# Patient Record
Sex: MALE | Race: WHITE | NOT HISPANIC OR LATINO | Employment: FULL TIME | URBAN - METROPOLITAN AREA
[De-identification: names, ages, dates, MRNs, and addresses within clinical notes are randomized per-mention and may not be internally consistent; named-entity substitution may affect disease eponyms.]

---

## 2021-02-27 ENCOUNTER — VIRTUAL VISIT (OUTPATIENT)
Dept: URGENT CARE | Facility: CLINIC | Age: 31
End: 2021-02-27
Payer: COMMERCIAL

## 2021-02-27 DIAGNOSIS — H10.32 ACUTE BACTERIAL CONJUNCTIVITIS OF LEFT EYE: Primary | ICD-10-CM

## 2021-02-27 PROCEDURE — 99203 OFFICE O/P NEW LOW 30 MIN: CPT | Mod: 95 | Performed by: NURSE PRACTITIONER

## 2021-02-27 RX ORDER — POLYMYXIN B SULFATE AND TRIMETHOPRIM 1; 10000 MG/ML; [USP'U]/ML
1-2 SOLUTION OPHTHALMIC EVERY 4 HOURS
Qty: 10 ML | Refills: 0 | Status: SHIPPED | OUTPATIENT
Start: 2021-02-27 | End: 2021-03-06

## 2021-02-27 NOTE — PROGRESS NOTES
"  The parent/guardian has been notified of following:     \"This telephone visit will be conducted via a call between you, your child and your child's physician/provider. We have found that certain health care needs can be provided without the need for a physical exam.  This service lets us provide the care you need with a short phone conversation.  If a prescription is necessary we can send it directly to your pharmacy.  If lab work is needed we can place an order for that and you can then stop by our lab to have the test done at a later time.    Telephone visits are billed at different rates depending on your insurance coverage. During this emergency period, for some insurers they may be billed the same as an in-person visit.  Please reach out to your insurance provider with any questions.    If during the course of the call the physician/provider feels a telephone visit is not appropriate, you will not be charged for this service.\"    Parent/guardian has given verbal consent for Telephone visit?  Yes    What phone number would you like to be contacted at? home    How would you like to obtain your AVS? [employer]      SUBJECTIVE:  Chief Complaint:   Chief Complaint   Patient presents with     Conjunctivitis     History of Present Illness:  Kamron Headley is a 30 year old male who presents complaining of moderate left eye discharge, mattering starting today.   Onset/timing: sudden.    Associated Signs and Symptoms: has acne and he feels this may have caused a \"bacterial\" infection in the eye  Treatment measures tried include: none  Contact wearer : No    Past Medical History:   Diagnosis Date     NO ACTIVE PROBLEMS      Current Outpatient Medications   Medication Sig Dispense Refill     acyclovir (ZOVIRAX) 800 MG tablet Take 1 tablet (800 mg) by mouth 5 times daily 35 tablet 0     bismuth subsalicylate (PEPTO BISMOL) 262 MG/15ML suspension Take 15 mLs by mouth every 6 hours as needed for indigestion       triamcinolone " (KENALOG) 0.1 % paste Apply to affected area twice per day for 2 weeks. 5 g 0     valACYclovir (VALTREX) 1000 mg tablet Take 1 tablet (1,000 mg) by mouth 3 times daily 21 tablet 0        ROS:  Review of systems negative except as stated above.    OBJECTIVE:  There were no vitals taken for this visit.  General: no acute distress  Reports yellow discharge    ASSESSMENT:  Bacterial Conjunctivitis    PLAN:  Warm packs for comfort. Hygiene measures discussed. Polytrim ophthalmic drops-1-2 drops in the affected eye(s) every 4 hours while awake for 3 days. Follow-up if no improvement noted.     Minutes 11  See orders in epic

## 2021-02-27 NOTE — PATIENT INSTRUCTIONS
Patient Education     What Is Conjunctivitis?    Conjunctivitis is an irritation or infection. It affects the membrane that covers the white of your eye and the inside of your eyelid (conjunctiva). It can happen to one or both eyes. The membrane swells and the blood vessels enlarge (dilate). This makes your eye red. That's why conjunctivitis is sometimes called red eye or pink eye.   What are the symptoms?  If you have one or more of these symptoms, see an eye healthcare provider:    Redness in and around your eye    Eyes that are puffy and sore    Itching, burning, or stinging eyes    Watery eyes or discharge from your eye    Eyelids that are crusty or stuck together when you wake up in the morning    Pink color in the whites of one or both eyes    Sensitivity to bright light  Getting treatment quickly can help prevent damage to your eyes.  How is it diagnosed?  Conjunctivitis is often a minor eye infection. But it can sometimes become a more serious problem. Some more serious eye diseases have symptoms that look like conjunctivitis. So it's important for an eye healthcare provider to diagnose you. Your eye healthcare provider will ask about your symptoms and any medicines you take. He or she will ask about any illnesses or health conditions you may have. The healthcare provider will also check your eyes with a hand-held light and a special microscope called a slit lamp.   [employer] last reviewed this educational content on 8/1/2020 2000-2020 [employer]. [address] [place] [address], [place] [address]. All rights reserved. This information is not intended as a substitute for professional medical care. Always follow your healthcare professional's instructions.

## 2021-04-18 ENCOUNTER — HEALTH MAINTENANCE LETTER (OUTPATIENT)
Age: 31
End: 2021-04-18

## 2021-07-06 ENCOUNTER — NURSE TRIAGE (OUTPATIENT)
Dept: NURSING | Facility: CLINIC | Age: 31
End: 2021-07-06

## 2021-07-06 NOTE — TELEPHONE ENCOUNTER
Pt reports intermittent, low-grade fever, and phantom smells.  He had epistaxis of right nare, 2 days ago.  He has a constant headache, which at worst, he rates at 9/10.  He has pain above his right eye, under his right eye, and at the teeth/upper right jaw and ear.  He is also having night sweats.  Pt has tried Sudafed without relief.  Ibuprofen helps for a short while.    Per protocol, pt advised to be seen today.  Pt transferred to scheduling to set up thisd appointment.  Oma Mcknight RN 07/06/21 10:13 AM  [facility] Nurse Advisor    Additional Information    Negative: Sounds like a life-threatening emergency to the triager    Negative: Difficulty breathing, and not from stuffy nose (e.g., not relieved by cleaning out the nose)    Negative: SEVERE headache and has fever    Negative: Patient sounds very sick or weak to the triager    SEVERE sinus pain    Protocols used: SINUS PAIN AND CONGESTION-A-OH    COVID 19 Nurse Triage Plan/Patient Instructions    Please be aware that novel coronavirus (COVID-19) may be circulating in the community. If you develop symptoms such as fever, cough, or SOB or if you have concerns about the presence of another infection including coronavirus (COVID-19), please contact your health care provider or visit [email or web address].     Disposition/Instructions    In-Person Visit with provider recommended. Reference Visit Selection Guide.    Thank you for taking steps to prevent the spread of this virus.  o Limit your contact with others.  o Wear a simple mask to cover your cough.  o Wash your hands well and often.    Resources    [facility]: About COVID-19: [email or web address]    CDC: What to Do If You're Sick: www.cdc.gov/coronavirus/2019-ncov/about/steps-when-sick.html    CDC: Ending Home Isolation: www.cdc.gov/coronavirus/2019-ncov/hcp/disposition-in-home-patients.html     CDC: Caring for Someone:  www.cdc.gov/coronavirus/2019-ncov/if-you-are-sick/care-for-someone.html     [facility]: Interim Guidance for Hospital Discharge to Home: www.health.[facility].[place]/diseases/coronavirus/hcp/hospdischarge.pdf    [facility] clinical trials (COVID-19 research studies): [email or web address]-clinical-trials     Below are the COVID-19 hotlines at the [place] Department of Health ([facility]). Interpreters are available.   o For health questions: Call 055-975-1777 or 1-831.648.9743 (7 a.m. to 7 p.m.)  o For questions about schools and childcare: Call 025-228-8229 or 1-552.523.1996 (7 a.m. to 7 p.m.)

## 2021-07-07 ENCOUNTER — TELEPHONE (OUTPATIENT)
Dept: FAMILY MEDICINE | Facility: CLINIC | Age: 31
End: 2021-07-07

## 2021-07-07 ENCOUNTER — OFFICE VISIT (OUTPATIENT)
Dept: FAMILY MEDICINE | Facility: CLINIC | Age: 31
End: 2021-07-07
Payer: COMMERCIAL

## 2021-07-07 VITALS
WEIGHT: 175 LBS | HEART RATE: 92 BPM | DIASTOLIC BLOOD PRESSURE: 78 MMHG | TEMPERATURE: 100.4 F | RESPIRATION RATE: 16 BRPM | SYSTOLIC BLOOD PRESSURE: 138 MMHG | OXYGEN SATURATION: 98 % | BODY MASS INDEX: 24.41 KG/M2

## 2021-07-07 DIAGNOSIS — S02.5XXS OPEN FRACTURE OF TOOTH, SEQUELA: ICD-10-CM

## 2021-07-07 DIAGNOSIS — J01.00 ACUTE NON-RECURRENT MAXILLARY SINUSITIS: Primary | ICD-10-CM

## 2021-07-07 PROCEDURE — 99214 OFFICE O/P EST MOD 30 MIN: CPT | Performed by: FAMILY MEDICINE

## 2021-07-07 RX ORDER — AMOXICILLIN AND CLAVULANATE POTASSIUM 562.5; 437.5; 62.5 MG/1; MG/1; MG/1
2 TABLET, MULTILAYER, EXTENDED RELEASE ORAL 2 TIMES DAILY
Qty: 28 TABLET | Refills: 0 | Status: SHIPPED | OUTPATIENT
Start: 2021-07-07 | End: 2021-07-14

## 2021-07-07 NOTE — TELEPHONE ENCOUNTER
Writer called patient and reviewed amoxicillin-clavulanate (AUGMENTIN) 875-125 MG tablet ordered by Dr. Becerra and to which pharmacy medication sent.    Patient verbalized understanding and in agreement with plan.  LEEANN Velásquez, RN  [facility]

## 2021-07-07 NOTE — PATIENT INSTRUCTIONS
Patient Education     Sinusitis (Antibiotic Treatment)    The sinuses are air-filled spaces within the bones of the face. They connect to the inside of the nose. Sinusitis is an inflammation of the tissue that lines the sinuses. Sinusitis can occur during a cold. It can also happen due to allergies to pollens and other particles in the air. Sinusitis can cause symptoms of sinus congestion and a feeling of fullness. A sinus infection causes fever, headache, and facial pain. There is often green or yellow fluid draining from the nose or into the back of the throat (post-nasal drip). You have been given antibiotics to treat this condition.   Home care    Take the full course of antibiotics as instructed. Don't stop taking them, even when you feel better.    Drink plenty of water, hot tea, and other liquids as directed by the healthcare provider. This may help thin nasal mucus. It also may help your sinuses drain fluids.    Heat may help soothe painful areas of your face. Use a towel soaked in hot water. Or,  the shower and direct the warm spray onto your face. Using a vaporizer along with a menthol rub at night may also help soothe symptoms.     An expectorant with guaifenesin may help thin nasal mucus and help your sinuses drain fluids. Talk with your provider or pharmacists before taking an over-the-counter (OTC) medicine if you have any questions about it or its side effects..    You can use an OTC decongestant, unless a similar medicine was prescribed to you. Nasal sprays work the fastest. Use one that contains phenylephrine or oxymetazoline. First blow your nose gently. Then use the spray. Don't use these medicines more often than directed on the label. If you do, your symptoms may get worse. You may also take pills that contain pseudoephedrine. Don t use products that combine multiple medicines. This is because side effects may be increased. Read labels. You can also ask the pharmacist for help. (People  with high blood pressure should not use decongestants. They can raise blood pressure.) Talk with your provider or pharmacist if you have any questions about the medicine..    OTC antihistamines may help if allergies contributed to your sinusitis. Talk with your provider or pharmacist if you have any questions about the medicine..    Don't use nasal rinses or irrigation during an acute sinus infection, unless your healthcare provider tells you to. Rinsing may spread the infection to other areas in your sinuses.    Use acetaminophen or ibuprofen to control pain, unless another pain medicine was prescribed to you. If you have chronic liver or kidney disease or ever had a stomach ulcer, talk with your healthcare provider before using these medicines. Never give aspirin to anyone under age 18 who is ill with a fever. It may cause severe liver damage.    Don't smoke. This can make symptoms worse.    Follow-up care  Follow up with your healthcare provider, or as advised.   When to seek medical advice  Call your healthcare provider if any of these occur:     Facial pain or headache that gets worse    Stiff neck    Unusual drowsiness or confusion    Swelling of your forehead or eyelids    Symptoms don't go away in 10 days    Vision problems, such as blurred or double vision    Fever of 100.4 F (38 C) or higher, or as directed by your healthcare provider  Call 911  Call 911 if any of these occur:     Seizure    Trouble breathing    Feeling dizzy or faint    Fingernails, skin or lips look blue, purple , or gray  Prevention  Here are steps you can take to help prevent an infection:     Keep good hand washing habits.    Don t have close contact with people who have sore throats, colds, or other upper respiratory infections.    Don t smoke, and stay away from secondhand smoke.    Stay up to date with of your vaccines.  [employer] last reviewed this educational content on 12/1/2019 2000-2021 [employer]. All rights  reserved. This information is not intended as a substitute for professional medical care. Always follow your healthcare professional's instructions.         PLEASE MAKE A FOLLOW-UP APPOINTMENT TO SEE A DENTIST FOR YOUR TOOTH.

## 2021-07-07 NOTE — PROGRESS NOTES
Assessment & Plan     Acute non-recurrent maxillary sinusitis  -Given subjective fever and chills will treat for acute bacterial sinusitis  -High dose amoxicillin/augmentin not covered. Sent in for augmenting BID x 7 days  -If worsening symptoms, facial swelling/erthema advised to go to ED immediately for evaluation    Fractured tooth  -Noted concern for potential dental abscess if continues to go untreated  -Discussed seeing dentist for this issue  -Does not appear to be infected, gums are not inflamed      30 minutes spent on the date of the encounter doing chart review, history and exam, documentation and further activities per the note       DO RENEA Jacobson [facility]    Nabila Lundy is a 31 year old who presents for the following health issues:    HPI     Patient here for right sided maxillary sinus pain and nasal congestion. Symptoms started about 5-6 days ago after he had some bleeding from his right nostril which has since resolved. Now having headaches and pain over his right cheek. Yellow-green nasal discharge. States subjective fevers and chills at home. Notes pain radiates to is upper right jaw and ear. States history of chipped upper right molar, has not gone in to see a dentist. Tried sudafed without relief. Using ibuprofen with some pain relief. Denies cough, wheezing, nausea, vomiting, changes in vision, blurry vision.     Review of Systems   INTEGUMENTARY/SKIN: NEGATIVE for worrisome rashes, moles or lesions  EYES: NEGATIVE for vision changes or irritation  RESP: NEGATIVE for significant cough or SOB  CV: NEGATIVE for chest pain, palpitations or peripheral edema  GI: NEGATIVE for nausea, abdominal pain, heartburn, or change in bowel habits  NEURO: NEGATIVE for weakness, dizziness or paresthesias      Objective    /78 (BP Location: Right arm, Patient Position: Sitting, Cuff Size: Adult Regular)   Pulse 92   Temp 100.4  F (38  C) (Tympanic)   Resp 16    Wt 79.4 kg (175 lb)   SpO2 98%   BMI 24.41 kg/m    Body mass index is 24.41 kg/m .  Physical Exam   GENERAL: alert and no distress  EYES: Eyes grossly normal to inspection, PERRL and conjunctivae and sclerae normal. No periorbital edema.   HENT: tenderness over right maxillary sinus. Ear canals and TM's normal, nose and mouth without ulcers or lesions. Chipped right upper molar. No obvious gum swelling or erythema.   NECK: no adenopathy, no asymmetry, masses, or scars and thyroid normal to palpation  RESP: lungs clear to auscultation - no rales, rhonchi or wheezes  CV: regular rate and rhythm, normal S1 S2, no S3 or S4, no murmur, click or rub, no peripheral edema and peripheral pulses strong  SKIN: no suspicious lesions or rashes

## 2021-07-07 NOTE — TELEPHONE ENCOUNTER
"Pt saw Dr. Becerra today.  His pharmacy will only fill 3 days of the antibiotic.  They said the dose was \"weird\".  High cost, too.    PT says he sees generic augmentin 875-125 mg on his formulary.  Please advise. Send in a new rx?    OK to use the [facility] on  or [facility] pharmacy at the clinic. Whatever is faster.        OK TO LEAVE A DETAILED MESSAGE.  NISSA Cramer    "

## 2021-07-08 ENCOUNTER — TELEPHONE (OUTPATIENT)
Dept: FAMILY MEDICINE | Facility: CLINIC | Age: 31
End: 2021-07-08

## 2021-07-08 NOTE — TELEPHONE ENCOUNTER
This Rx for amoxicillin-clavulanate (AUGMENTIN XR) 1000-62.5 MG 12 hr tablet cannot be ordered.  Cost is $169.01.  Please put in a new Rx for an alternative.  Suggested alternative/s is/are: switch to a cheaper med.

## 2021-07-08 NOTE — TELEPHONE ENCOUNTER
"This was resolved yesterday.     Pt saw Dr. Becerra today.  His pharmacy will only fill 3 days of the antibiotic.  They said the dose was \"weird\".  High cost, too.     PT says he sees generic augmentin 875-125 mg on his formulary.  Please advise. Send in a new rx?     OK to use the [facility] on  or [facility] pharmacy at the clinic. Whatever is faster.        "

## 2021-10-02 ENCOUNTER — HEALTH MAINTENANCE LETTER (OUTPATIENT)
Age: 31
End: 2021-10-02

## 2022-05-14 ENCOUNTER — HEALTH MAINTENANCE LETTER (OUTPATIENT)
Age: 32
End: 2022-05-14

## 2022-09-03 ENCOUNTER — HEALTH MAINTENANCE LETTER (OUTPATIENT)
Age: 32
End: 2022-09-03

## 2023-02-16 ENCOUNTER — OFFICE VISIT (OUTPATIENT)
Dept: FAMILY MEDICINE | Facility: CLINIC | Age: 33
End: 2023-02-16
Payer: COMMERCIAL

## 2023-02-16 VITALS
BODY MASS INDEX: 23.24 KG/M2 | TEMPERATURE: 99.3 F | DIASTOLIC BLOOD PRESSURE: 68 MMHG | SYSTOLIC BLOOD PRESSURE: 112 MMHG | WEIGHT: 166 LBS | HEIGHT: 71 IN | HEART RATE: 86 BPM | RESPIRATION RATE: 16 BRPM | OXYGEN SATURATION: 98 %

## 2023-02-16 DIAGNOSIS — A63.0 ANAL WARTS: ICD-10-CM

## 2023-02-16 DIAGNOSIS — R79.89 ELEVATED LFTS: ICD-10-CM

## 2023-02-16 DIAGNOSIS — F60.9 PERSONALITY DISORDER (H): ICD-10-CM

## 2023-02-16 DIAGNOSIS — K12.0 ORAL APHTHOUS ULCER: ICD-10-CM

## 2023-02-16 DIAGNOSIS — B97.7 HIGH RISK HPV INFECTION: ICD-10-CM

## 2023-02-16 DIAGNOSIS — Z21 HIV-1 (HUMAN IMMUNODEFICIENCY VIRUS I) (H): Primary | ICD-10-CM

## 2023-02-16 PROBLEM — F60.3 BORDERLINE PERSONALITY DISORDER (H): Status: ACTIVE | Noted: 2023-02-16

## 2023-02-16 LAB
ALBUMIN SERPL BCG-MCNC: 4.7 G/DL (ref 3.5–5.2)
ALP SERPL-CCNC: 152 U/L (ref 40–129)
ALT SERPL W P-5'-P-CCNC: 107 U/L (ref 10–50)
ANION GAP SERPL CALCULATED.3IONS-SCNC: 15 MMOL/L (ref 7–15)
AST SERPL W P-5'-P-CCNC: 61 U/L (ref 10–50)
BASOPHILS # BLD AUTO: 0 10E3/UL (ref 0–0.2)
BASOPHILS NFR BLD AUTO: 0 %
BILIRUB SERPL-MCNC: 0.6 MG/DL
BUN SERPL-MCNC: 8.5 MG/DL (ref 6–20)
C TRACH DNA SPEC QL NAA+PROBE: NEGATIVE
CALCIUM SERPL-MCNC: 10.1 MG/DL (ref 8.6–10)
CD3 CELLS # BLD: 365 CELLS/UL (ref 603–2990)
CD3 CELLS NFR BLD: 53 % (ref 49–84)
CD3+CD4+ CELLS # BLD: 112 CELLS/UL (ref 441–2156)
CD3+CD4+ CELLS NFR BLD: 16 % (ref 28–63)
CD3+CD4+ CELLS/CD3+CD8+ CLL BLD: 0.51 % (ref 1.4–2.6)
CD3+CD8+ CELLS # BLD: 221 CELLS/UL (ref 125–1312)
CD3+CD8+ CELLS NFR BLD: 32 % (ref 10–40)
CHLORIDE SERPL-SCNC: 100 MMOL/L (ref 98–107)
CREAT SERPL-MCNC: 0.58 MG/DL (ref 0.67–1.17)
DEPRECATED HCO3 PLAS-SCNC: 24 MMOL/L (ref 22–29)
EOSINOPHIL # BLD AUTO: 0 10E3/UL (ref 0–0.7)
EOSINOPHIL NFR BLD AUTO: 1 %
ERYTHROCYTE [DISTWIDTH] IN BLOOD BY AUTOMATED COUNT: 12.8 % (ref 10–15)
GFR SERPL CREATININE-BSD FRML MDRD: >90 ML/MIN/1.73M2
GLUCOSE SERPL-MCNC: 85 MG/DL (ref 70–99)
HCT VFR BLD AUTO: 42.1 % (ref 40–53)
HGB BLD-MCNC: 14.5 G/DL (ref 13.3–17.7)
IMM GRANULOCYTES # BLD: 0 10E3/UL
IMM GRANULOCYTES NFR BLD: 0 %
LYMPHOCYTES # BLD AUTO: 0.6 10E3/UL (ref 0.8–5.3)
LYMPHOCYTES NFR BLD AUTO: 18 %
MCH RBC QN AUTO: 28.6 PG (ref 26.5–33)
MCHC RBC AUTO-ENTMCNC: 34.4 G/DL (ref 31.5–36.5)
MCV RBC AUTO: 83 FL (ref 78–100)
MONOCYTES # BLD AUTO: 0.2 10E3/UL (ref 0–1.3)
MONOCYTES NFR BLD AUTO: 7 %
N GONORRHOEA DNA SPEC QL NAA+PROBE: NEGATIVE
NEUTROPHILS # BLD AUTO: 2.5 10E3/UL (ref 1.6–8.3)
NEUTROPHILS NFR BLD AUTO: 74 %
PLATELET # BLD AUTO: 202 10E3/UL (ref 150–450)
POTASSIUM SERPL-SCNC: 3.8 MMOL/L (ref 3.4–5.3)
PROT SERPL-MCNC: 8.7 G/DL (ref 6.4–8.3)
RBC # BLD AUTO: 5.07 10E6/UL (ref 4.4–5.9)
SODIUM SERPL-SCNC: 139 MMOL/L (ref 136–145)
T CELL COMMENT: ABNORMAL
T PALLIDUM AB SER QL: NONREACTIVE
WBC # BLD AUTO: 3.3 10E3/UL (ref 4–11)

## 2023-02-16 PROCEDURE — 87591 N.GONORRHOEAE DNA AMP PROB: CPT | Performed by: FAMILY MEDICINE

## 2023-02-16 PROCEDURE — 86360 T CELL ABSOLUTE COUNT/RATIO: CPT | Performed by: FAMILY MEDICINE

## 2023-02-16 PROCEDURE — 87491 CHLMYD TRACH DNA AMP PROBE: CPT | Performed by: FAMILY MEDICINE

## 2023-02-16 PROCEDURE — 86696 HERPES SIMPLEX TYPE 2 TEST: CPT | Performed by: FAMILY MEDICINE

## 2023-02-16 PROCEDURE — 86359 T CELLS TOTAL COUNT: CPT | Performed by: FAMILY MEDICINE

## 2023-02-16 PROCEDURE — 87522 HEPATITIS C REVRS TRNSCRPJ: CPT | Performed by: FAMILY MEDICINE

## 2023-02-16 PROCEDURE — 90651 9VHPV VACCINE 2/3 DOSE IM: CPT | Performed by: FAMILY MEDICINE

## 2023-02-16 PROCEDURE — 36415 COLL VENOUS BLD VENIPUNCTURE: CPT | Performed by: FAMILY MEDICINE

## 2023-02-16 PROCEDURE — 86778 TOXOPLASMA ANTIBODY IGM: CPT | Mod: 90 | Performed by: FAMILY MEDICINE

## 2023-02-16 PROCEDURE — 86780 TREPONEMA PALLIDUM: CPT | Performed by: FAMILY MEDICINE

## 2023-02-16 PROCEDURE — 99214 OFFICE O/P EST MOD 30 MIN: CPT | Mod: 25 | Performed by: FAMILY MEDICINE

## 2023-02-16 PROCEDURE — 86777 TOXOPLASMA ANTIBODY: CPT | Mod: 90 | Performed by: FAMILY MEDICINE

## 2023-02-16 PROCEDURE — 99000 SPECIMEN HANDLING OFFICE-LAB: CPT | Performed by: FAMILY MEDICINE

## 2023-02-16 PROCEDURE — 86701 HIV-1ANTIBODY: CPT | Mod: 59 | Performed by: FAMILY MEDICINE

## 2023-02-16 PROCEDURE — 86702 HIV-2 ANTIBODY: CPT | Mod: 59 | Performed by: FAMILY MEDICINE

## 2023-02-16 PROCEDURE — 85025 COMPLETE CBC W/AUTO DIFF WBC: CPT | Performed by: FAMILY MEDICINE

## 2023-02-16 PROCEDURE — 90471 IMMUNIZATION ADMIN: CPT | Performed by: FAMILY MEDICINE

## 2023-02-16 PROCEDURE — 86695 HERPES SIMPLEX TYPE 1 TEST: CPT | Performed by: FAMILY MEDICINE

## 2023-02-16 PROCEDURE — 86803 HEPATITIS C AB TEST: CPT | Performed by: FAMILY MEDICINE

## 2023-02-16 PROCEDURE — 80053 COMPREHEN METABOLIC PANEL: CPT | Performed by: FAMILY MEDICINE

## 2023-02-16 PROCEDURE — 87536 HIV-1 QUANT&REVRSE TRNSCRPJ: CPT | Performed by: FAMILY MEDICINE

## 2023-02-16 ASSESSMENT — PAIN SCALES - GENERAL: PAINLEVEL: NO PAIN (0)

## 2023-02-16 NOTE — TELEPHONE ENCOUNTER
Diagnosis, Referred by & from: Anal Warts   Appt date: 5/1/2023   NOTES STATUS DETAILS   OFFICE NOTE from referring provider [facility]:  2/16/23 - PCC OV with Dr. Geller   OFFICE NOTE from other specialist Internal [facility]:  3/31/23 - ID OV with Dr. [facility]:  9/3/13 - PCC OV with Tess Ferreira NP    [facility]:  5/8/13 - CR OV with Dr. Lopez   DISCHARGE SUMMARY from hospital N/A    DISCHARGE REPORT from the ER N/A    OPERATIVE REPORT Internal [facility]:  5/30/13 - OP Note for EXCISION AND FULGURATION OF ANAL CONDYLOMA with Dr. Lopez   MEDICATION LIST Internal    LABS     BIOPSIES/PATHOLOGY RELATED TO DIAGNOSIS Internal [facility]:  5/30/13 - Anal Biopsy (Case: [id number])   DIAGNOSTIC PROCEDURES N/A    IMAGING (DISC & REPORT) N/A

## 2023-02-16 NOTE — PROGRESS NOTES
Assessment & Plan     (B20) HIV-1 (human immunodeficiency virus I) (H)  (primary encounter diagnosis)  Comment:   Note that patient reports this is first he knew of positive result and he is understandably quite upset. He would like additional testing before receiving treatment or seeing specialist because he believes if he has been positive for the past almost 7 years, that he'd be dead by now. He is aware that HIV is treatable.  He declined ID referral, worried about cost.    I reviewed chart, patient had been contacted with [facility], [email or web address] message (which he had reviewed), certified letter and telephone in which diagnosis and ID referral was discussed with him between 5/25/2016 and 7/26/2016.  I verified that letters sent at that time were sent to the correct address with him today. He denies any knowledge of telephone calls and denies any communication or letters received.     Will obtain labs as below and will follow up as indicated.    Plan: HIV Antigen Antibody Combo, HIV-1 RNA         quantitative, CBC with platelets and         differential, Comprehensive metabolic panel         (BMP + Alb, Alk Phos, ALT, AST, Total. Bili,         TP), Treponema Abs w Reflex to RPR and Titer,         NEISSERIA GONORRHOEA PCR, CHLAMYDIA TRACHOMATIS        PCR, T cell        subset profile            (A63.0) Anal warts  Comment: Patient feels that last surgery surgeon did not remove all lesions, intentionally left some. Operative note reports removal of all lesions and fulguration of suspect areas.  Significant warty growths noted today, I do recommend surgical removal, referred as below.  I do recommend  HPV vaccine series, he agreed, #1 given today. Follow up 2 months for #2 of 3.  Plan: Adult Colorectal Surgery  Referral          (K12.0) Oral aphthous ulcer  Comment: could be herpes, not likely to be chancroid, stress related, vs infectious, HIV workup pending, see above, test  Plan: over the counter treatments as  "needed for pain relief    Personality disorder strongly suspected; patient not content with explanations, requesting my help, refusing help offered. See below.    When I called patient he asked if he was being charged 100's of dollars to not get resolution for the problem he came in for (the apthous ulcer), \"I'm seriously asking if you're going to not give me something for something that's lasted 4 weeks. What I'm trying to understand is why you're not prescribing something for my lip. You're not willing to do anything to help.\"    He insists that he was never given the diagnosis of HIV in the past, he had no conversations, he never received any mail, and that he hadn't checked his mychart prior to today, not consistent with chart documentation. When  I recommend ID referral he reported being worried about cost. I offered to prescribe initial antiviral medications but that he'd need to be followed by ID, he declined. I reiterated that HIV was treatable and recommended treatment, he told he thinks I probably just want him to die. I assured him this wasn't true.  I did hang up when he started getting louder and verbally abusive.     No follow-ups on file.    Tess Geller MD  [facility]    Nabila Lundy is a 32 year old, presenting for the following health issues:  Office Visit (Patient complain of sore in mouth and is not healing for the past 4 weeks and was dx with HPV about 8 years ago. Thinking that this might be the cause of the sore. Really want referral for this matters. )      History of Present Illness       Reason for visit:  Sexual health    He eats 2-3 servings of fruits and vegetables daily.He consumes 0 sweetened beverage(s) daily.He exercises with enough effort to increase his heart rate 20 to 29 minutes per day.  He exercises with enough effort to increase his heart rate 4 days per week.   He is taking medications regularly.     1. Oral lesion  Patient " "describes large, painful sore of left side inner lip, onset one month ago, not responding to over the counter treatments including oragel. Denies dental caries, oral trauma or any other triggering event.    2. HPV condyloma  He described 2 new lesions on his penis and large anal warts that are bothering him. He feels he might have an internal mass as well, not sure if this is a wart or hemorrhoid. He denies blood in his stool, pain with defecation, or stool changes. He has not had the HPV vaccine (Gardisil).    3. Positive HIV test 5/25/2016  See chart, patient was referred to ID but was having insurance coverage issues, has not seen specialists, and has not had follow up lab tests or treatment. He denies recurrent infections, fatigue, weight loss or other signs of illness except as noted above. See A/P below.    Review of Systems   Constitutional, HEENT, cardiovascular, pulmonary, GI, , musculoskeletal, neuro, skin, endocrine and psych systems are negative, except as otherwise noted.      Objective    /68 (BP Location: Left arm, Patient Position: Sitting, Cuff Size: Adult Regular)   Pulse 86   Temp 99.3  F (37.4  C) (Temporal)   Resp 16   Ht 1.803 m (5' 11\")   Wt 75.3 kg (166 lb)   SpO2 98%   BMI 23.15 kg/m    Body mass index is 23.15 kg/m .  Physical Exam   GENERAL: healthy, alert and no distress  NECK: no adenopathy, no asymmetry, masses, or scars and thyroid normal to palpation  RESP: lungs clear to auscultation - no rales, rhonchi or wheezes  CV: regular rate and rhythm, normal S1 S2, no S3 or S4, no murmur, click or rub, no peripheral edema and peripheral pulses strong  ABDOMEN: soft, nontender, no hepatosplenomegaly, no masses and bowel sounds normal   (male): testicles normal without atrophy or masses, no hernias and penile warts noted, #2 typically verucous small pedunculated massed noted of anterior penis.  Rectum: multiple fleshy warty growths noted of perianal area. I performed rectal " exam with his permission, warty growth noted by palpation of inner anal area, perhaps emiting from anal verge. No rectal bleeding noted. No tenderness noted on palpation.  MS: no gross musculoskeletal defects noted, no edema  NEURO: Normal strength and tone, mentation intact and speech normal  PSYCH: anxious and note discrepancy between chart documentation and patient's history in A/P.    Results for orders placed or performed in visit on 02/16/23 (from the past 24 hour(s))   CBC with platelets and differential    Narrative    The following orders were created for panel order CBC with platelets and differential.  Procedure                               Abnormality         Status                     ---------                               -----------         ------                     CBC with platelets and d...[[id number]]  Abnormal            Final result                 Please view results for these tests on the individual orders.   Comprehensive metabolic panel (BMP + Alb, Alk Phos, ALT, AST, Total. Bili, TP)   Result Value Ref Range    Sodium 139 136 - 145 mmol/L    Potassium 3.8 3.4 - 5.3 mmol/L    Chloride 100 98 - 107 mmol/L    Carbon Dioxide (CO2) 24 22 - 29 mmol/L    Anion Gap 15 7 - 15 mmol/L    Urea Nitrogen 8.5 6.0 - 20.0 mg/dL    Creatinine 0.58 (L) 0.67 - 1.17 mg/dL    Calcium 10.1 (H) 8.6 - 10.0 mg/dL    Glucose 85 70 - 99 mg/dL    Alkaline Phosphatase 152 (H) 40 - 129 U/L    AST 61 (H) 10 - 50 U/L     (H) 10 - 50 U/L    Protein Total 8.7 (H) 6.4 - 8.3 g/dL    Albumin 4.7 3.5 - 5.2 g/dL    Bilirubin Total 0.6 <=1.2 mg/dL    GFR Estimate >90 >60 mL/min/1.73m2   Treponema Abs w Reflex to RPR and Titer   Result Value Ref Range    Treponema Antibody Total Nonreactive Nonreactive   CBC with platelets and differential   Result Value Ref Range    WBC Count 3.3 (L) 4.0 - 11.0 10e3/uL    RBC Count 5.07 4.40 - 5.90 10e6/uL    Hemoglobin 14.5 13.3 - 17.7 g/dL    Hematocrit 42.1 40.0 - 53.0 %    MCV 83 78 -  100 fL    MCH 28.6 26.5 - 33.0 pg    MCHC 34.4 31.5 - 36.5 g/dL    RDW 12.8 10.0 - 15.0 %    Platelet Count 202 150 - 450 10e3/uL    % Neutrophils 74 %    % Lymphocytes 18 %    % Monocytes 7 %    % Eosinophils 1 %    % Basophils 0 %    % Immature Granulocytes 0 %    Absolute Neutrophils 2.5 1.6 - 8.3 10e3/uL    Absolute Lymphocytes 0.6 (L) 0.8 - 5.3 10e3/uL    Absolute Monocytes 0.2 0.0 - 1.3 10e3/uL    Absolute Eosinophils 0.0 0.0 - 0.7 10e3/uL    Absolute Basophils 0.0 0.0 - 0.2 10e3/uL    Absolute Immature Granulocytes 0.0 <=0.4 10e3/uL   T cell subset profile   Result Value Ref Range    CD3% Total T Cells 53 49 - 84 %    Absolute CD3, Total T Cells 365 (L) 603 - 2,990 cells/uL    CD4% [place] T Cells 16 (L) 28 - 63 %    Absolute CD4, [place] T Cells 112 (L) 441 - 2,156 cells/uL    CD8% Suppressor T Cells 32 10 - 40 %    Absolute CD8, Suppressor T Cells 221 125 - 1,312 cells/uL    CD4:CD8 Ratio 0.51 (L) 1.40 - 2.60    T Cell Comment

## 2023-02-16 NOTE — PATIENT INSTRUCTIONS
Please check that you've had tdap with in the past 10 years, and meningitis vaccine as well (usually age 16 or so)

## 2023-02-17 ENCOUNTER — TELEPHONE (OUTPATIENT)
Dept: FAMILY MEDICINE | Facility: CLINIC | Age: 33
End: 2023-02-17
Payer: COMMERCIAL

## 2023-02-17 DIAGNOSIS — K12.0 ORAL APHTHOUS ULCER: ICD-10-CM

## 2023-02-17 DIAGNOSIS — B18.2 CHRONIC HEPATITIS C WITHOUT HEPATIC COMA (H): ICD-10-CM

## 2023-02-17 DIAGNOSIS — Z21 HIV-1 (HUMAN IMMUNODEFICIENCY VIRUS I) (H): Primary | ICD-10-CM

## 2023-02-17 DIAGNOSIS — B20 AIDS (ACQUIRED IMMUNODEFICIENCY SYNDROME), CD4 <=200 (H): ICD-10-CM

## 2023-02-17 LAB
HIV 1+2 AB+HIV1 P24 AG SERPL QL IA: REACTIVE
HIV 1+2 AB+HIV1P24 AG SERPLBLD IA.RAPID: ABNORMAL
HIV 2 AB SERPLBLD QL IA.RAPID: NONREACTIVE
HIV1 AB SERPLBLD QL IA.RAPID: REACTIVE
HIV1 RNA # PLAS NAA DL=20: ABNORMAL COPIES/ML
HIV1 RNA SERPL NAA+PROBE-LOG#: 4.4 {LOG_COPIES}/ML
HSV1 IGG SERPL QL IA: 0.1 INDEX
HSV1 IGG SERPL QL IA: NORMAL
HSV2 IGG SERPL QL IA: 0.12 INDEX
HSV2 IGG SERPL QL IA: NORMAL

## 2023-02-17 NOTE — RESULT ENCOUNTER NOTE
Thank you for getting labs done.   You do not have chlamydia, gonorrhea or syphilis, which is good news    The testing confirms that you have HIV type 1, and because your CD-4 count (a measure of a type of white blood cell) is less than 200, you do have active AIDS. I do recommend that you start antiviral treatment as soon as you can to help prevent infectious complications.  I have put in a urgent referral to our infectious disease specialist, who will contact you to help schedule an appointment soon. Treatment reduces both your symptoms of HIV and reduces your risk of transmitting the infection to others.    You also have Hepatitis C, another blood borne infection. This is curable with specialized treatment.  I have referred you to a gastroenterology specialist who can recommend treatments for you. This is also contagious through contact with infected blood.    Both HIV and Hepatitis C are reportable infections so the [facility] will probably be contacting you separately from our clinic because it's important to trace any contacts to offer them testing and treatment as well.     Please know you do have treatment options.    Why Should You Take Your HIV Medicine as Prescribed?  If taken as prescribed, HIV medicine can reduce the amount of HIV in your blood (also called your viral load) to a very low level. This is called viral suppression.    If your viral load is so low that a standard lab can't detect it, this is called having an undetectable viral load. People with HIV who take HIV medicine as prescribed and get and keep an undetectable viral load can live long and healthy lives and will not transmit HIV to their HIV-negative partners through sex.    HIV treatment involves taking highly effective medicines called antiretroviral therapy (ART) that work to control the virus. ART is recommended for everyone with HIV, and people with HIV should start ART as soon as possible after  diagnosis.    People on ART take a combination of HIV medicines called an HIV treatment regimen. There are two types of HIV treatment: pills and shots. Pills are recommended for people who are just starting HIV treatment. There are many FDA-approved single pill and combination medicines available.    People who have had an undetectable viral load (or have been virally suppressed) for at least three months may consider shots.  Why Is HIV Treatment Important?  If taken as prescribed, HIV medicine reduces the amount of HIV in your blood (also called your viral load) to a very low level, which keeps your immune system working and prevents illness. This is called viral suppression, defined as having less than 200 copies of HIV per milliliter of blood.    HIV medicine can also make your viral load so low that a standard lab test can't detect it. This is called having an undetectable level viral load. Almost everyone who takes HIV medicine as prescribed can achieve an undetectable viral load, usually within 6 months after starting treatment. Many will bring their viral load to an undetectable level quickly, but it could take more time for a small portion of people just starting HIV medicine.    There are important health benefits to getting the viral load as low as possible. People with HIV who know their status, take HIV medicine as prescribed, and get and keep an undetectable viral load can live long and healthy lives.    There is also a major prevention benefit. People with HIV who take HIV medicine as prescribed and get and keep an undetectable viral load will not transmit HIV to their HIV-negative partners through sex.    Please contact the office if you have any questions about any of this.    Sincerely,  Dr. Tess Geller MD  2/17/2023

## 2023-02-18 LAB
T GONDII IGG SER-ACNC: <3 IU/ML
T GONDII IGM SER-ACNC: <3 AU/ML

## 2023-02-18 NOTE — TELEPHONE ENCOUNTER
Who is Calling: patient    Update: Patient calling and would like provider to call him regarding appointment yesterday. Patient did not discuss nature of the call or what the questions were regarding. Please advise, patient requesting call back on 2/18, I did state that it is the weekend and the provider may not look at her notes.     Does caller want a call/response back: Yes     Could we send this information to you in [employer] or would you prefer to receive a phone call?:   Patient would prefer a phone call   Okay to leave a detailed message?: Yes at Cell number on file:    Telephone Information:   Mobile [phone]

## 2023-02-20 ENCOUNTER — TELEPHONE (OUTPATIENT)
Dept: INFECTIOUS DISEASES | Facility: CLINIC | Age: 33
End: 2023-02-20
Payer: COMMERCIAL

## 2023-02-20 LAB
HCV AB SERPL QL IA: NONREACTIVE
HCV RNA SERPL NAA+PROBE-ACNC: NOT DETECTED IU/ML
HSV1 IGG SERPL QL IA: 0.12 INDEX
HSV1 IGG SERPL QL IA: NORMAL
HSV2 IGG SERPL QL IA: 0.32 INDEX
HSV2 IGG SERPL QL IA: NORMAL

## 2023-02-20 NOTE — TELEPHONE ENCOUNTER
"Spoke with patient regarding referral that was placed. Patient appears very anxious. Discussed with patient options for appointments. Patient requests an appointment this Friday, scheduled for 8am with Dr. Turner- unable to come in before then to meet with a nurse. Patient reports that he was \"blindsided\" by his PCP calling to tell him his HIV result which led to him becoming very angry. He states it was documented that he has a personality disorder but he does not believe he does and would like to ensure that does not impact his care. Of note, patient had initial positive result in 2016 and its documented that he was aware of it then, however he reports this most recent lab confirmation was surprising and the first he was made aware. Will send mychart to patient with my direct number if he has any questions.  "

## 2023-02-20 NOTE — TELEPHONE ENCOUNTER
DIAGNOSIS: Chronic hepatitis C without hepatic coma    Appt Date: 03.29.2023   NOTES STATUS DETAILS   OFFICE NOTE from referring provider Internal 02.17.2023 Tess Geller MD   OFFICE NOTES from other specialists     DISCHARGE SUMMARY from hospital     MEDICATION LIST     LIVER BIOSPY (IF APPLICABLE)      PATHOLOGY REPORTS      IMAGING     ENDOSCOPY (IF AVAILABLE)     COLONOSCOPY (IF AVAILABLE)     ULTRASOUND LIVER     CT OF ABDOMEN     MRI OF LIVER     FIBROSCAN, US ELASTOGRAPHY, FIBROSIS SCAN, MR ELASTOGRAPHY     LABS     HEPATIC PANEL (LIVER PANEL)     BASIC METABOLIC PANEL     COMPLETE METABOLIC PANEL Internal 02.16.2023   COMPLETE BLOOD COUNT (CBC) Internal 02.16.2023   INTERNATIONAL NORMALIZED RATIO (INR)     HEPATITIS C ANTIBODY Internal 02.16.2023   HEPATITIS C VIRAL LOAD/PCR     HEPATITIS C GENOTYPE     HEPATITIS B SURFACE ANTIGEN     HEPATITIS B SURFACE ANTIBODY     HEPATITIS B DNA QUANT LEVEL     HEPATITIS B CORE ANTIBODY

## 2023-02-21 NOTE — TELEPHONE ENCOUNTER
RECORDS RECEIVED FROM: Internal   DATE RECEIVED: 2.24.23   NOTES (Gather within 2 years) STATUS DETAILS   OFFICE NOTE from referring provider   Internal 2.16.23  Duyen OTTO   OFFICE NOTE from other specialist     DISCHARGE SUMMARY from hospital     DISCHARGE REPORT from the ER     LABS (any labs) Internal    MEDICATION LIST Internal    IMAGING  (NEED IMAGES AND REPORTS)     Osteomyelitis: Foot imaging      Liver Abscess: Abdominal imagineg     Other (anything related to diagnoses

## 2023-02-21 NOTE — RESULT ENCOUNTER NOTE
HI, the rest of your labs are back.    You do not have herpes 1 or 2, toxoplasmosis, or hepatitis C.  This is a different result form your labs several years ago, so I think most likely the prior Hepatitis C was a false positive, because the confirmatory test done this time was negative, as was the quantitative level.    Please schedule an appointment if you'd like to discuss these results.    Sincerely,  Dr. Tess Geller MD  2/21/2023

## 2023-02-21 NOTE — TELEPHONE ENCOUNTER
Hi, please offer patient an appointment if he would like to discuss medical issues. I'm happy to review labs and answer questions but it does have to be a medical appointment. Virtual is fine.  Please note that he has history of sometimes getting angry with prior contact, see chart.    Thank you!    Sincerely,  Dr. Tess Geller MD  2/21/2023

## 2023-02-22 NOTE — PROGRESS NOTES
[facility]  General Infectious Disease/HIV Clinic Note: New Patient     Patient:  Kamron Headley, Date of birth 1990  Medical record number [id number]  Date of Visit:  02/24/2023     Assessment and Plan   1. HIV/AIDS, CD4 112/16%  2. L lip ulcer present for 5 weeks  3. Mild transaminitis  4. H/o HPV+ condyloma acuminatum (resected 2013)  5. Hepatitis B nonimmune (per 2016 labs)    Mr. Headley presents to ID clinic to establish care for a new HIV/AIDS diagnosis. We discussed at length that by his current CD4 <200, he is considered to have AIDS which essentially means he is immunocompromised. We also discussed starting therapy today (biktarvy) which he is amenable to. Given his CD4 is <200, we will also start bactrim for PJP ppx. We discussed at length the resources available to our HIV+ patients, and provided reassurance that we will be able to provide ART regardless of insurance status, and have multiple resources available to him.     Regarding current medical concerns, I am concerned about the lesion he has on his lip. It has been present for 5 weeks, and does not appear consistent with HSV (noted negative HSV Abs as well). Given his low CD4 the differential is broad, but I am most concerned about malignancy vs atypical infection. I placed an ENT referral for biopsy and cultures. He is also concerned about the recurrence of his anal warts. He has previously had treatment and is known to be HPV+. On exam today, the warts are large, but do not appear to be invasive. He is able to defecate without issue, and only occasionally has bleeding. He already has follow up scheduled with Colorectal Surgery, so we will await their opinion. If his symptoms should worsen in the next few months, however, I will request a more urgent evaluation.     Mr. Headley is due for multiple vaccines, but I would like to hold off on these today as his immune response to vaccination is likely to be less robust  than usual given low CD4. By the same logic, I would like to wait for a quant gold until he has a higher CD4 as the results right now could be inaccurate.     Plan:  1. New pt HIV labs to be drawn today, including resistance testing   2. GC/chlamydia oral and rectal swabs obtained today  3. Start biktarvy today  4. Start bactrim ppx today  5. ENT referral for biopsy of lip lesion   - In addition to pathology, please send for bacterial, fungal, and AFB cultures and smears.   6. Pt already has follow up with colorectal surgery re: rectal HPV+ condyloma acuminatum.   7. ID pharmacy consult for medication management  8. Follow up & labs (HIV VL, CD4) with me in 4 weeks  9. When CD4 > 200, Hep B series, shingrix, PCV20, Tdap, meningococcal vaccine, quant gold.     RTC: 1 month with me, 2 weeks with ID pharmacy        Madie Turner MD    Pager [phone]  Infectious Diseases         History of the Infectious Disease lllness:   Kamron Headley is a pleasant 32 year old gentleman with no significant past medical history who presents to ID clinic to discuss a new diagnosis of HIV/AIDS.     Mr. Headley denies prior knowledge or memory of this, but he was first diagnosed with HIV in 5/2016 when he presented to his PCP with oral ulcers. He was tested for multiple other STIs at the time that were negative. He did not follow up with ID at the time and has not been on medications previously. He had a shingles outbreak around that time. He has otherwise been well until recently when he saw his PCP for an oral ulcer. He was tested for multiple STIs, including HIV, and was positive. CD4 is 112 (16%). He has been referred to us for additional management. He notes he has never been on PrEP before. He has not recently been sexually active, and has no current concerns for an STI. He did not undergo rectal or oral screening for GC/chlamydia and is interested in obtaining that today. He is also very eager to start HIV medications, and  understands the need for lifelong adherence.     Mr. Headley has some mild sinus congestion without pressure or pain. He thinks he may have a deviated septum though he has not been evaluated for this. He has an interior lip lesion that has been present for around 5 weeks. The area was more painful when it first developed, but the pain has subsided somewhat. The size has remained stable since he first noted it. He also notes some swelling and face asymmetry associated with the lip lesion. He also notes that his anal warts have grown quite large. He is having some pain with defecation and occasionally notes blood when he wipes. He has also developed 2 small lesions on his penile shaft. He is feeling very anxious but coming to terms with his new HIV diagnosis. He otherwise feels well and denies headaches, vision changes, chest pain, shortness of breath, abdominal pain, constipation or diarrhea, joint pains, swelling, or rashes.     HIV:  -diagnosis: 2016  -previous ART regimens: none  -resistance: unknown, panel pending    Health care maintenance:   A. Vaccination/Serology status.   -Hepatitis A: Immune  -Hepatitis B: Nonimmune, nonexposed  -Strep pneumo: due (will wait until CD4 >200)  -TDaP: last 2012, due  -Zoster: due (will wait until CD4 >200)  -Meningococcal: due (previously had menomune)  -Influenza   -COVID: up to date  -Mpox: up to date    B. Routine Infectious Disease screening.   -Hepatitis C: negative (2023)   -Toxo IgG: negative (2023)  -CMV IGG: negative (2023)   -TB screen: due  -STD sceen: GC/CT: pending; RPR negative (2023);     C. Cardiovascular, renal and bone health.   -Cholesterol: getting today  -Blood pressure: 120/83  -Smoking status:   -Alcohol:  -Illicit drug use:  -Weight:   -Dexa:    D. Cancer screening (if applicable)  -Pap smear: +HPV condyloma, awaiting colorectal surgery evaluation      Review of Systems:  CONSTITUTIONAL:  No fevers or chills. No night sweats.  EYES: negative for icterus  or acute vision changes.   ENT:  negative for hearing loss, tinnitus or sore throat  RESPIRATORY:  negative for cough, sputum, dyspnea  CARDIOVASCULAR:  negative for chest pain, heart palpitations  GASTROINTESTINAL:  negative for nausea, vomiting, diarrhea or constipation  GENITOURINARY:  negative for dysuria or hematuria.  HEME:  No easy bruising or bleeding  INTEGUMENT:  negative for rash or pruritus  NEURO:  Negative for headache or tremor.    Past Surgical History:   Procedure Laterality Date     EXAM UNDER ANESTHESIA, FULGURATE CONDYLOMA ANUS, COMBINED  5/30/2013    Procedure: COMBINED EXAM UNDER ANESTHESIA, FULGURATE CONDYLOMA ANUS;  Exam Under Anesthesia of Anus, Excision and Fulguration of Anal Condyloma;  Surgeon: Matias Lopez MD;  Location: [facility] OR     NO HISTORY OF SURGERY         Family History   Problem Relation Age of Onset     Cancer Paternal Grandmother      Family History Negative No family hx of         no skin disorders       Social History     Social History Narrative     Not on file     Social History     Tobacco Use     Smoking status: Never     Smokeless tobacco: Never   Vaping Use     Vaping Use: Never used   Substance Use Topics     Alcohol use: No     Drug use: No       Immunization History   Administered Date(s) Administered     COVID-19 Vaccine 18+ (Moderna) 04/15/2021, 05/13/2021, 12/01/2021     COVID-19 Vaccine Bivalent Booster 18+ (Moderna) 12/17/2022     DTAP (<7y) 05/03/1995     HEPA 08/22/2008, 10/29/2012     HPV9 02/16/2023     HepB 09/10/1999, 11/01/1999, 05/01/2000     Hib (PRP-T) 03/22/1991, 05/17/1991, 09/16/1991     Influenza Vaccine >6 months (Alfuria,Fluzone) 12/17/2022     MMR 09/10/1999     Meningococcal (Menomune ) 08/22/2008     OPV, trivalent, live 05/03/1995     Smallpox/Mpox Vaccine Subcutaneous (JYNNEOS) 08/19/2022, 09/28/2022     TD (ADULT, 7+) 06/17/2002     TDAP Vaccine (Adacel) 10/29/2012       Patient Active Problem List   Diagnosis     CARDIOVASCULAR  SCREENING; LDL GOAL LESS THAN 160     Tinea corporis     Skin rash     Elevated LFTs     Anal skin tag     High risk HPV infection     Anal warts     HIV-1 (human immunodeficiency virus I) (H)     Oral aphthous ulcer     Borderline personality disorder (H)     Personality disorder (H)     Chronic hepatitis C without hepatic coma (H)     AIDS (acquired immunodeficiency syndrome), CD4 <=200 (H)     No Known Allergies         Physical Exam:   Vitals were reviewed.  All vitals stable  /83 (BP Location: Right arm, Patient Position: Sitting, Cuff Size: Adult Regular)   Pulse 89   Temp 98.4  F (36.9  C) (Oral)   Wt 77.6 kg (171 lb)   SpO2 94%   BMI 23.85 kg/m    Wt Readings from Last 4 Encounters:   02/16/23 75.3 kg (166 lb)   07/07/21 79.4 kg (175 lb)   07/26/16 64.9 kg (143 lb)   05/24/16 67.7 kg (149 lb 4 oz)     Exam:  GENERAL: well-developed, well-nourished, alert, oriented, in no acute distress.  HEAD: Head is normocephalic, atraumatic   EYES: Eyes have anicteric sclerae.    ENT: Oropharynx is moist without exudates or ulcers. L inner lip with ~1.5 cm superficial erosion, brown film covering, mild edema of lip surrounding, mildly TTP.   NECK: Supple. No cervical LAD.   LUNGS: Clear to auscultation b/l. Normal WOB on RA.   Back: No TTP throughout spine.   CARDIOVASCULAR: Regular rate and rhythm with no murmurs, gallops or rubs.  ABDOMEN: Normal bowel sounds, soft, nontender.  SKIN: No acute rashes.   : ~3cm rectal condyloma acuminatum, no hemorrhage or areas of ulceration. 2 small condyloma on R side of penile shaft.   NEUROLOGIC: Grossly nonfocal.         Laboratory Data:     Metabolic Studies    Recent Labs   Lab Test 02/16/23  1119      POTASSIUM 3.8   CHLORIDE 100   CO2 24   ANIONGAP 15   BUN 8.5   CR 0.58*   GFRESTIMATED >90   GLC 85   DENISHA 10.1*       Hepatic Studies    Recent Labs   Lab Test 02/16/23  1119   BILITOTAL 0.6   ALKPHOS 152*   PROTTOTAL 8.7*   ALBUMIN 4.7   AST 61*   *        Hematology Studies     Recent Labs   Lab Test 02/16/23  1119   WBC 3.3*   HGB 14.5   HCT 42.1        Microbiology:  Last Culture results with specimen source  No results found for: CULT Specimen Description   Date Value Ref Range Status   05/24/2016 Throat  Final   05/24/2016 Urine  Final   08/20/2014 Urine  Final   09/03/2013 Urine  Final   02/19/2013 Knee  Final        Virology:  Hepatitis B Testing     Recent Labs   Lab Test 05/24/16  0913   AUSAB 2.26   HEPBANG Nonreactive      Hepatitis C Antibody   Date Value Ref Range Status   02/16/2023 Nonreactive Nonreactive Final   05/24/2016 (A) NR Final    Reactive   A reactive result indicates one of the following 1) current HCV infection 2)   past HCV infection that has resolved or 3) false positivity. The CDC recommends   that a reactive result should be followed by Nucleic acid testing for HCV RNA.  If HCV RNA is detected, that indicates current HCV infection. If HCV RNA is not   detected, that indicates either past, resolved HCV infection, or false HCV   antibody positivity.   Assay performance characteristics have not been established for newborns,   infants, and children     08/20/2014 Negative NEG Final     Herpes Simplex Virus Type 1 IgG Antibody   Date Value Ref Range Status   02/16/2023 No HSV-1 IgG antibodies detected. No HSV-1 IgG antibodies detected Final   02/16/2023 No HSV-1 IgG antibodies detected. No HSV-1 IgG antibodies detected Final     Herpes Simplex Virus Type 2 IgG Antibody   Date Value Ref Range Status   02/16/2023 No HSV-2 IgG antibodies detected. No HSV-2 IgG antibodies detected Final   02/16/2023 No HSV-2 IgG antibodies detected. No HSV-2 IgG antibodies detected Final     Imaging:   No results found for this or any previous visit.

## 2023-02-24 ENCOUNTER — TELEPHONE (OUTPATIENT)
Dept: INFECTIOUS DISEASES | Facility: CLINIC | Age: 33
End: 2023-02-24

## 2023-02-24 ENCOUNTER — PRE VISIT (OUTPATIENT)
Dept: INFECTIOUS DISEASES | Facility: CLINIC | Age: 33
End: 2023-02-24
Payer: COMMERCIAL

## 2023-02-24 ENCOUNTER — LAB (OUTPATIENT)
Dept: LAB | Facility: CLINIC | Age: 33
End: 2023-02-24
Payer: COMMERCIAL

## 2023-02-24 ENCOUNTER — OFFICE VISIT (OUTPATIENT)
Dept: INFECTIOUS DISEASES | Facility: CLINIC | Age: 33
End: 2023-02-24
Attending: INTERNAL MEDICINE
Payer: COMMERCIAL

## 2023-02-24 VITALS
SYSTOLIC BLOOD PRESSURE: 120 MMHG | WEIGHT: 171 LBS | BODY MASS INDEX: 23.85 KG/M2 | HEART RATE: 89 BPM | DIASTOLIC BLOOD PRESSURE: 83 MMHG | TEMPERATURE: 98.4 F | OXYGEN SATURATION: 94 %

## 2023-02-24 DIAGNOSIS — B20 AIDS (ACQUIRED IMMUNODEFICIENCY SYNDROME), CD4 <=200 (H): ICD-10-CM

## 2023-02-24 DIAGNOSIS — Z21 HIV-1 (HUMAN IMMUNODEFICIENCY VIRUS I) (H): ICD-10-CM

## 2023-02-24 DIAGNOSIS — B20 AIDS (ACQUIRED IMMUNODEFICIENCY SYNDROME), CD4 <=200 (H): Primary | ICD-10-CM

## 2023-02-24 LAB
ALBUMIN SERPL BCG-MCNC: 4.8 G/DL (ref 3.5–5.2)
ALBUMIN UR-MCNC: NEGATIVE MG/DL
ALP SERPL-CCNC: 136 U/L (ref 40–129)
ALT SERPL W P-5'-P-CCNC: 78 U/L (ref 10–50)
AMYLASE SERPL-CCNC: 71 U/L (ref 28–100)
ANION GAP SERPL CALCULATED.3IONS-SCNC: 11 MMOL/L (ref 7–15)
APPEARANCE UR: CLEAR
AST SERPL W P-5'-P-CCNC: 48 U/L (ref 10–50)
BASOPHILS # BLD AUTO: 0 10E3/UL (ref 0–0.2)
BASOPHILS NFR BLD AUTO: 1 %
BILIRUB SERPL-MCNC: 0.5 MG/DL
BILIRUB UR QL STRIP: NEGATIVE
BUN SERPL-MCNC: 8.8 MG/DL (ref 6–20)
C TRACH DNA SPEC QL NAA+PROBE: NEGATIVE
C TRACH DNA SPEC QL NAA+PROBE: NEGATIVE
CALCIUM SERPL-MCNC: 10 MG/DL (ref 8.6–10)
CHLORIDE SERPL-SCNC: 100 MMOL/L (ref 98–107)
CHOLEST SERPL-MCNC: 160 MG/DL
CMV IGG SERPL IA-ACNC: <0.2 U/ML
CMV IGG SERPL IA-ACNC: NORMAL
COLOR UR AUTO: ABNORMAL
CREAT SERPL-MCNC: 0.57 MG/DL (ref 0.67–1.17)
DEPRECATED HCO3 PLAS-SCNC: 28 MMOL/L (ref 22–29)
EOSINOPHIL # BLD AUTO: 0 10E3/UL (ref 0–0.7)
EOSINOPHIL NFR BLD AUTO: 1 %
ERYTHROCYTE [DISTWIDTH] IN BLOOD BY AUTOMATED COUNT: 12.7 % (ref 10–15)
GFR SERPL CREATININE-BSD FRML MDRD: >90 ML/MIN/1.73M2
GLUCOSE SERPL-MCNC: 96 MG/DL (ref 70–99)
GLUCOSE UR STRIP-MCNC: NEGATIVE MG/DL
HAV IGG SER QL IA: REACTIVE
HBV CORE AB SERPL QL IA: NONREACTIVE
HBV SURFACE AB SERPL IA-ACNC: 1.55 M[IU]/ML
HBV SURFACE AB SERPL IA-ACNC: NONREACTIVE M[IU]/ML
HBV SURFACE AG SERPL QL IA: NONREACTIVE
HCT VFR BLD AUTO: 44.4 % (ref 40–53)
HDLC SERPL-MCNC: 40 MG/DL
HGB BLD-MCNC: 15.4 G/DL (ref 13.3–17.7)
HGB UR QL STRIP: NEGATIVE
IMM GRANULOCYTES # BLD: 0 10E3/UL
IMM GRANULOCYTES NFR BLD: 0 %
KETONES UR STRIP-MCNC: NEGATIVE MG/DL
LDLC SERPL CALC-MCNC: 103 MG/DL
LEUKOCYTE ESTERASE UR QL STRIP: NEGATIVE
LIPASE SERPL-CCNC: 33 U/L (ref 13–60)
LYMPHOCYTES # BLD AUTO: 0.6 10E3/UL (ref 0.8–5.3)
LYMPHOCYTES NFR BLD AUTO: 16 %
Lab: NORMAL
MCH RBC QN AUTO: 29.2 PG (ref 26.5–33)
MCHC RBC AUTO-ENTMCNC: 34.7 G/DL (ref 31.5–36.5)
MCV RBC AUTO: 84 FL (ref 78–100)
MONOCYTES # BLD AUTO: 0.3 10E3/UL (ref 0–1.3)
MONOCYTES NFR BLD AUTO: 7 %
MUCOUS THREADS #/AREA URNS LPF: PRESENT /LPF
N GONORRHOEA DNA SPEC QL NAA+PROBE: NEGATIVE
N GONORRHOEA DNA SPEC QL NAA+PROBE: NEGATIVE
NEUTROPHILS # BLD AUTO: 2.6 10E3/UL (ref 1.6–8.3)
NEUTROPHILS NFR BLD AUTO: 75 %
NITRATE UR QL: NEGATIVE
NONHDLC SERPL-MCNC: 120 MG/DL
NRBC # BLD AUTO: 0 10E3/UL
NRBC BLD AUTO-RTO: 0 /100
PERFORMING LABORATORY: NORMAL
PH UR STRIP: 7 [PH] (ref 5–7)
PLATELET # BLD AUTO: 206 10E3/UL (ref 150–450)
POTASSIUM SERPL-SCNC: 4.4 MMOL/L (ref 3.4–5.3)
PROT SERPL-MCNC: 8.7 G/DL (ref 6.4–8.3)
RBC # BLD AUTO: 5.28 10E6/UL (ref 4.4–5.9)
RBC URINE: 1 /HPF
SODIUM SERPL-SCNC: 139 MMOL/L (ref 136–145)
SP GR UR STRIP: 1.01 (ref 1–1.03)
TEST NAME: NORMAL
TRIGL SERPL-MCNC: 85 MG/DL
UROBILINOGEN UR STRIP-MCNC: NORMAL MG/DL
WBC # BLD AUTO: 3.5 10E3/UL (ref 4–11)
WBC URINE: <1 /HPF

## 2023-02-24 PROCEDURE — 87591 N.GONORRHOEAE DNA AMP PROB: CPT | Performed by: INTERNAL MEDICINE

## 2023-02-24 PROCEDURE — 86706 HEP B SURFACE ANTIBODY: CPT | Performed by: PATHOLOGY

## 2023-02-24 PROCEDURE — 87389 HIV-1 AG W/HIV-1&-2 AB AG IA: CPT | Mod: XU | Performed by: PATHOLOGY

## 2023-02-24 PROCEDURE — 87340 HEPATITIS B SURFACE AG IA: CPT | Performed by: PATHOLOGY

## 2023-02-24 PROCEDURE — 87904 PHENOTYPE DNA HIV W/CLT ADD: CPT | Performed by: PATHOLOGY

## 2023-02-24 PROCEDURE — 87901 NFCT AGT GNTYP ALYS HIV1 REV: CPT | Performed by: PATHOLOGY

## 2023-02-24 PROCEDURE — 87491 CHLMYD TRACH DNA AMP PROBE: CPT | Performed by: INTERNAL MEDICINE

## 2023-02-24 PROCEDURE — 80053 COMPREHEN METABOLIC PANEL: CPT | Performed by: PATHOLOGY

## 2023-02-24 PROCEDURE — 82150 ASSAY OF AMYLASE: CPT | Performed by: PATHOLOGY

## 2023-02-24 PROCEDURE — 87906 NFCT AGT GNTYP ALYS HIV1: CPT | Performed by: PATHOLOGY

## 2023-02-24 PROCEDURE — 36415 COLL VENOUS BLD VENIPUNCTURE: CPT | Performed by: PATHOLOGY

## 2023-02-24 PROCEDURE — 81001 URINALYSIS AUTO W/SCOPE: CPT | Performed by: PATHOLOGY

## 2023-02-24 PROCEDURE — 99417 PROLNG OP E/M EACH 15 MIN: CPT | Performed by: INTERNAL MEDICINE

## 2023-02-24 PROCEDURE — 80061 LIPID PANEL: CPT | Performed by: PATHOLOGY

## 2023-02-24 PROCEDURE — 83690 ASSAY OF LIPASE: CPT | Performed by: PATHOLOGY

## 2023-02-24 PROCEDURE — 86701 HIV-1ANTIBODY: CPT | Performed by: PATHOLOGY

## 2023-02-24 PROCEDURE — 99000 SPECIMEN HANDLING OFFICE-LAB: CPT | Performed by: PATHOLOGY

## 2023-02-24 PROCEDURE — 86644 CMV ANTIBODY: CPT | Performed by: PATHOLOGY

## 2023-02-24 PROCEDURE — 81381 HLA I TYPING 1 ALLELE HR: CPT | Performed by: PATHOLOGY

## 2023-02-24 PROCEDURE — 87903 PHENOTYPE DNA HIV W/CULTURE: CPT | Performed by: PATHOLOGY

## 2023-02-24 PROCEDURE — 87536 HIV-1 QUANT&REVRSE TRNSCRPJ: CPT | Performed by: PATHOLOGY

## 2023-02-24 PROCEDURE — 99205 OFFICE O/P NEW HI 60 MIN: CPT | Performed by: INTERNAL MEDICINE

## 2023-02-24 PROCEDURE — 99213 OFFICE O/P EST LOW 20 MIN: CPT | Performed by: INTERNAL MEDICINE

## 2023-02-24 PROCEDURE — 85025 COMPLETE CBC W/AUTO DIFF WBC: CPT | Performed by: PATHOLOGY

## 2023-02-24 PROCEDURE — 86702 HIV-2 ANTIBODY: CPT | Performed by: PATHOLOGY

## 2023-02-24 PROCEDURE — 86708 HEPATITIS A ANTIBODY: CPT | Performed by: PATHOLOGY

## 2023-02-24 PROCEDURE — 86704 HEP B CORE ANTIBODY TOTAL: CPT | Performed by: PATHOLOGY

## 2023-02-24 PROCEDURE — 87900 PHENOTYPE INFECT AGENT DRUG: CPT | Mod: XU | Performed by: PATHOLOGY

## 2023-02-24 RX ORDER — SULFAMETHOXAZOLE/TRIMETHOPRIM 800-160 MG
1 TABLET ORAL
Qty: 30 TABLET | Refills: 0 | Status: SHIPPED | OUTPATIENT
Start: 2023-02-24 | End: 2023-02-24

## 2023-02-24 RX ORDER — SULFAMETHOXAZOLE/TRIMETHOPRIM 800-160 MG
1 TABLET ORAL
Qty: 30 TABLET | Refills: 0 | Status: SHIPPED | OUTPATIENT
Start: 2023-02-24 | End: 2023-03-02

## 2023-02-24 NOTE — TELEPHONE ENCOUNTER
Pt requesting Bactrim and Biktarvy scripts to be changed from his [facility] to our [address] [place] [address] Pharmacy. Orders entered so pt could pick both up today.  Lluvia Rizzo RN

## 2023-02-24 NOTE — NURSING NOTE
Pt requested scripts be changed to [facility] so he could  today. Bactrim and Biktarvy changed from pt's [facility] to our [address] [place] [address] Pharmacy. Stephanie, [employer], updated.  Lluvia Rizzo RN

## 2023-02-24 NOTE — NURSING NOTE
Chief Complaint   Patient presents with     Consult     B20     Blood pressure 120/83, pulse 89, temperature 98.4  F (36.9  C), temperature source Oral, weight 77.6 kg (171 lb), SpO2 94 %.    Kieran Taylor on 2/24/2023 at 7:57 AM

## 2023-02-24 NOTE — TELEPHONE ENCOUNTER
HIV EDUCATION Teaching Flowsheet    Kamron Headley is a 32 year old male  B20, Aids.    Teaching Topic:HIV Disease Education  Person(s) involved in teaching: Patient  Motivation Level Hi   Asks Questions: Yes  Eager to Learn: Yes  Cooperative: Yes  Receptive: Yes    Teaching concerns addressed: Reviewed how human immune system works, reviewed how HIV virus attacks human immune system. Reviewed opportunistic infections. Reviewed pt's viral load and CD4 lab results, and how they relate to his present/future health.    Pt instructed to call nurse with any further questions    Instructional Materials Used/Given:verbal and instructional tray/figurines used for demonstration.    Pt verbalized teach back of education provided. Yes.    Lluvia Rizzo RN

## 2023-02-24 NOTE — TELEPHONE ENCOUNTER
RN Care Coordinator B20 Patient Assessment    New B20 Dx     Medical Hx    Patient presents to clinic accompanied by:SELF    Where patient received previous care:N/A    Where patient is at/ How patient is feeling:PT REPORTS FEAR AND SADNESS ABOUT DIAGNOSIS.    Patient's current knowledge/understanding of HIV:VERY KNOWLEDGEABLE, HAS RESEARCHED AND HAS FRIENDS WHO ARE POSITIVE.    Updated Med List:NO OTHER MEDS    Mental health concerns:HAS SUPPORT SYSTEM WITH FRIENDS, CHOOSING NOT TO TELL HIS PARENTS RIGHT AWAY (LIVES AT HOME WITH PARENTS)    Medical Concerns/ Goals of care:PT'S GOAL IS TO BECOME UNDETECTABLE AND TAKE CARE OF HPV ISSUES.    Discussed disclosure of status with partners:NO CURRENT PARTNERS        Care Coordination  Privacy concerns:DO NOT MAIL ANYTHING TO HIS ADDRESS.    Demographics updated:    JOY's signed:N/A    Insurance concerns:PT HAS JOB WITH HIS OWN INS.    Transportation concerns:NONE    Pharmacy preference (Mail or ):  HERE.    Case management:NO    Co-Pay Concerns:NO    Financial concerns:NO    Provider Preferences: [facility] DR KERRI AZAR Assessment of care needs:PT READY TO BEGIN TX.      Patient would benefit from social work assessment?(if yes, please see separate documentation from Lm Bhagat):     Patient to return on:DR MANNING 3/31 11:00.    Labs needed today:PT WILL GET LABS DRAWN TODAY.    Preferred Communication method:CAROLA

## 2023-02-24 NOTE — LETTER
2/24/2023       RE: Kamron Headley  [address]  [facility] [place] [address]     Dear Colleague,    Thank you for referring your patient, Kamron Headley, to the [facility] INFECTIOUS DISEASE CLINIC [place] [facility]. Please see a copy of my visit note below.    [facility]  General Infectious Disease/HIV Clinic Note: New Patient     Patient:  Kamron Headley, Date of birth 1990  Medical record number [id number]  Date of Visit:  02/24/2023     Assessment and Plan   1. HIV/AIDS, CD4 112/16%  2. L lip ulcer present for 5 weeks  3. Mild transaminitis  4. H/o HPV+ condyloma acuminatum (resected 2013)  5. Hepatitis B nonimmune (per 2016 labs)    Mr. Headley presents to ID clinic to establish care for a new HIV/AIDS diagnosis. We discussed at length that by his current CD4 <200, he is considered to have AIDS which essentially means he is immunocompromised. We also discussed starting therapy today (biktarvy) which he is amenable to. Given his CD4 is <200, we will also start bactrim for PJP ppx. We discussed at length the resources available to our HIV+ patients, and provided reassurance that we will be able to provide ART regardless of insurance status, and have multiple resources available to him.     Regarding current medical concerns, I am concerned about the lesion he has on his lip. It has been present for 5 weeks, and does not appear consistent with HSV (noted negative HSV Abs as well). Given his low CD4 the differential is broad, but I am most concerned about malignancy vs atypical infection. I placed an ENT referral for biopsy and cultures. He is also concerned about the recurrence of his anal warts. He has previously had treatment and is known to be HPV+. On exam today, the warts are large, but do not appear to be invasive. He is able to defecate without issue, and only occasionally has bleeding. He already has follow up  scheduled with Colorectal Surgery, so we will await their opinion. If his symptoms should worsen in the next few months, however, I will request a more urgent evaluation.     Mr. Headley is due for multiple vaccines, but I would like to hold off on these today as his immune response to vaccination is likely to be less robust than usual given low CD4. By the same logic, I would like to wait for a quant gold until he has a higher CD4 as the results right now could be inaccurate.     Plan:  1. New pt HIV labs to be drawn today, including resistance testing   2. GC/chlamydia oral and rectal swabs obtained today  3. Start biktarvy today  4. Start bactrim ppx today  5. ENT referral for biopsy of lip lesion   - In addition to pathology, please send for bacterial, fungal, and AFB cultures and smears.   6. Pt already has follow up with colorectal surgery re: rectal HPV+ condyloma acuminatum.   7. ID pharmacy consult for medication management  8. Follow up & labs (HIV VL, CD4) with me in 4 weeks  9. When CD4 > 200, Hep B series, shingrix, PCV20, Tdap, meningococcal vaccine, quant gold.     RTC: 1 month with me, 2 weeks with ID pharmacy        Madie Turner MD    Pager [phone]  Infectious Diseases         History of the Infectious Disease lllness:   Kamron Headley is a pleasant 32 year old gentleman with no significant past medical history who presents to ID clinic to discuss a new diagnosis of HIV/AIDS.     Mr. Headley denies prior knowledge or memory of this, but he was first diagnosed with HIV in 5/2016 when he presented to his PCP with oral ulcers. He was tested for multiple other STIs at the time that were negative. He did not follow up with ID at the time and has not been on medications previously. He had a shingles outbreak around that time. He has otherwise been well until recently when he saw his PCP for an oral ulcer. He was tested for multiple STIs, including HIV, and was positive. CD4 is 112 (16%). He has  been referred to us for additional management. He notes he has never been on PrEP before. He has not recently been sexually active, and has no current concerns for an STI. He did not undergo rectal or oral screening for GC/chlamydia and is interested in obtaining that today. He is also very eager to start HIV medications, and understands the need for lifelong adherence.     Mr. Headley has some mild sinus congestion without pressure or pain. He thinks he may have a deviated septum though he has not been evaluated for this. He has an interior lip lesion that has been present for around 5 weeks. The area was more painful when it first developed, but the pain has subsided somewhat. The size has remained stable since he first noted it. He also notes some swelling and face asymmetry associated with the lip lesion. He also notes that his anal warts have grown quite large. He is having some pain with defecation and occasionally notes blood when he wipes. He has also developed 2 small lesions on his penile shaft. He is feeling very anxious but coming to terms with his new HIV diagnosis. He otherwise feels well and denies headaches, vision changes, chest pain, shortness of breath, abdominal pain, constipation or diarrhea, joint pains, swelling, or rashes.     HIV:  -diagnosis: 2016  -previous ART regimens: none  -resistance: unknown, panel pending    Health care maintenance:   A. Vaccination/Serology status.   -Hepatitis A: Immune  -Hepatitis B: Nonimmune, nonexposed  -Strep pneumo: due (will wait until CD4 >200)  -TDaP: last 2012, due  -Zoster: due (will wait until CD4 >200)  -Meningococcal: due (previously had menomune)  -Influenza   -COVID: up to date  -Mpox: up to date    B. Routine Infectious Disease screening.   -Hepatitis C: negative (2023)   -Toxo IgG: negative (2023)  -CMV IGG: negative (2023)   -TB screen: due  -STD sceen: GC/CT: pending; RPR negative (2023);     C. Cardiovascular, renal and bone health.    -Cholesterol: getting today  -Blood pressure: 120/83  -Smoking status:   -Alcohol:  -Illicit drug use:  -Weight:   -Dexa:    D. Cancer screening (if applicable)  -Pap smear: +HPV condyloma, awaiting colorectal surgery evaluation      Review of Systems:  CONSTITUTIONAL:  No fevers or chills. No night sweats.  EYES: negative for icterus or acute vision changes.   ENT:  negative for hearing loss, tinnitus or sore throat  RESPIRATORY:  negative for cough, sputum, dyspnea  CARDIOVASCULAR:  negative for chest pain, heart palpitations  GASTROINTESTINAL:  negative for nausea, vomiting, diarrhea or constipation  GENITOURINARY:  negative for dysuria or hematuria.  HEME:  No easy bruising or bleeding  INTEGUMENT:  negative for rash or pruritus  NEURO:  Negative for headache or tremor.    Past Surgical History:   Procedure Laterality Date     EXAM UNDER ANESTHESIA, FULGURATE CONDYLOMA ANUS, COMBINED  5/30/2013    Procedure: COMBINED EXAM UNDER ANESTHESIA, FULGURATE CONDYLOMA ANUS;  Exam Under Anesthesia of Anus, Excision and Fulguration of Anal Condyloma;  Surgeon: Matias Lopez MD;  Location:  OR     NO HISTORY OF SURGERY         Family History   Problem Relation Age of Onset     Cancer Paternal Grandmother      Family History Negative No family hx of         no skin disorders       Social History     Social History Narrative     Not on file     Social History     Tobacco Use     Smoking status: Never     Smokeless tobacco: Never   Vaping Use     Vaping Use: Never used   Substance Use Topics     Alcohol use: No     Drug use: No       Immunization History   Administered Date(s) Administered     COVID-19 Vaccine 18+ (Moderna) 04/15/2021, 05/13/2021, 12/01/2021     COVID-19 Vaccine Bivalent Booster 18+ (Moderna) 12/17/2022     DTAP (<7y) 05/03/1995     HEPA 08/22/2008, 10/29/2012     HPV9 02/16/2023     HepB 09/10/1999, 11/01/1999, 05/01/2000     Hib (PRP-T) 03/22/1991, 05/17/1991, 09/16/1991     Influenza Vaccine  >6 months (Alfuria,Fluzone) 12/17/2022     MMR 09/10/1999     Meningococcal (Menomune ) 08/22/2008     OPV, trivalent, live 05/03/1995     Smallpox/Mpox Vaccine Subcutaneous (JYNNEOS) 08/19/2022, 09/28/2022     TD (ADULT, 7+) 06/17/2002     TDAP Vaccine (Adacel) 10/29/2012       Patient Active Problem List   Diagnosis     CARDIOVASCULAR SCREENING; LDL GOAL LESS THAN 160     Tinea corporis     Skin rash     Elevated LFTs     Anal skin tag     High risk HPV infection     Anal warts     HIV-1 (human immunodeficiency virus I) (H)     Oral aphthous ulcer     Borderline personality disorder (H)     Personality disorder (H)     Chronic hepatitis C without hepatic coma (H)     AIDS (acquired immunodeficiency syndrome), CD4 <=200 (H)     No Known Allergies         Physical Exam:   Vitals were reviewed.  All vitals stable  /83 (BP Location: Right arm, Patient Position: Sitting, Cuff Size: Adult Regular)   Pulse 89   Temp 98.4  F (36.9  C) (Oral)   Wt 77.6 kg (171 lb)   SpO2 94%   BMI 23.85 kg/m    Wt Readings from Last 4 Encounters:   02/16/23 75.3 kg (166 lb)   07/07/21 79.4 kg (175 lb)   07/26/16 64.9 kg (143 lb)   05/24/16 67.7 kg (149 lb 4 oz)     Exam:  GENERAL: well-developed, well-nourished, alert, oriented, in no acute distress.  HEAD: Head is normocephalic, atraumatic   EYES: Eyes have anicteric sclerae.    ENT: Oropharynx is moist without exudates or ulcers. L inner lip with ~1.5 cm superficial erosion, brown film covering, mild edema of lip surrounding, mildly TTP.   NECK: Supple. No cervical LAD.   LUNGS: Clear to auscultation b/l. Normal WOB on RA.   Back: No TTP throughout spine.   CARDIOVASCULAR: Regular rate and rhythm with no murmurs, gallops or rubs.  ABDOMEN: Normal bowel sounds, soft, nontender.  SKIN: No acute rashes.   : ~3cm rectal condyloma acuminatum, no hemorrhage or areas of ulceration. 2 small condyloma on R side of penile shaft.   NEUROLOGIC: Grossly nonfocal.         Laboratory Data:      Metabolic Studies    Recent Labs   Lab Test 02/16/23  1119      POTASSIUM 3.8   CHLORIDE 100   CO2 24   ANIONGAP 15   BUN 8.5   CR 0.58*   GFRESTIMATED >90   GLC 85   DENISHA 10.1*       Hepatic Studies    Recent Labs   Lab Test 02/16/23  1119   BILITOTAL 0.6   ALKPHOS 152*   PROTTOTAL 8.7*   ALBUMIN 4.7   AST 61*   *       Hematology Studies     Recent Labs   Lab Test 02/16/23  1119   WBC 3.3*   HGB 14.5   HCT 42.1        Microbiology:  Last Culture results with specimen source  No results found for: CULT Specimen Description   Date Value Ref Range Status   05/24/2016 Throat  Final   05/24/2016 Urine  Final   08/20/2014 Urine  Final   09/03/2013 Urine  Final   02/19/2013 Knee  Final        Virology:  Hepatitis B Testing     Recent Labs   Lab Test 05/24/16  0913   AUSAB 2.26   HEPBANG Nonreactive      Hepatitis C Antibody   Date Value Ref Range Status   02/16/2023 Nonreactive Nonreactive Final   05/24/2016 (A) NR Final    Reactive   A reactive result indicates one of the following 1) current HCV infection 2)   past HCV infection that has resolved or 3) false positivity. The CDC recommends   that a reactive result should be followed by Nucleic acid testing for HCV RNA.  If HCV RNA is detected, that indicates current HCV infection. If HCV RNA is not   detected, that indicates either past, resolved HCV infection, or false HCV   antibody positivity.   Assay performance characteristics have not been established for newborns,   infants, and children     08/20/2014 Negative NEG Final     Herpes Simplex Virus Type 1 IgG Antibody   Date Value Ref Range Status   02/16/2023 No HSV-1 IgG antibodies detected. No HSV-1 IgG antibodies detected Final   02/16/2023 No HSV-1 IgG antibodies detected. No HSV-1 IgG antibodies detected Final     Herpes Simplex Virus Type 2 IgG Antibody   Date Value Ref Range Status   02/16/2023 No HSV-2 IgG antibodies detected. No HSV-2 IgG antibodies detected Final   02/16/2023 No  HSV-2 IgG antibodies detected. No HSV-2 IgG antibodies detected Final     Imaging:   No results found for this or any previous visit.        Madie Turner MD

## 2023-02-27 LAB
HIV 1+2 AB+HIV1 P24 AG SERPL QL IA: REACTIVE
HIV 1+2 AB+HIV1P24 AG SERPLBLD IA.RAPID: ABNORMAL
HIV 2 AB SERPLBLD QL IA.RAPID: NONREACTIVE
HIV1 AB SERPLBLD QL IA.RAPID: REACTIVE

## 2023-02-27 NOTE — TELEPHONE ENCOUNTER
FUTURE VISIT INFORMATION      FUTURE VISIT INFORMATION:    Date: 3/14/23    Time: 2:40pm    Location: [facility] [place]  REFERRAL INFORMATION:    Referring provider:  Madie Turner MD    Referring providers clinic:   INFECTIOUS DISEASE    Reason for visit/diagnosis  Ulcerating lip lesion X5 weeks in pt with HIV/AIDS-  Referred by Madie Turner MD in [facility]    RECORDS REQUESTED FROM:       Clinic name Comments Records Status Imaging Status   [facility]  2/24/23- note with Madie Turner MD [facility]  2/16/23- note with Tess Geller MD Epic

## 2023-02-28 LAB
HIV1 RNA # PLAS NAA DL=20: ABNORMAL COPIES/ML
HIV1 RNA SERPL NAA+PROBE-LOG#: 4.5 {LOG_COPIES}/ML

## 2023-03-02 ENCOUNTER — OFFICE VISIT (OUTPATIENT)
Dept: PHARMACY | Facility: CLINIC | Age: 33
End: 2023-03-02
Attending: INTERNAL MEDICINE
Payer: COMMERCIAL

## 2023-03-02 DIAGNOSIS — B20 AIDS (ACQUIRED IMMUNODEFICIENCY SYNDROME), CD4 <=200 (H): ICD-10-CM

## 2023-03-02 DIAGNOSIS — Z79.2 PREVENTIVE ANTIBIOTIC: ICD-10-CM

## 2023-03-02 DIAGNOSIS — Z21 HIV-1 (HUMAN IMMUNODEFICIENCY VIRUS I) (H): Primary | ICD-10-CM

## 2023-03-02 PROCEDURE — 99207 PR NO CHARGE LOS: CPT | Performed by: PHARMACIST

## 2023-03-02 RX ORDER — SULFAMETHOXAZOLE/TRIMETHOPRIM 800-160 MG
1 TABLET ORAL
Qty: 30 TABLET | Refills: 0 | Status: SHIPPED | OUTPATIENT
Start: 2023-03-02 | End: 2023-03-19

## 2023-03-02 NOTE — PROGRESS NOTES
Disease State Management Encounter:                          Kamron Headley is a 32 year old male coming in for for an initial visit. He was referred to me from Dr. Turner.      Reason for visit: new antiretroviral therapy start.    HIV: patient is taking Biktarvy once daily every morning for HIV and is tolerating this well. Reports a headache that is overall mild. Using a pill box which is helpful. Interested in prescriptions being mailed to him and a 90 day supply in the future. Also interested in Cabenuva as a future option.  Diagnosis: 2016  Past regimens: none  Genotype: in process  HIV VL: see below  CD4: 112 (2/16/23)  Hepatitis A: immune   Hepatitis B: not immune, negative core and antigen   Hepatitis C antibody: not detected 2/2023  Treponema Ab: nonreactive 2/2023  Immunizations: covid 3/3 plus booster, monkeypox 2/2, flu, HPV 1/3    Interested in starting the following supplements but isn't sure if it's okay to take with Biktarvy:     Vitamin D    Psyllium husk     Antacids     Fish oil     Apple cider vinegar     Prophylactic antibiotic/AIDs:  Currently taking Bactrim DS once daily in the morning for PCP prophylaxis. Tolerating well so far but isn't sure if he needs a refill.     Component      Latest Ref Rng & Units 2/16/2023 2/24/2023   HIV-1 RNA Copies/mL, Instrument      <1 copies/mL 23,336 (H) 28,938 (H)     BP Readings from Last 3 Encounters:   02/24/23 120/83   02/16/23 112/68   07/07/21 138/78     Recent Labs   Lab Test 02/24/23  1029   CHOL 160   HDL 40   *   TRIG 85     No results found for: A1C    CBC RESULTS: Recent Labs   Lab Test 02/24/23  1029   WBC 3.5*   RBC 5.28   HGB 15.4   HCT 44.4   MCV 84   MCH 29.2   MCHC 34.7   RDW 12.7        Last Comprehensive Metabolic Panel:  Sodium   Date Value Ref Range Status   02/24/2023 139 136 - 145 mmol/L Final     Potassium   Date Value Ref Range Status   02/24/2023 4.4 3.4 - 5.3 mmol/L Final     Chloride   Date Value Ref Range Status    02/24/2023 100 98 - 107 mmol/L Final     Carbon Dioxide (CO2)   Date Value Ref Range Status   02/24/2023 28 22 - 29 mmol/L Final     Anion Gap   Date Value Ref Range Status   02/24/2023 11 7 - 15 mmol/L Final     Glucose   Date Value Ref Range Status   02/24/2023 96 70 - 99 mg/dL Final     Urea Nitrogen   Date Value Ref Range Status   02/24/2023 8.8 6.0 - 20.0 mg/dL Final     Creatinine   Date Value Ref Range Status   02/24/2023 0.57 (L) 0.67 - 1.17 mg/dL Final   01/31/2013 0.67 0.66 - 1.25 mg/dL Final     GFR Estimate   Date Value Ref Range Status   02/24/2023 >90 >60 mL/min/1.73m2 Final     Comment:     eGFR calculated using 2021 CKD-EPI equation.   01/31/2013 >90 >60 mL/min/1.7m2 Final     Calcium   Date Value Ref Range Status   02/24/2023 10.0 8.6 - 10.0 mg/dL Final     Bilirubin Total   Date Value Ref Range Status   02/24/2023 0.5 <=1.2 mg/dL Final   01/31/2013 0.9 0.2 - 1.3 mg/dL Final     Alkaline Phosphatase   Date Value Ref Range Status   02/24/2023 136 (H) 40 - 129 U/L Final   01/31/2013 84 40 - 150 U/L Final     ALT   Date Value Ref Range Status   02/24/2023 78 (H) 10 - 50 U/L Final   01/31/2013 94 (H) 0 - 70 U/L Final     AST   Date Value Ref Range Status   02/24/2023 48 10 - 50 U/L Final   01/31/2013 51 (H) 0 - 45 U/L Final     Today's Vitals: There were no vitals taken for this visit.    Assessment/Plan:    HIV:  specialty pharmacy may be a good fit for patient since he only takes a few medications and is interested in an automated process and having prescriptions mailed to him. Will send refill to  specialty pharmacy.     Reviewed drug interactions (no expected drug interactions with Biktarvy and supplements patient is interested in starting. Didn't add to med list today since he hasn't started anything yet). Okay to take polyvalent cations 6 hours before or 2 hours after Biktarvy or together with Biktarvy with food if he decides to start something with polyvalent cations in the future.  Calcium/iron antacids can be taken up to 2 hours before Biktarvy on an empty stomach or together with food.     Also reviewed:   Side effects  Risk of resistance   Missed dose management     Due: PCV20, hepB, tdap, MenACWY, rest of HPV series - complete at future visit    Patient had no additional questions at end of visit. Provided contact information in case he has questions in the future.     Prophylactic antibiotic/AIDs: stable. Will send refill. Recommended to continue until CD4 >200.     Follow-up: as needed    I spent 30 minutes with this patient today. All changes were made via collaborative practice agreement with Dr. Turner. A copy of the visit note was provided to the patient's provider(s).    A summary of these recommendations was sent via [employer].     Medication Therapy Recommendations  No medication therapy recommendations to display

## 2023-03-02 NOTE — Clinical Note
Hi - he's interested in a 90 day supply in the future if possible just an FYI for next visit! - Gayatri

## 2023-03-02 NOTE — PATIENT INSTRUCTIONS
Recommendations from today's disease management visit:                                                      Sent refills of Biktarvy and Bactrim to [facility] - please call when you need a refill (about 1 week before running out)  It's okay to take the following supplements at any time:   3.   Take supplements with calcium, magnesium, and iron 2 hours before or 6 hours after Biktarvy. You could also take them together with food.     Follow-up: as needed    To schedule another MTM appointment, please call the clinic directly or you may call the MTM scheduling line at [phone] or toll-free at [phone].     My Clinical Pharmacist's contact information:                                                      Please feel free to contact me with any questions or concerns you have.      Gayatri Bailey, PharmD, AAHIVP  Medication Therapy Management Pharmacist   March 2, 2023  [phone]

## 2023-03-05 LAB
HLA TYPE SA METHOD: NORMAL
HLA TYPE SA RESULT: NORMAL

## 2023-03-14 ENCOUNTER — PRE VISIT (OUTPATIENT)
Dept: OTOLARYNGOLOGY | Facility: CLINIC | Age: 33
End: 2023-03-14

## 2023-03-14 ENCOUNTER — OFFICE VISIT (OUTPATIENT)
Dept: OTOLARYNGOLOGY | Facility: CLINIC | Age: 33
End: 2023-03-14
Attending: INTERNAL MEDICINE
Payer: COMMERCIAL

## 2023-03-14 VITALS — WEIGHT: 171 LBS | HEIGHT: 72 IN | BODY MASS INDEX: 23.16 KG/M2

## 2023-03-14 DIAGNOSIS — Z21 HIV-1 (HUMAN IMMUNODEFICIENCY VIRUS I) (H): ICD-10-CM

## 2023-03-14 PROCEDURE — 99203 OFFICE O/P NEW LOW 30 MIN: CPT | Performed by: OTOLARYNGOLOGY

## 2023-03-14 ASSESSMENT — PAIN SCALES - GENERAL: PAINLEVEL: NO PAIN (0)

## 2023-03-14 NOTE — PATIENT INSTRUCTIONS
"You were seen in the clinic today by Dr. Alejandre. If you have any questions or concerns after your appointment, please call the clinic at [phone]. Press \"1\" for scheduling, press \"3\" for nurse advice.    2.   The following has been recommended for you based upon your appointment today:   -Apply gentian violet every three days as needed to your lip lesion.  This can be purchased online.    3.   Plan to return the clinic in 6 weeks for a phone visit.    Uyen BRADSHAW, RN  [facility]  Department of Otolaryngology  [phone]      "

## 2023-03-14 NOTE — PROGRESS NOTES
Otolaryngology Clinic      Name: Kamron Headley  MRN: [id number]  Age: 32 year old  : 1990  Referring provider: Madie Turner  2023      Chief Complaint:  Consultation    History of Present Illness:   Kamron Headley is a 32 year old male with a history of HIV/AIDS and HSV who presents for consultation regarding left lip ulcer. The patient was recently diagnosed with HIV with CD4 <200 so consulted with ID and was started on biktarvy and bactrim. At that time he also had a lesion to his left lower lip which had been present for about 5 weeks. These did not appear consistent with HSV so he was referred for further evaluation of these. He tells me today that he has had this for some time which seems to be improving in the last couple of days. He recently visited with his dentist who suspected this was due to an aphthous ulcer. He uses Listerine quite frequently. He also mentions that he has suspected that he has had a septal deviation for some time.     Active Medications:     Current Outpatient Medications:      bictegravir-emtricitabine-tenofovir (BIKTARVY) -25 MG per tablet, Take 1 tablet by mouth daily, Disp: 30 tablet, Rfl: 0     sulfamethoxazole-trimethoprim (BACTRIM DS) 800-160 MG tablet, Take 1 tablet by mouth daily before breakfast, Disp: 30 tablet, Rfl: 0      Allergies:   Patient has no known allergies.      Past Medical History:  Past Medical History:   Diagnosis Date     NO ACTIVE PROBLEMS      Patient Active Problem List   Diagnosis     CARDIOVASCULAR SCREENING; LDL GOAL LESS THAN 160     Tinea corporis     Skin rash     Elevated LFTs     Anal skin tag     High risk HPV infection     Anal warts     HIV-1 (human immunodeficiency virus I) (H)     Oral aphthous ulcer     Borderline personality disorder (H)     Personality disorder (H)     Chronic hepatitis C without hepatic coma (H)     AIDS (acquired immunodeficiency syndrome), CD4 <=200 (H)        Past Surgical History:  Past  Surgical History:   Procedure Laterality Date     EXAM UNDER ANESTHESIA, FULGURATE CONDYLOMA ANUS, COMBINED  5/30/2013    Procedure: COMBINED EXAM UNDER ANESTHESIA, FULGURATE CONDYLOMA ANUS;  Exam Under Anesthesia of Anus, Excision and Fulguration of Anal Condyloma;  Surgeon: Matias Lopez MD;  Location:  OR     NO HISTORY OF SURGERY         Family History:   Family History   Problem Relation Age of Onset     Cancer Paternal Grandmother      Family History Negative No family hx of         no skin disorders         Social History:   Social History     Tobacco Use     Smoking status: Never     Smokeless tobacco: Never   Vaping Use     Vaping Use: Never used   Substance Use Topics     Alcohol use: No     Drug use: No       Review of Systems:   Pertinent items are noted in HPI or as in patient entered ROS below, remainder of complete ROS is negative.   No flowsheet data found.      Physical Exam:   Ht 1.829 m (6')   Wt 77.6 kg (171 lb)   BMI 23.19 kg/m       Constitutional:  The patient was unaccompanied, well-groomed, and in no acute distress.    Skin:  Warm and pink.    Neurologic:  Alert and oriented x 3.  CN's III-XII within normal limits.  Voice normal.   Psychiatric:  The patient's affect was calm, cooperative, and appropriate.    Respiratory:  Breathing comfortably without stridor or exertion of accessory muscles.    Eyes: Extraocular movement intact.    Head:  Normocephalic and atraumatic.  No lesions or scars.    Ears:  Pinnae and tragus non-tender.  EAC's and TM's were clear.   Nose:  Sinuses were non-tender.  Anterior rhinoscopy revealed midline septum and absence of purulence or polyps.  Inferior septal spur. No inferior turbinate hypertrophy.  OC/OP:  Normal tongue, floor of mouth, buccal mucosa, and palate.  No masses on inspection or palpation.  No abnormal lymph tissue in the oropharynx.  The pterygoid region is non-tender.  Soft 6-7 mm ulcer of the surface of the mucosa on the left inner  lip.   Neck:  Supple with normal laryngeal and tracheal landmarks.  The parotid beds were without masses.  No palpable thyroid.  Lymphatic:  There is no palpable lymphadenopathy in the neck.     Assessment and Plan:    ICD-10-CM    1. HIV-1 (human immunodeficiency virus I) (H)  B20 Adult ENT  Referral         Kamron Headley is a 32 year old male with a history of HIV/AIDS and HSV who presents for consultation regarding left lip ulcer. He has an ulcer on the left side of his lower lip which is soft to palpation and does not seem consistent with cancer or aphthous lesion. I suspect this may be due to a fungal infection and discussed that Listerine products may contribute to this. I applied some gentian violet to the area today and recommend he continue using this.     He does not have a septal deviation on exam today and I suspect his nasal congestion is likely due to his turbinates. I reviewed option for surgery should this become significantly bothersome.     Follow-up: No follow-ups on file.         Scribe Disclosure:  I, Berta Hackett, am serving as a scribe to document services personally performed by Nain Alejandre MD at this visit, based upon the provider's statements to me. All documentation has been reviewed by the aforementioned provider prior to being entered into the official medical record.

## 2023-03-14 NOTE — LETTER
3/14/2023       RE: Kamron Headley  [address]  [facility] [address]     Dear Colleague,    Thank you for referring your patient, Kamron Headley, to the [facility] [place] at [facility]. Please see a copy of my visit note below.      Otolaryngology Clinic      Name: Kamron Headley  MRN: [id number]  Age: 32 year old  : 1990  Referring provider: Madie Turner  2023      Chief Complaint:  Consultation    History of Present Illness:   Kamron Headley is a 32 year old male with a history of HIV/AIDS and HSV who presents for consultation regarding left lip ulcer. The patient was recently diagnosed with HIV with CD4 <200 so consulted with ID and was started on biktarvy and bactrim. At that time he also had a lesion to his left lower lip which had been present for about 5 weeks. These did not appear consistent with HSV so he was referred for further evaluation of these. He tells me today that he has had this for some time which seems to be improving in the last couple of days. He recently visited with his dentist who suspected this was due to an aphthous ulcer. He uses Listerine quite frequently. He also mentions that he has suspected that he has had a septal deviation for some time.     Active Medications:     Current Outpatient Medications:      bictegravir-emtricitabine-tenofovir (BIKTARVY) -25 MG per tablet, Take 1 tablet by mouth daily, Disp: 30 tablet, Rfl: 0     sulfamethoxazole-trimethoprim (BACTRIM DS) 800-160 MG tablet, Take 1 tablet by mouth daily before breakfast, Disp: 30 tablet, Rfl: 0      Allergies:   Patient has no known allergies.      Past Medical History:  Past Medical History:   Diagnosis Date     NO ACTIVE PROBLEMS      Patient Active Problem List   Diagnosis     CARDIOVASCULAR SCREENING; LDL GOAL LESS THAN 160     Tinea corporis     Skin rash     Elevated LFTs     Anal skin tag     High  risk HPV infection     Anal warts     HIV-1 (human immunodeficiency virus I) (H)     Oral aphthous ulcer     Borderline personality disorder (H)     Personality disorder (H)     Chronic hepatitis C without hepatic coma (H)     AIDS (acquired immunodeficiency syndrome), CD4 <=200 (H)        Past Surgical History:  Past Surgical History:   Procedure Laterality Date     EXAM UNDER ANESTHESIA, FULGURATE CONDYLOMA ANUS, COMBINED  5/30/2013    Procedure: COMBINED EXAM UNDER ANESTHESIA, FULGURATE CONDYLOMA ANUS;  Exam Under Anesthesia of Anus, Excision and Fulguration of Anal Condyloma;  Surgeon: Matias Lopez MD;  Location: [facility] OR     NO HISTORY OF SURGERY         Family History:   Family History   Problem Relation Age of Onset     Cancer Paternal Grandmother      Family History Negative No family hx of         no skin disorders         Social History:   Social History     Tobacco Use     Smoking status: Never     Smokeless tobacco: Never   Vaping Use     Vaping Use: Never used   Substance Use Topics     Alcohol use: No     Drug use: No       Review of Systems:   Pertinent items are noted in HPI or as in patient entered ROS below, remainder of complete ROS is negative.   No flowsheet data found.      Physical Exam:   Ht 1.829 m (6')   Wt 77.6 kg (171 lb)   BMI 23.19 kg/m       Constitutional:  The patient was unaccompanied, well-groomed, and in no acute distress.    Skin:  Warm and pink.    Neurologic:  Alert and oriented x 3.  CN's III-XII within normal limits.  Voice normal.   Psychiatric:  The patient's affect was calm, cooperative, and appropriate.    Respiratory:  Breathing comfortably without stridor or exertion of accessory muscles.    Eyes: Extraocular movement intact.    Head:  Normocephalic and atraumatic.  No lesions or scars.    Ears:  Pinnae and tragus non-tender.  EAC's and TM's were clear.   Nose:  Sinuses were non-tender.  Anterior rhinoscopy revealed midline septum and absence of purulence or  polyps.  Inferior septal spur. No inferior turbinate hypertrophy.  OC/OP:  Normal tongue, floor of mouth, buccal mucosa, and palate.  No masses on inspection or palpation.  No abnormal lymph tissue in the oropharynx.  The pterygoid region is non-tender.  Soft 6-7 mm ulcer of the surface of the mucosa on the left inner lip.   Neck:  Supple with normal laryngeal and tracheal landmarks.  The parotid beds were without masses.  No palpable thyroid.  Lymphatic:  There is no palpable lymphadenopathy in the neck.     Assessment and Plan:    ICD-10-CM    1. HIV-1 (human immunodeficiency virus I) (H)  B20 Adult ENT  Referral         Kamron Headley is a 32 year old male with a history of HIV/AIDS and HSV who presents for consultation regarding left lip ulcer. He has an ulcer on the left side of his lower lip which is soft to palpation and does not seem consistent with cancer or aphthous lesion. I suspect this may be due to a fungal infection and discussed that Listerine products may contribute to this. I applied some gentian violet to the area today and recommend he continue using this.     He does not have a septal deviation on exam today and I suspect his nasal congestion is likely due to his turbinates. I reviewed option for surgery should this become significantly bothersome.     Follow-up: No follow-ups on file.         Scribe Disclosure:  I, Berta Hackett, am serving as a scribe to document services personally performed by Nain Alejandre MD at this visit, based upon the provider's statements to me. All documentation has been reviewed by the aforementioned provider prior to being entered into the official medical record.           Again, thank you for allowing me to participate in the care of your patient.      Sincerely,    Nain Alejandre MD

## 2023-03-19 ENCOUNTER — MYC REFILL (OUTPATIENT)
Dept: PHARMACY | Facility: CLINIC | Age: 33
End: 2023-03-19
Payer: COMMERCIAL

## 2023-03-19 DIAGNOSIS — B20 AIDS (ACQUIRED IMMUNODEFICIENCY SYNDROME), CD4 <=200 (H): ICD-10-CM

## 2023-03-19 DIAGNOSIS — Z21 HIV-1 (HUMAN IMMUNODEFICIENCY VIRUS I) (H): ICD-10-CM

## 2023-03-21 NOTE — TELEPHONE ENCOUNTER
"Requested Prescriptions   Pending Prescriptions Disp Refills     bictegravir-emtricitabine-tenofovir (BIKTARVY) -25 MG per tablet 30 tablet 0     Sig: Take 1 tablet by mouth daily       Antiretroviral Agents Failed - 3/19/2023 10:02 AM        Failed - Refills for this medication group require provider approval        Failed - Serum HIV less than 200 on file in past 3 mos.     No lab results found.          Failed - CD4 count greater than 300 on file in past 3 mos.     Recent Labs   Lab Test 02/16/23  1119   ACD4 112*             Passed - Patient is age 6 or older        Passed - AST less than 55 on file in past 3 mos     Recent Labs   Lab Test 02/24/23  1029   AST 48             Passed - Recent (3 mo) or future (30 days) visit within the authorizing provider's specialty     Patient had office visit in the last 3 months or has a visit in the next 30 days with authorizing provider or within the authorizing provider's specialty.  See \"Patient Info\" tab in inbasket, or \"Choose Columns\" in Meds & Orders section of the refill encounter.                Passed - Medication is active on med list        Passed - ALT less than 90 on file in past 3 mos.     Recent Labs   Lab Test 02/24/23  1029   ALT 78*                sulfamethoxazole-trimethoprim (BACTRIM DS) 800-160 MG tablet 30 tablet 0     Sig: Take 1 tablet by mouth daily before breakfast       Oral Acne/Rosacea Medications Protocol Failed - 3/19/2023 10:02 AM        Failed - Confirmation of diagnosis is required     Please confirm diagnosis is acne or rosacea.     If NOT acne or rosacea; refer request to provider for further evaluation.    If diagnosis IS acne or rosacea, OK to refill BASED ON PREVIOUS REFILL CLINICAL NOTE RECOMMENDATION.          Passed - Patient is 12 years of age or older        Passed - Recent (12 mo) or future (30 days) visit within the authorizing provider's specialty     Patient has had an office visit with the authorizing provider or a " "provider within the authorizing providers department within the previous 12 mos or has a future within next 30 days. See \"Patient Info\" tab in inbasket, or \"Choose Columns\" in Meds & Orders section of the refill encounter.              Passed - Medication is active on med list         Routing refill request to provider for review/approval because medication did not pass protocol.    Veronika Guzman RN  [place]   "

## 2023-03-23 RX ORDER — SULFAMETHOXAZOLE/TRIMETHOPRIM 800-160 MG
1 TABLET ORAL
Qty: 30 TABLET | Refills: 0 | Status: SHIPPED | OUTPATIENT
Start: 2023-03-23 | End: 2023-05-09

## 2023-03-25 ENCOUNTER — LAB (OUTPATIENT)
Dept: LAB | Facility: CLINIC | Age: 33
End: 2023-03-25
Payer: COMMERCIAL

## 2023-03-25 DIAGNOSIS — B20 AIDS (ACQUIRED IMMUNODEFICIENCY SYNDROME), CD4 <=200 (H): ICD-10-CM

## 2023-03-25 PROCEDURE — 87536 HIV-1 QUANT&REVRSE TRNSCRPJ: CPT | Performed by: PATHOLOGY

## 2023-03-25 PROCEDURE — 36415 COLL VENOUS BLD VENIPUNCTURE: CPT | Performed by: PATHOLOGY

## 2023-03-25 PROCEDURE — 99000 SPECIMEN HANDLING OFFICE-LAB: CPT | Performed by: PATHOLOGY

## 2023-03-28 ENCOUNTER — PHARMACY VISIT (OUTPATIENT)
Dept: PHARMACY | Facility: CLINIC | Age: 33
End: 2023-03-28
Payer: COMMERCIAL

## 2023-03-28 LAB
HIV1 RNA # PLAS NAA DL=20: <20 COPIES/ML
HIV1 RNA SERPL NAA+PROBE-LOG#: <1.3 {LOG_COPIES}/ML

## 2023-03-28 NOTE — PROGRESS NOTES
HIV Clinical Follow Up Assessment      Activity Medications    BIKTARVY     Care Details    What are the patient's goals for this therapy?   ? Undetectable viral load      Is the patient meeting treatment goals?   ? Progressing towards goals      Select patient's HIV therapy type:   ? HIV Treatment    When did you receive your HIV diagnosis? (year of diagnosis)   ? 2023    Does the patient report viral suppression at their last lab visit?   ? No        Summary Notes  Spoke with pt. Confirmed texting is preferred communication and that he is receiving the text messages. Pt has seen ID MD and MTM in the past month, and sees ID MD again on Friday. Pt has no additional questions today. Discussed the workflow of the pharmacy and how the refill process works. Will reach out at time of next refill.    Last refill: 30 days of Biktarvy and Bactrim mailed on 3/20/23.     Chloe Grace, LyndseyD  Therapy Management Pharmacist  [facility]

## 2023-03-29 ENCOUNTER — PRE VISIT (OUTPATIENT)
Dept: GASTROENTEROLOGY | Facility: CLINIC | Age: 33
End: 2023-03-29
Payer: COMMERCIAL

## 2023-03-29 LAB — MISCELLANEOUS TEST 1 (ARUP): NORMAL

## 2023-03-29 NOTE — PROGRESS NOTES
[facility]  General Infectious Diseases/HIV Progress Note     Patient:  Kamron Headley, Date of birth 1990  Medical record number [id number]  Date of Visit:  03/29/2023    Assessment and Plan:   1. HIV/AIDS, CD4 112/16%  2. L lip ulcers, slightly improved  3. Mild transaminitis  4. H/o HPV+ condyloma acuminatum (resected 2013)  5. Penile warts  5. Hepatitis B nonimmune, nonexposed    Mr. Headley is doing very well today. He has been taking his biktarvy as prescribed, as is evidenced by his low VL. His lip lesion has healed significantly since our last visit, though there is an additional lesion. It appears shallow and with his improving VL and presumably CD4, I am inclined to continue to monitor his progress without additional intervention at this time. If the lesions have not significantly changed by his next visit, will revisit biopsy. My concern for malignancy is very low given the appearance, though an atypical infection is certainly on the differential given his very low CD4 at presentation. Plan to continue his biktarvy and bactrim as prescribed until we see each other again in 2 months for follow up.     Recommendations:  1. Continue biktarvy  2. Continue bactrim  3. Repeat CD4, HIV VL, quantiferon gold prior to next visit  4. If CD4 >200 at next visit, will start vaccines as below (would prioritize HepB)   5. Continue to monitor lip ulcers, if worse or unchanged at next visit, will reach out again to ENT for bx and culture.   6. Keep follow up appointment with Colorectal surgery  7. Dermatology referral for penile warts    Health care maintenance:  Vaccines due: HepB, Tdap, MenACWY, finish HPV series, shingrix, PCV20  Screening due: Quant gold    I spent a total of 61 minutes on the day of the visit.   Time spent by me doing chart review, history and exam, documentation and further activities per the note    Madie Turner MD.   Pager [phone]  Infectious Diseases         Interval History:   -Last seen: 2/24/23  -Current HIV regimen: Biktarvy     -How many missed doses in 4 weeks: None  -Since the last visit: Mr. Headley states he is doing very well. He is adjusting to the new diagnosis and has had an easy transition to taking his biktarvy daily. He uses a pill box, and is careful to remember to take it (and the bactrim) in the mornings prior to work. He doesn't think he has missed any doses. He has not noticed any side effects either. He also denies cough, SOB, GI symptoms, or new rashes.     For his lip ulcers, he saw ENT on 3/14/23. The provider thought it might be a fungal infection and recommended gentian violet topical application. No biopsy or cultures were performed. Mr. Headley initially used the gentian violet, but it ended up being very messy and staining things, so he has since stopped. The lesion we noted at his last visit has shrunk in size, but another lesion slightly posterior has increased in size. Neither of them are particularly painful, but are bothersome when he eats.     Mr. Headley has follow up scheduled in May with colorectal surgery for his anal warts. He is interested in seeing someone about the penile lesions as well.     Review of Systems: Remaining systems all reviewed and negative.    Past Medical History:  Condylomata acuminatum    HIV:  -diagnosis: 2016  -previous ART regimens: none  -resistance: unknown, panel pending    A. Vaccination/Serology status.   -Hepatitis A: Immune  -Hepatitis B: Nonimmune, nonexposed  -Strep pneumo: due (will wait until CD4 >200)  -TDaP: last 2012, due  -Zoster: due (will wait until CD4 >200)  -Meningococcal: due (previously had menomune)  -Influenza   -COVID: up to date  -Mpox: up to date     B. Routine Infectious Disease screening.   -Hepatitis C: negative (2023)   -Toxo IgG: negative (2023)  -CMV IGG: negative (2023)   -TB screen: due  -STD sceen: GC/CT: negative; RPR negative (2023);   -HLA-B*57-01:  negative  -Resistance: pan-S (2/24/23)     C. Cardiovascular, renal and bone health.   -Cholesterol: wnl (2023)  -Blood pressure: 139/82     D. Cancer screening (if applicable)  -Pap smear: +HPV condyloma, awaiting colorectal surgery evaluation    Medications:  Current Outpatient Medications   Medication Sig Dispense Refill     bictegravir-emtricitabine-tenofovir (BIKTARVY) -25 MG per tablet Take 1 tablet by mouth daily 30 tablet 0     sulfamethoxazole-trimethoprim (BACTRIM DS) 800-160 MG tablet Take 1 tablet by mouth daily before breakfast 30 tablet 0       No Known Allergies    Immunizations:  Immunization History   Administered Date(s) Administered     COVID-19 Vaccine 18+ (Moderna) 04/15/2021, 05/13/2021, 12/01/2021     COVID-19 Vaccine Bivalent Booster 18+ (Moderna) 12/17/2022     DTAP (<7y) 05/03/1995     HEPA 08/22/2008, 10/29/2012     HIB (PRP-T) 03/22/1991, 05/17/1991, 09/16/1991     HPV9 02/16/2023     HepB 09/10/1999, 11/01/1999, 05/01/2000     Influenza Vaccine >6 months (Alfuria,Fluzone) 12/17/2022     MMR 09/10/1999     Meningococcal (Menomune ) 08/22/2008     OPV, trivalent, live 05/03/1995     Smallpox/Mpox Vaccine Subcutaneous (JYNNEOS) 08/19/2022, 09/28/2022     TD,PF 7+ (Tenivac) 06/17/2002     TDAP Vaccine (Adacel) 10/29/2012       Exam:  B/P: Data Unavailable, T: Data Unavailable, P: Data Unavailable, R: Data Unavailable, Weight:   Wt Readings from Last 2 Encounters:   03/14/23 77.6 kg (171 lb)   02/24/23 77.6 kg (171 lb)       Physical Examination:  GENERAL:  well-appearing. no acute distress.  HEAD:  Head is normocephalic, atraumatic   EYES:  Eyes have anicteric sclerae without conjunctival injection. Pupils reactive bilaterally.   ENT:  Oropharynx is moist. Shallow ulceration w/ no central discoloration on L lip, smaller than prior visit. Posterior to that, another small ulceration is noted, appears shallow, no significant tenderness. Tongue is midline. No sinus tenderness.   NECK:   Supple. No cervical lymphadenopathy  LUNGS:  Normal WOB on RA.   NEUROLOGIC:  Grossly nonfocal. Active x4 extremities. Alert. Oriented.          Laboratory Data:     Cd4 Total Cd4%   CD4% Astor T Cells   Date Value Ref Range Status   02/16/2023 16 (L) 28 - 63 % Final   ( Absolute CD4, Astor T Cells   Date Value Ref Range Status   02/16/2023 112 (L) 441 - 2,156 cells/uL Final   (       HIV-1 RNA Quantitative:  HIV-1 RNA Copies/mL, Instrument   Date Value Ref Range Status   02/24/2023 28,938 (H) <1 copies/mL Final   02/16/2023 23,336 (H) <1 copies/mL Final     HIV-1 RNA copies/mL   Date Value Ref Range Status   03/25/2023 <20 (A) Not Detected Copies/mL Final   (    Metabolic Studies       Recent Labs   Lab Test 02/24/23  1029 02/16/23  1119    139   POTASSIUM 4.4 3.8   CHLORIDE 100 100   CO2 28 24   ANIONGAP 11 15   BUN 8.8 8.5   CR 0.57* 0.58*   GFRESTIMATED >90 >90   GLC 96 85   DENISHA 10.0 10.1*       Hepatic Studies    Recent Labs   Lab Test 02/24/23  1029 02/16/23  1119   BILITOTAL 0.5 0.6   ALKPHOS 136* 152*   PROTTOTAL 8.7* 8.7*   ALBUMIN 4.8 4.7   AST 48 61*   ALT 78* 107*       Hematology Studies      Recent Labs   Lab Test 02/24/23  1029 02/16/23  1119   WBC 3.5* 3.3*   HGB 15.4 14.5   HCT 44.4 42.1    202       Imaging:  No results found for this or any previous visit (from the past 48 hour(s)).

## 2023-03-31 ENCOUNTER — OFFICE VISIT (OUTPATIENT)
Dept: INFECTIOUS DISEASES | Facility: CLINIC | Age: 33
End: 2023-03-31
Attending: INTERNAL MEDICINE
Payer: COMMERCIAL

## 2023-03-31 VITALS
HEART RATE: 103 BPM | SYSTOLIC BLOOD PRESSURE: 139 MMHG | OXYGEN SATURATION: 96 % | BODY MASS INDEX: 22.38 KG/M2 | TEMPERATURE: 98.3 F | DIASTOLIC BLOOD PRESSURE: 82 MMHG | WEIGHT: 165 LBS

## 2023-03-31 DIAGNOSIS — B20 AIDS (ACQUIRED IMMUNODEFICIENCY SYNDROME), CD4 <=200 (H): Primary | ICD-10-CM

## 2023-03-31 PROCEDURE — 99215 OFFICE O/P EST HI 40 MIN: CPT | Performed by: INTERNAL MEDICINE

## 2023-03-31 PROCEDURE — 99417 PROLNG OP E/M EACH 15 MIN: CPT | Performed by: INTERNAL MEDICINE

## 2023-03-31 PROCEDURE — 99212 OFFICE O/P EST SF 10 MIN: CPT | Performed by: INTERNAL MEDICINE

## 2023-03-31 ASSESSMENT — PAIN SCALES - GENERAL: PAINLEVEL: NO PAIN (0)

## 2023-03-31 NOTE — LETTER
3/31/2023       RE: Kamron Headley  [address]  [facility] [address]     Dear Colleague,    Thank you for referring your patient, Kamron Headley, to the [facility] INFECTIOUS DISEASE CLINIC [place] at [facility]. Please see a copy of my visit note below.    [facility]  General Infectious Diseases/HIV Progress Note     Patient:  Kamron Headley, Date of birth 1990  Medical record number [id number]  Date of Visit:  03/29/2023    Assessment and Plan:   1. HIV/AIDS, CD4 112/16%  2. L lip ulcers, slightly improved  3. Mild transaminitis  4. H/o HPV+ condyloma acuminatum (resected 2013)  5. Penile warts  5. Hepatitis B nonimmune, nonexposed    Mr. Headley is doing very well today. He has been taking his biktarvy as prescribed, as is evidenced by his low VL. His lip lesion has healed significantly since our last visit, though there is an additional lesion. It appears shallow and with his improving VL and presumably CD4, I am inclined to continue to monitor his progress without additional intervention at this time. If the lesions have not significantly changed by his next visit, will revisit biopsy. My concern for malignancy is very low given the appearance, though an atypical infection is certainly on the differential given his very low CD4 at presentation. Plan to continue his biktarvy and bactrim as prescribed until we see each other again in 2 months for follow up.     Recommendations:  1. Continue biktarvy  2. Continue bactrim  3. Repeat CD4, HIV VL, quantiferon gold prior to next visit  4. If CD4 >200 at next visit, will start vaccines as below (would prioritize HepB)   5. Continue to monitor lip ulcers, if worse or unchanged at next visit, will reach out again to ENT for bx and culture.   6. Keep follow up appointment with Colorectal surgery  7. Dermatology referral for penile warts    Health care maintenance:  Vaccines  due: HepB, Tdap, MenACWY, finish HPV series, shingrix, PCV20  Screening due: Quant gold    I spent a total of 61 minutes on the day of the visit.   Time spent by me doing chart review, history and exam, documentation and further activities per the note    Madie Turner MD.   Pager [phone]  Infectious Diseases        Interval History:   -Last seen: 2/24/23  -Current HIV regimen: Biktarvy     -How many missed doses in 4 weeks: None  -Since the last visit: Mr. Headley states he is doing very well. He is adjusting to the new diagnosis and has had an easy transition to taking his biktarvy daily. He uses a pill box, and is careful to remember to take it (and the bactrim) in the mornings prior to work. He doesn't think he has missed any doses. He has not noticed any side effects either. He also denies cough, SOB, GI symptoms, or new rashes.     For his lip ulcers, he saw ENT on 3/14/23. The provider thought it might be a fungal infection and recommended gentian violet topical application. No biopsy or cultures were performed. Mr. Headley initially used the gentian violet, but it ended up being very messy and staining things, so he has since stopped. The lesion we noted at his last visit has shrunk in size, but another lesion slightly posterior has increased in size. Neither of them are particularly painful, but are bothersome when he eats.     Mr. Headley has follow up scheduled in May with colorectal surgery for his anal warts. He is interested in seeing someone about the penile lesions as well.     Review of Systems: Remaining systems all reviewed and negative.    Past Medical History:  Condylomata acuminatum    HIV:  -diagnosis: 2016  -previous ART regimens: none  -resistance: unknown, panel pending    A. Vaccination/Serology status.   -Hepatitis A: Immune  -Hepatitis B: Nonimmune, nonexposed  -Strep pneumo: due (will wait until CD4 >200)  -TDaP: last 2012, due  -Zoster: due (will wait until CD4  >200)  -Meningococcal: due (previously had menomune)  -Influenza   -COVID: up to date  -Mpox: up to date     B. Routine Infectious Disease screening.   -Hepatitis C: negative (2023)   -Toxo IgG: negative (2023)  -CMV IGG: negative (2023)   -TB screen: due  -STD sceen: GC/CT: negative; RPR negative (2023);   -HLA-B*57-01: negative  -Resistance: pan-S (2/24/23)     C. Cardiovascular, renal and bone health.   -Cholesterol: wnl (2023)  -Blood pressure: 139/82     D. Cancer screening (if applicable)  -Pap smear: +HPV condyloma, awaiting colorectal surgery evaluation    Medications:  Current Outpatient Medications   Medication Sig Dispense Refill    bictegravir-emtricitabine-tenofovir (BIKTARVY) -25 MG per tablet Take 1 tablet by mouth daily 30 tablet 0    sulfamethoxazole-trimethoprim (BACTRIM DS) 800-160 MG tablet Take 1 tablet by mouth daily before breakfast 30 tablet 0       No Known Allergies    Immunizations:  Immunization History   Administered Date(s) Administered    COVID-19 Vaccine 18+ (Moderna) 04/15/2021, 05/13/2021, 12/01/2021    COVID-19 Vaccine Bivalent Booster 18+ (Moderna) 12/17/2022    DTAP (<7y) 05/03/1995    HEPA 08/22/2008, 10/29/2012    HIB (PRP-T) 03/22/1991, 05/17/1991, 09/16/1991    HPV9 02/16/2023    HepB 09/10/1999, 11/01/1999, 05/01/2000    Influenza Vaccine >6 months (Alfuria,Fluzone) 12/17/2022    MMR 09/10/1999    Meningococcal (Menomune ) 08/22/2008    OPV, trivalent, live 05/03/1995    Smallpox/Mpox Vaccine Subcutaneous (JYNNEOS) 08/19/2022, 09/28/2022    TD,PF 7+ (Tenivac) 06/17/2002    TDAP Vaccine (Adacel) 10/29/2012       Exam:  B/P: Data Unavailable, T: Data Unavailable, P: Data Unavailable, R: Data Unavailable, Weight:   Wt Readings from Last 2 Encounters:   03/14/23 77.6 kg (171 lb)   02/24/23 77.6 kg (171 lb)       Physical Examination:  GENERAL:  well-appearing. no acute distress.  HEAD:  Head is normocephalic, atraumatic   EYES:  Eyes have anicteric sclerae without  conjunctival injection. Pupils reactive bilaterally.   ENT:  Oropharynx is moist. Shallow ulceration w/ no central discoloration on L lip, smaller than prior visit. Posterior to that, another small ulceration is noted, appears shallow, no significant tenderness. Tongue is midline. No sinus tenderness.   NECK:  Supple. No cervical lymphadenopathy  LUNGS:  Normal WOB on RA.   NEUROLOGIC:  Grossly nonfocal. Active x4 extremities. Alert. Oriented.          Laboratory Data:     Cd4 Total Cd4%   CD4% Central T Cells   Date Value Ref Range Status   02/16/2023 16 (L) 28 - 63 % Final   ( Absolute CD4, Central T Cells   Date Value Ref Range Status   02/16/2023 112 (L) 441 - 2,156 cells/uL Final   (       HIV-1 RNA Quantitative:  HIV-1 RNA Copies/mL, Instrument   Date Value Ref Range Status   02/24/2023 28,938 (H) <1 copies/mL Final   02/16/2023 23,336 (H) <1 copies/mL Final     HIV-1 RNA copies/mL   Date Value Ref Range Status   03/25/2023 <20 (A) Not Detected Copies/mL Final   (    Metabolic Studies       Recent Labs   Lab Test 02/24/23  1029 02/16/23  1119    139   POTASSIUM 4.4 3.8   CHLORIDE 100 100   CO2 28 24   ANIONGAP 11 15   BUN 8.8 8.5   CR 0.57* 0.58*   GFRESTIMATED >90 >90   GLC 96 85   DENISHA 10.0 10.1*       Hepatic Studies    Recent Labs   Lab Test 02/24/23  1029 02/16/23  1119   BILITOTAL 0.5 0.6   ALKPHOS 136* 152*   PROTTOTAL 8.7* 8.7*   ALBUMIN 4.8 4.7   AST 48 61*   ALT 78* 107*       Hematology Studies      Recent Labs   Lab Test 02/24/23  1029 02/16/23  1119   WBC 3.5* 3.3*   HGB 15.4 14.5   HCT 44.4 42.1    202       Imaging:  No results found for this or any previous visit (from the past 48 hour(s)).      Again, thank you for allowing me to participate in the care of your patient.      Sincerely,    Madie Turner MD

## 2023-03-31 NOTE — NURSING NOTE
Chief Complaint   Patient presents with     RECHECK     Return visit.     Blood pressure 139/82, pulse 103, temperature 98.3  F (36.8  C), weight 74.8 kg (165 lb), SpO2 96 %.    MARY ANN SANTOYO

## 2023-04-14 DIAGNOSIS — K12.0 ORAL APHTHOUS ULCER: Primary | ICD-10-CM

## 2023-04-14 RX ORDER — FLUCONAZOLE 200 MG/1
200 TABLET ORAL DAILY
Qty: 30 TABLET | Refills: 1 | Status: SHIPPED | OUTPATIENT
Start: 2023-04-14 | End: 2023-05-14

## 2023-04-27 ENCOUNTER — OFFICE VISIT (OUTPATIENT)
Dept: DERMATOLOGY | Facility: CLINIC | Age: 33
End: 2023-04-27
Payer: COMMERCIAL

## 2023-04-27 DIAGNOSIS — A63.0 CONDYLOMA: Primary | ICD-10-CM

## 2023-04-27 DIAGNOSIS — D49.2 NEOPLASM OF UNSPECIFIED BEHAVIOR OF BONE, SOFT TISSUE, AND SKIN: ICD-10-CM

## 2023-04-27 LAB
GRAM STAIN RESULT: NORMAL
GRAM STAIN RESULT: NORMAL

## 2023-04-27 PROCEDURE — 99000 SPECIMEN HANDLING OFFICE-LAB: CPT | Performed by: PATHOLOGY

## 2023-04-27 PROCEDURE — 54056 CRYOSURGERY PENIS LESION(S): CPT | Mod: GC | Performed by: STUDENT IN AN ORGANIZED HEALTH CARE EDUCATION/TRAINING PROGRAM

## 2023-04-27 PROCEDURE — 87205 SMEAR GRAM STAIN: CPT | Performed by: PATHOLOGY

## 2023-04-27 PROCEDURE — 87102 FUNGUS ISOLATION CULTURE: CPT | Performed by: PATHOLOGY

## 2023-04-27 PROCEDURE — 87116 MYCOBACTERIA CULTURE: CPT | Mod: 90 | Performed by: PATHOLOGY

## 2023-04-27 PROCEDURE — 88341 IMHCHEM/IMCYTCHM EA ADD ANTB: CPT | Mod: 26 | Performed by: DERMATOLOGY

## 2023-04-27 PROCEDURE — 88341 IMHCHEM/IMCYTCHM EA ADD ANTB: CPT | Mod: TC | Performed by: STUDENT IN AN ORGANIZED HEALTH CARE EDUCATION/TRAINING PROGRAM

## 2023-04-27 PROCEDURE — 87070 CULTURE OTHR SPECIMN AEROBIC: CPT | Performed by: PATHOLOGY

## 2023-04-27 PROCEDURE — 88305 TISSUE EXAM BY PATHOLOGIST: CPT | Mod: 26 | Performed by: DERMATOLOGY

## 2023-04-27 PROCEDURE — 88342 IMHCHEM/IMCYTCHM 1ST ANTB: CPT | Mod: 26 | Performed by: DERMATOLOGY

## 2023-04-27 PROCEDURE — 87176 TISSUE HOMOGENIZATION CULTR: CPT | Performed by: PATHOLOGY

## 2023-04-27 PROCEDURE — 11102 TANGNTL BX SKIN SINGLE LES: CPT | Mod: GC | Performed by: STUDENT IN AN ORGANIZED HEALTH CARE EDUCATION/TRAINING PROGRAM

## 2023-04-27 PROCEDURE — 87206 SMEAR FLUORESCENT/ACID STAI: CPT | Mod: 90 | Performed by: PATHOLOGY

## 2023-04-27 PROCEDURE — 88312 SPECIAL STAINS GROUP 1: CPT | Mod: 26 | Performed by: DERMATOLOGY

## 2023-04-27 PROCEDURE — 99204 OFFICE O/P NEW MOD 45 MIN: CPT | Mod: 25 | Performed by: STUDENT IN AN ORGANIZED HEALTH CARE EDUCATION/TRAINING PROGRAM

## 2023-04-27 RX ORDER — IMIQUIMOD 12.5 MG/.25G
CREAM TOPICAL
Qty: 12 PACKET | Refills: 3 | Status: SHIPPED | OUTPATIENT
Start: 2023-04-27 | End: 2023-10-25

## 2023-04-27 RX ORDER — LIDOCAINE HYDROCHLORIDE AND EPINEPHRINE 10; 10 MG/ML; UG/ML
3 INJECTION, SOLUTION INFILTRATION; PERINEURAL ONCE
Status: ACTIVE | OUTPATIENT
Start: 2023-04-27

## 2023-04-27 ASSESSMENT — PAIN SCALES - GENERAL: PAINLEVEL: NO PAIN (0)

## 2023-04-27 NOTE — PROGRESS NOTES
[facility] Dermatology Note   Encounter Date: Apr 27, 2023  Office visit    Dermatology Problem List:    # NUB  - L cheek, s/p shave bx and cx 4/27/23  # Condylomata  - s/p LN2 to penile; colorectal managing perianal  - Current Tx: aldara  # H/o HIV  - Most recent CD4 112 (2/16/23) and VL <20 (3/25/23)    ___________________________________________    Assessment & Plan:    # NUB - L cheek  DDx: viral induced ulcer vs major aphthous ulcer vs fungal infection vs other infectious etiology in the setting of AIDS patients. Agreeable to shave bx for cultures and H&E  - See procedure note below  - Photodocumentation obtained in clinic    # Condylomata - penis (x3)  Discussed the possible etiologies, clinical course, and management options of this condition with the patient. Will perform cryotherapy on the areas above. Will also provide aldara to help treat spots.  - See procedure note below  - Prescribed aldara every other day for 16 weeks   - Instructed patient to not start regimen until colorectal surgery eval/discussion     Procedures Performed:  Cryotherapy procedure note  - location: penis  - number of lesions treated: 3  After verbal consent and discussion of risks and benefits including but no limited to dyspigmentation/scar, blister, and pain, lesions treated with 1-2mm freeze border for 2 cycles with liquid nitrogen, post cryotherapy instructions provided    Shave biopsy procedure note (H&E and cultures)  - location(s): L cheek  After discussion of benefits and risks including but not limited to bleeding/bruising, pain/swelling, infection, scar, incomplete removal, nerve damage/numbness, recurrence, and non-diagnostic biopsy, written consent, verbal consent and photographs obtained, time-out performed, area cleaned with alcohol, 1% lidocaine injected to obtain anesthesia, shave biopsy performed, hemostasis achieved with cautery, Vaseline and bandage applied, post-care instructions  provided    Follow-up: 3m (warts)    Staff Involved:  Patient was seen and staffed with attending physician Dr. Mar Leung MD  Med/Derm Resident PGY-5  P:[phone]    ___________________________________________      CC: Derm Problem (Kamron is here today for warts on the penis )      HPI:  Mr. Kamron Headley is a(n) 32 year old male who presents to clinic today for condyloma management. Reports:  - been present for several years  - has three active ones on penile area  - has more on anal region but seeing colorectal soon for management  - also notes an ulcer on his left cheek  - been present for at least 6 months  - has slowly increased in size  - denies any pain or bleeding  - otherwise feeling well in usual state of health    Physical exam:  General: in no acute distress, well-developed, well-nourished  Skin:  - skin type: fair  - three verrucous papules on the penis (two at base and one on shaft)  - ulcerated plaque on L cheek with overlying gray debris  - No other lesions of concern on areas examined.         Medications:  Current Outpatient Medications   Medication    bictegravir-emtricitabine-tenofovir (BIKTARVY) -25 MG per tablet    fluconazole (DIFLUCAN) 200 MG tablet    sulfamethoxazole-trimethoprim (BACTRIM DS) 800-160 MG tablet     No current facility-administered medications for this visit.      Past Medical History:   Patient Active Problem List   Diagnosis    CARDIOVASCULAR SCREENING; LDL GOAL LESS THAN 160    Tinea corporis    Skin rash    Elevated LFTs    Anal skin tag    High risk HPV infection    Anal warts    HIV-1 (human immunodeficiency virus I) (H)    Oral aphthous ulcer    Borderline personality disorder (H)    Personality disorder (H)    Chronic hepatitis C without hepatic coma (H)    AIDS (acquired immunodeficiency syndrome), CD4 <=200 (H)     Past Medical History:   Diagnosis Date    NO ACTIVE PROBLEMS        CC No referring provider defined for this encounter. on  close of this encounter.

## 2023-04-27 NOTE — NURSING NOTE
Lidocaine-epinephrine 1-1:241628 % injection   1.5mL once for one use, starting 4/27/2023 ending 4/27/2023,  2mL disp, R-0, injection  Injected by Dr. Leung

## 2023-04-27 NOTE — PATIENT INSTRUCTIONS
Wound Care After a Biopsy    What is a skin biopsy?  A skin biopsy allows the doctor to examine a very small piece of tissue under the microscope to determine the diagnosis and the best treatment for the skin condition. A local anesthetic (numbing medicine)  is injected with a very small needle into the skin area to be tested. A small piece of skin is taken from the area. Sometimes a suture (stitch) is used.     What are the risks of a skin biopsy?  I will experience scar, bleeding, swelling, pain, crusting and redness. I may experience incomplete removal or recurrence. Risks of this procedure are excessive bleeding, bruising, infection, nerve damage, numbness, thick (hypertrophic or keloidal) scar and non-diagnostic biopsy.    How should I care for my wound for the first 24 hours?  Keep the wound dry and covered for 24 hours  If it bleeds, hold direct pressure on the area for 15 minutes. If bleeding does not stop then go to the emergency room  Avoid strenuous exercise the first 1-2 days or as your doctor instructs you    How should I care for the wound after 24 hours?  After 24 hours, remove the bandage  You may bathe or shower as normal  If you had a scalp biopsy, you can shampoo as usual and can use shower water to clean the biopsy site daily  Clean the wound twice a day with gentle soap and water  Do not scrub, be gentle  Apply white petroleum/Vaseline after cleaning the wound with a cotton swab or a clean finger, and keep the site covered with a Bandaid /bandage. Bandages are not necessary with a scalp biopsy  If you are unable to cover the site with a Bandaid /bandage, re-apply ointment 2-3 times a day to keep the site moist. Moisture will help with healing  Avoid strenuous activity for first 1-2 days  Avoid lakes, rivers, pools, and oceans until the stitches are removed or the site is healed    How do I clean my wound?  Wash hands thoroughly with soap or use hand  before all wound care  Clean the  wound with gentle soap and water  Apply white petroleum/Vaseline  to wound after it is clean  Replace the Bandaid /bandage to keep the wound covered for the first few days or as instructed by your doctor  If you had a scalp biopsy, warm shower water to the area on a daily basis should suffice    What should I use to clean my wound?   Cotton-tipped applicators (Qtips )  White petroleum jelly (Vaseline ). Use a clean new container and use Q-tips to apply.  Bandaids   as needed  Gentle soap     How should I care for my wound long term?  Do not get your wound dirty  Keep up with wound care for one week or until the area is healed.  A small scab will form and fall off by itself when the area is completely healed. The area will be red and will become pink in color as it heals. Sun protection is very important for how your scar will turn out. Sunscreen with an SPF 30 or greater is recommended once the area is healed.  You should have some soreness but it should be mild and slowly go away over several days. Talk to your doctor about using tylenol for pain,    When should I call my doctor?  If you have increased:   Pain or swelling  Pus or drainage (clear or slightly yellow drainage is ok)  Temperature over 100F  Spreading redness or warmth around wound    When will I hear about my results?  The biopsy results can take 2 weeks to come back.  Your results will automatically release to [employer] before your provider has even reviewed them.  The clinic will call you with the results, send you a [employer] message, or have you schedule a follow-up clinic or phone time to discuss the results.  Contact our clinics if you do not hear from us in 2 weeks.    Who should I call with questions?  [facility]: [phone]  [facility]: [phone]  For urgent needs outside of business hours call the [facility] at [phone] and ask for the dermatology resident on call      Cryotherapy    What is it?  Use of a very cold liquid, such as liquid nitrogen, to freeze and destroy abnormal skin cells that need to be removed    What should I expect?  Tenderness and redness  A small blister that might grow and fill with dark purple blood. There may be crusting.  More than one treatment may be needed if the lesions do not go away.    How do I care for the treated area?  Gently wash the area with your hands when bathing.  Use a thin layer of Vaseline to help with healing. You may use a Band-Aid.   The area should heal within 7-10 days and may leave behind a pink or lighter color.   Do not use an antibiotic or Neosporin ointment.   You may take acetaminophen (Tylenol) for pain.     Call your doctor if you have:  Severe pain  Signs of infection (warmth, redness, cloudy yellow drainage, and or a bad smell)  Questions or concerns    Who should I call with questions?      [facility]: [phone]      [facility]: [phone]      For urgent needs outside of business hours call the [facility] at [phone] and ask for the dermatology resident on call

## 2023-04-27 NOTE — NURSING NOTE
Dermatology Rooming Note    Kamron Headley's goals for this visit include:   Chief Complaint   Patient presents with     Derm Problem     Kamron is here today for warts on the penis      Blas WALKER, SARTHAK

## 2023-04-27 NOTE — LETTER
4/27/2023       RE: Kamron Headley  [address]  [facility] [address]     Dear Colleague,    Thank you for referring your patient, Kamron Headley, to the [facility] DERMATOLOGY CLINIC [place] at [facility]. Please see a copy of my visit note below.    [facility] Dermatology Note   Encounter Date: Apr 27, 2023  Office visit    Dermatology Problem List:    # NUB  - L cheek, s/p shave bx and cx 4/27/23  # Condylomata  - s/p LN2 to penile; colorectal managing perianal  - Current Tx: aldara  # H/o HIV  - Most recent CD4 112 (2/16/23) and VL <20 (3/25/23)    ___________________________________________    Assessment & Plan:    # NUB - L cheek  DDx: viral induced ulcer vs major aphthous ulcer vs fungal infection vs other infectious etiology in the setting of AIDS patients. Agreeable to shave bx for cultures and H&E  - See procedure note below  - Photodocumentation obtained in clinic    # Condylomata - penis (x3)  Discussed the possible etiologies, clinical course, and management options of this condition with the patient. Will perform cryotherapy on the areas above. Will also provide aldara to help treat spots.  - See procedure note below  - Prescribed aldara every other day for 16 weeks   - Instructed patient to not start regimen until colorectal surgery eval/discussion     Procedures Performed:  Cryotherapy procedure note  - location: penis  - number of lesions treated: 3  After verbal consent and discussion of risks and benefits including but no limited to dyspigmentation/scar, blister, and pain, lesions treated with 1-2mm freeze border for 2 cycles with liquid nitrogen, post cryotherapy instructions provided    Shave biopsy procedure note (H&E and cultures)  - location(s): L cheek  After discussion of benefits and risks including but not limited to bleeding/bruising, pain/swelling, infection, scar, incomplete removal, nerve  damage/numbness, recurrence, and non-diagnostic biopsy, written consent, verbal consent and photographs obtained, time-out performed, area cleaned with alcohol, 1% lidocaine injected to obtain anesthesia, shave biopsy performed, hemostasis achieved with cautery, Vaseline and bandage applied, post-care instructions provided    Follow-up: 3m (warts)    Staff Involved:  Patient was seen and staffed with attending physician Dr. Mar Leung MD  Med/Derm Resident PGY-5  P:[phone]    ___________________________________________      CC: Derm Problem (Kamron is here today for warts on the penis )      HPI:  Mr. Kamron Headley is a(n) 32 year old male who presents to clinic today for condyloma management. Reports:  - been present for several years  - has three active ones on penile area  - has more on anal region but seeing colorectal soon for management  - also notes an ulcer on his left cheek  - been present for at least 6 months  - has slowly increased in size  - denies any pain or bleeding  - otherwise feeling well in usual state of health    Physical exam:  General: in no acute distress, well-developed, well-nourished  Skin:  - skin type: fair  - three verrucous papules on the penis (two at base and one on shaft)  - ulcerated plaque on L cheek with overlying gray debris  - No other lesions of concern on areas examined.         Medications:  Current Outpatient Medications   Medication    bictegravir-emtricitabine-tenofovir (BIKTARVY) -25 MG per tablet    fluconazole (DIFLUCAN) 200 MG tablet    sulfamethoxazole-trimethoprim (BACTRIM DS) 800-160 MG tablet     No current facility-administered medications for this visit.      Past Medical History:   Patient Active Problem List   Diagnosis    CARDIOVASCULAR SCREENING; LDL GOAL LESS THAN 160    Tinea corporis    Skin rash    Elevated LFTs    Anal skin tag    High risk HPV infection    Anal warts    HIV-1 (human immunodeficiency virus I) (H)    Oral  aphthous ulcer    Borderline personality disorder (H)    Personality disorder (H)    Chronic hepatitis C without hepatic coma (H)    AIDS (acquired immunodeficiency syndrome), CD4 <=200 (H)     Past Medical History:   Diagnosis Date    NO ACTIVE PROBLEMS        CC No referring provider defined for this encounter. on close of this encounter.    Attestation with edits by Star Manuel MD at 4/27/2023 11:18 AM:  I have personally examined this patient and agree with the resident doctor's documentation and plan of care. I have reviewed and amended the resident's note. The documentation accurately reflects my clinical observations, diagnoses, treatment and follow-up plans. I was present for key portions of the procedure(s).       Star Manuel MD  Dermatology Staff

## 2023-04-28 ENCOUNTER — MYC MEDICAL ADVICE (OUTPATIENT)
Dept: SURGERY | Facility: CLINIC | Age: 33
End: 2023-04-28
Payer: COMMERCIAL

## 2023-04-29 LAB — BACTERIA TISS BX CULT: NORMAL

## 2023-05-01 ENCOUNTER — PRE VISIT (OUTPATIENT)
Dept: SURGERY | Facility: CLINIC | Age: 33
End: 2023-05-01

## 2023-05-01 ENCOUNTER — OFFICE VISIT (OUTPATIENT)
Dept: SURGERY | Facility: CLINIC | Age: 33
End: 2023-05-01
Attending: FAMILY MEDICINE
Payer: COMMERCIAL

## 2023-05-01 VITALS
DIASTOLIC BLOOD PRESSURE: 84 MMHG | WEIGHT: 176.8 LBS | OXYGEN SATURATION: 97 % | BODY MASS INDEX: 23.95 KG/M2 | SYSTOLIC BLOOD PRESSURE: 130 MMHG | HEIGHT: 72 IN | HEART RATE: 78 BPM

## 2023-05-01 DIAGNOSIS — A63.0 ANAL WARTS: Primary | ICD-10-CM

## 2023-05-01 DIAGNOSIS — A63.0 ANAL WARTS: ICD-10-CM

## 2023-05-01 PROCEDURE — 99204 OFFICE O/P NEW MOD 45 MIN: CPT | Performed by: NURSE PRACTITIONER

## 2023-05-01 ASSESSMENT — PAIN SCALES - GENERAL: PAINLEVEL: NO PAIN (0)

## 2023-05-01 NOTE — LETTER
2023       RE: Kamron Headley  [address]  [facility] [address]       Dear Colleague,    Thank you for referring your patient, Kamron Headley, to the [facility] COLON AND RECTAL SURGERY CLINIC [place] [facility]. Please see a copy of my visit note below.    Colon and Rectal Surgery Consult Clinic Note    Date: 2023     Referring provider:  Tess Geller MD  [address]  [address]  [place] [address]     RE: Kamron Headley  : 1990  GILLIAN: 2023    Kamron Headley is a very pleasant 32 year old HIV positive male here for anal warts.    HPI:  Kamron reports that he has had longstanding anal arts. History of wart fulguration in 2013 with Dr. Lopez. He feels like the warts never resolved with this surgery or recurred very quickly. They can be painful and he sometimes has difficulty having bowel movements because of them. No leakage of stool. He does not smoke. No anal receptive intercourse since . He follows with Dr. Turner of ID for his HIV and is working on getting better control of his but counts have been quite low. He has been treated by dermatology with freezing of some penile warts and topical Aldara was recommended but he is hesitant to do this given his low counts and side effects of Aldara.    T Cell Subset:  Absolute CD4, [place] T Cells   Date Value Ref Range Status   2023 112 (L) 441 - 2,156 cells/uL Final     CD4% [place] T Cells   Date Value Ref Range Status   2023 16 (L) 28 - 63 % Final     CD8% Suppressor T Cells   Date Value Ref Range Status   2023 32 10 - 40 % Final     CD3% Total T Cells   Date Value Ref Range Status   2023 53 49 - 84 % Final     CD4:CD8 Ratio   Date Value Ref Range Status   2023 0.51 (L) 1.40 - 2.60 Final     WBC Count   Date Value Ref Range Status   2023 3.5 (L) 4.0 - 11.0 10e3/uL Final     % Lymphocytes   Date Value Ref Range Status    02/24/2023 16 % Final     Absolute CD3, Total T Cells   Date Value Ref Range Status   02/16/2023 365 (L) 603 - 2,990 cells/uL Final     Absolute CD8, Suppressor T Cells   Date Value Ref Range Status   02/16/2023 221 125 - 1,312 cells/uL Final       HIV-1 RNA Quantitative:  HIV-1 RNA copies/mL   Date Value Ref Range Status   03/25/2023 <20 (A) Not Detected Copies/mL Final     HIV-1 RNA Copies/mL, Instrument   Date Value Ref Range Status   02/24/2023 28,938 (H) <1 copies/mL Final     HIV-1 log   Date Value Ref Range Status   03/25/2023 <1.3  Final         Physical Examination: Exam was chaperoned by Lucio Aranda, EMT-P   /84 (BP Location: Left arm, Patient Position: Sitting, Cuff Size: Adult Regular)   Pulse 78   Ht 6'   Wt 176 lb 12.8 oz   SpO2 97%   BMI 23.98 kg/m    Perianal external examination:  circumferential anal condyloma. These are not particularly bulky and with somewhat narrow bases but fairly heavy burden of warts.  Digital rectal examination: Was performed.   Circumferential condyloma that are soft and movable.    Anoscopy: Was deferred in order not to cause further trauma    Assessment/Plan: 32 year old male with heavy burden of anal condyloma. These do not have a very wide base so no flap coverage soul be necessary. We did discuss risks of surgery including pain, bleeding, infection, and stenosis. Also discussed increased risk for anal cancer but findings on exam today. Discussed the recurrent nature of anal warts and HPV related disease especially in the setting of immunocompromise.  He is following with ID and will be getting labs rechecked at the end of the month. I discussed that we should follow him regularly and closely for wart recurrence and would like to obtain anal cytology in the absence of warts in the future. Kamron's questions were answered to his stated satisfaction and he is in agreement with this plan.    Medical history:  Past Medical History:   Diagnosis Date    NO ACTIVE  PROBLEMS        Surgical history:  Past Surgical History:   Procedure Laterality Date    EXAM UNDER ANESTHESIA, FULGURATE CONDYLOMA ANUS, COMBINED  5/30/2013    Procedure: COMBINED EXAM UNDER ANESTHESIA, FULGURATE CONDYLOMA ANUS;  Exam Under Anesthesia of Anus, Excision and Fulguration of Anal Condyloma;  Surgeon: Matias Lopez MD;  Location:  OR    NO HISTORY OF SURGERY         Problem list:    Patient Active Problem List    Diagnosis Date Noted    Chronic hepatitis C without hepatic coma (H) 02/17/2023     Priority: Medium    AIDS (acquired immunodeficiency syndrome), CD4 <=200 (H) 02/17/2023     Priority: Medium    Oral aphthous ulcer 02/16/2023     Priority: Medium    Borderline personality disorder (H) 02/16/2023     Priority: Medium    Personality disorder (H) 02/16/2023     Priority: Medium    HIV-1 (human immunodeficiency virus I) (H) 05/25/2016     Priority: Medium    High risk HPV infection 04/18/2013     Priority: Medium    Anal warts 04/18/2013     Priority: Medium    Anal skin tag 04/11/2013     Priority: Medium    Skin rash 02/11/2013     Priority: Medium    Elevated LFTs 02/11/2013     Priority: Medium    CARDIOVASCULAR SCREENING; LDL GOAL LESS THAN 160 10/29/2012     Priority: Medium    Tinea corporis 10/29/2012     Priority: Medium       Medications:  Current Outpatient Medications   Medication Sig Dispense Refill    bictegravir-emtricitabine-tenofovir (BIKTARVY) -25 MG per tablet Take 1 tablet by mouth daily 30 tablet 0    fluconazole (DIFLUCAN) 200 MG tablet Take 1 tablet (200 mg) by mouth daily for 30 days 30 tablet 1    imiquimod (ALDARA) 5 % external cream Apply a small sized amount to warts or molluscum three times weekly at bedtime.   Wash off after 8 hours.   May use for up to 16 weeks. 12 packet 3    sulfamethoxazole-trimethoprim (BACTRIM DS) 800-160 MG tablet Take 1 tablet by mouth daily before breakfast 30 tablet 0       Allergies:  No Known Allergies    Family  history:  Family History   Problem Relation Age of Onset    Cancer Paternal Grandmother     Family History Negative No family hx of         no skin disorders       Social history:  Social History     Tobacco Use    Smoking status: Never    Smokeless tobacco: Never   Vaping Use    Vaping status: Never Used   Substance Use Topics    Alcohol use: No    Marital status: single.    Nursing Notes:   Lucio Aranda, EMT  5/1/2023  2:55 PM  Signed  Chief Complaint   Patient presents with    New Patient     Warts       Vitals:    05/01/23 1453   BP: 130/84   BP Location: Left arm   Patient Position: Sitting   Cuff Size: Adult Regular   Pulse: 78   SpO2: 97%   Weight: 176 lb 12.8 oz   Height: 6'       Body mass index is 23.98 kg/m .     Lucio Aranda, SUBHA- P       45 minutes spent on the date of encounter performing chart review, history and exam, documentation and further activities as noted above.      This note was created using speech recognition software and may contain unintended word substitutions.       Again, thank you for allowing me to participate in the care of your patient.      Sincerely,    DONTE Lazo CNP

## 2023-05-01 NOTE — PROGRESS NOTES
Colon and Rectal Surgery Consult Clinic Note    Date: 2023     Referring provider:  Tess Geller MD  [address] [place] [address]     RE: Kamron Headley  : 1990  GILLIAN: 2023    Kamron Headley is a very pleasant 32 year old HIV positive male here for anal warts.    HPI:  Kamron reports that he has had longstanding anal arts. History of wart fulguration in 2013 with Dr. Lopez. He feels like the warts never resolved with this surgery or recurred very quickly. They can be painful and he sometimes has difficulty having bowel movements because of them. No leakage of stool. He does not smoke. No anal receptive intercourse since 2013. He follows with Dr. Turner of ID for his HIV and is working on getting better control of his but counts have been quite low. He has been treated by dermatology with freezing of some penile warts and topical Aldara was recommended but he is hesitant to do this given his low counts and side effects of Aldara.    T Cell Subset:  Absolute CD4, [place] T Cells   Date Value Ref Range Status   2023 112 (L) 441 - 2,156 cells/uL Final     CD4% [place] T Cells   Date Value Ref Range Status   2023 16 (L) 28 - 63 % Final     CD8% Suppressor T Cells   Date Value Ref Range Status   2023 32 10 - 40 % Final     CD3% Total T Cells   Date Value Ref Range Status   2023 53 49 - 84 % Final     CD4:CD8 Ratio   Date Value Ref Range Status   2023 0.51 (L) 1.40 - 2.60 Final     WBC Count   Date Value Ref Range Status   2023 3.5 (L) 4.0 - 11.0 10e3/uL Final     % Lymphocytes   Date Value Ref Range Status   2023 16 % Final     Absolute CD3, Total T Cells   Date Value Ref Range Status   2023 365 (L) 603 - 2,990 cells/uL Final     Absolute CD8, Suppressor T Cells   Date Value Ref Range Status   2023 221 125 - 1,312 cells/uL Final       HIV-1 RNA Quantitative:  HIV-1 RNA copies/mL   Date Value Ref Range Status    03/25/2023 <20 (A) Not Detected Copies/mL Final     HIV-1 RNA Copies/mL, Instrument   Date Value Ref Range Status   02/24/2023 28,938 (H) <1 copies/mL Final     HIV-1 log   Date Value Ref Range Status   03/25/2023 <1.3  Final         Physical Examination: Exam was chaperoned by Lucio Aranda, EMT-P   /84 (BP Location: Left arm, Patient Position: Sitting, Cuff Size: Adult Regular)   Pulse 78   Ht 6'   Wt 176 lb 12.8 oz   SpO2 97%   BMI 23.98 kg/m    Perianal external examination:  circumferential anal condyloma. These are not particularly bulky and with somewhat narrow bases but fairly heavy burden of warts.  Digital rectal examination: Was performed.   Circumferential condyloma that are soft and movable.    Anoscopy: Was deferred in order not to cause further trauma    Assessment/Plan: 32 year old male with heavy burden of anal condyloma. These do not have a very wide base so no flap coverage soul be necessary. We did discuss risks of surgery including pain, bleeding, infection, and stenosis. Also discussed increased risk for anal cancer but findings on exam today. Discussed the recurrent nature of anal warts and HPV related disease especially in the setting of immunocompromise.  He is following with ID and will be getting labs rechecked at the end of the month. I discussed that we should follow him regularly and closely for wart recurrence and would like to obtain anal cytology in the absence of warts in the future. Kamron's questions were answered to his stated satisfaction and he is in agreement with this plan.    Medical history:  Past Medical History:   Diagnosis Date     NO ACTIVE PROBLEMS        Surgical history:  Past Surgical History:   Procedure Laterality Date     EXAM UNDER ANESTHESIA, FULGURATE CONDYLOMA ANUS, COMBINED  5/30/2013    Procedure: COMBINED EXAM UNDER ANESTHESIA, FULGURATE CONDYLOMA ANUS;  Exam Under Anesthesia of Anus, Excision and Fulguration of Anal Condyloma;  Surgeon:  Matias Lopez MD;  Location:  OR     NO HISTORY OF SURGERY         Problem list:    Patient Active Problem List    Diagnosis Date Noted     Chronic hepatitis C without hepatic coma (H) 02/17/2023     Priority: Medium     AIDS (acquired immunodeficiency syndrome), CD4 <=200 (H) 02/17/2023     Priority: Medium     Oral aphthous ulcer 02/16/2023     Priority: Medium     Borderline personality disorder (H) 02/16/2023     Priority: Medium     Personality disorder (H) 02/16/2023     Priority: Medium     HIV-1 (human immunodeficiency virus I) (H) 05/25/2016     Priority: Medium     High risk HPV infection 04/18/2013     Priority: Medium     Anal warts 04/18/2013     Priority: Medium     Anal skin tag 04/11/2013     Priority: Medium     Skin rash 02/11/2013     Priority: Medium     Elevated LFTs 02/11/2013     Priority: Medium     CARDIOVASCULAR SCREENING; LDL GOAL LESS THAN 160 10/29/2012     Priority: Medium     Tinea corporis 10/29/2012     Priority: Medium       Medications:  Current Outpatient Medications   Medication Sig Dispense Refill     bictegravir-emtricitabine-tenofovir (BIKTARVY) -25 MG per tablet Take 1 tablet by mouth daily 30 tablet 0     fluconazole (DIFLUCAN) 200 MG tablet Take 1 tablet (200 mg) by mouth daily for 30 days 30 tablet 1     imiquimod (ALDARA) 5 % external cream Apply a small sized amount to warts or molluscum three times weekly at bedtime.   Wash off after 8 hours.   May use for up to 16 weeks. 12 packet 3     sulfamethoxazole-trimethoprim (BACTRIM DS) 800-160 MG tablet Take 1 tablet by mouth daily before breakfast 30 tablet 0       Allergies:  No Known Allergies    Family history:  Family History   Problem Relation Age of Onset     Cancer Paternal Grandmother      Family History Negative No family hx of         no skin disorders       Social history:  Social History     Tobacco Use     Smoking status: Never     Smokeless tobacco: Never   Vaping Use     Vaping status: Never  Used   Substance Use Topics     Alcohol use: No    Marital status: single.    Nursing Notes:   Lucio Aranda, EMT  5/1/2023  2:55 PM  Signed  Chief Complaint   Patient presents with     New Patient     Warts       Vitals:    05/01/23 1453   BP: 130/84   BP Location: Left arm   Patient Position: Sitting   Cuff Size: Adult Regular   Pulse: 78   SpO2: 97%   Weight: 176 lb 12.8 oz   Height: 6'       Body mass index is 23.98 kg/m .     Lucio Aranda, SUBHA- P         45 minutes spent on the date of encounter performing chart review, history and exam, documentation and further activities as noted above.    DONTE Henry, NP-C  Colon and Rectal Surgery   [facility]    This note was created using speech recognition software and may contain unintended word substitutions.

## 2023-05-01 NOTE — NURSING NOTE
Chief Complaint   Patient presents with     New Patient     Warts       Vitals:    05/01/23 1453   BP: 130/84   BP Location: Left arm   Patient Position: Sitting   Cuff Size: Adult Regular   Pulse: 78   SpO2: 97%   Weight: 176 lb 12.8 oz   Height: 6'       Body mass index is 23.98 kg/m .     Lucio Aranda, EMT- P

## 2023-05-05 NOTE — RESULT ENCOUNTER NOTE
Reviewed results showing normal justin on bacterial tissue culture. Discussed these results with patient over MyChart on 5/5/23. Awaiting pathology and fungal/AFB culture results.    Follow up pending results above    CC'ing Dr. Manuel as FYI only

## 2023-05-08 DIAGNOSIS — B20 AIDS (ACQUIRED IMMUNODEFICIENCY SYNDROME), CD4 <=200 (H): ICD-10-CM

## 2023-05-09 ENCOUNTER — TELEPHONE (OUTPATIENT)
Dept: SURGERY | Facility: CLINIC | Age: 33
End: 2023-05-09

## 2023-05-09 DIAGNOSIS — Z21 HIV-1 (HUMAN IMMUNODEFICIENCY VIRUS I) (H): ICD-10-CM

## 2023-05-09 DIAGNOSIS — B20 AIDS (ACQUIRED IMMUNODEFICIENCY SYNDROME), CD4 <=200 (H): ICD-10-CM

## 2023-05-09 RX ORDER — SULFAMETHOXAZOLE/TRIMETHOPRIM 800-160 MG
1 TABLET ORAL
Qty: 30 TABLET | Refills: 1 | Status: SHIPPED | OUTPATIENT
Start: 2023-05-09 | End: 2023-07-05

## 2023-05-09 NOTE — TELEPHONE ENCOUNTER
Spoke with patient about scheduling his outpatient procedure at the [facility], ordered under Dr. Mike Wolf. The Case Request says to schedule within a month, can be with Dr. Sal Bender if he is available, otherwise schedule under Dr. Mike Wolf.    Patient insists that he was told that this would be done by Dr. Sal Bender, and that is what he wants, and sooner than Dr. Sal Bender's next availability in August. I did explain that my instructions were to schedule with either surgeon, but he said that was incorrect.    Sent message to Bárbara Olivo NP and NISSA Apodaca, asking about this, and if there is a way for Dr. Bender to do this case sooner? (suggested possibly on 5/18 but that this would overlap meetings, and the [facility] OR may not allow us to add it - I would have to ask for permission). Awaiting response.    Additional Info: patient said ok to wait list but that he also wants a firm date, which may not be possible.    I told patient that either Bárbara Olivo NP or I would reach out to him as soon as clarification has been received.    Kizzy Waldron  Leanne-op Coordinator  [place]Rect[facility]  Direct Phone: [phone]

## 2023-05-18 LAB
PATH REPORT.COMMENTS IMP SPEC: NORMAL
PATH REPORT.FINAL DX SPEC: NORMAL
PATH REPORT.GROSS SPEC: NORMAL
PATH REPORT.MICROSCOPIC SPEC OTHER STN: NORMAL
PATH REPORT.RELEVANT HX SPEC: NORMAL

## 2023-05-21 DIAGNOSIS — K12.0 ULCER APHTHOUS ORAL: Primary | ICD-10-CM

## 2023-05-21 RX ORDER — CLOBETASOL PROPIONATE 0.5 MG/G
OINTMENT TOPICAL 2 TIMES DAILY
Qty: 30 G | Refills: 1 | Status: SHIPPED | OUTPATIENT
Start: 2023-05-21 | End: 2023-10-10

## 2023-05-21 NOTE — RESULT ENCOUNTER NOTE
"Reviewed results showing \"ulcer, granulation tissue and dense mixed inflammatory infiltrate\". Awaiting final fungal/AFB culture results. [facility] these results [facility]21[facility]23. Will plan to send prescription for clobetasol for topical application to help reduce inflammation. Still awaiting final fungal/AFB culture results    Follow up in 3m    CC'ing Dr. Ortega as FYI only    "

## 2023-05-25 DIAGNOSIS — Z21 HIV-1 (HUMAN IMMUNODEFICIENCY VIRUS I) (H): Primary | ICD-10-CM

## 2023-05-25 LAB — BACTERIA TISS BX CULT: NO GROWTH

## 2023-05-25 NOTE — PROGRESS NOTES
[facility]  General Infectious Diseases/HIV Progress Note     Patient:  Kamron Headley, Date of birth 1990  Medical record number [id number]  Date of Visit:  05/24/2023    Assessment and Plan:   1. HIV/AIDS, CD4 163/23%   - CD4 autumn 112/16%  2. L lip ulcers, slightly improved   - Chronic inflammation on path, cultures negative, organism staining negative  3. Mild transaminitis - resolving  4. H/o HPV+ condyloma acuminatum (resected 2013)  5. Penile warts  5. Hepatitis B nonimmune, nonexposed    Kamron presents to clinic today for a routine follow up visit. He is feeling very well aside from his persistent cheek ulcer. I am reassured by his recent biopsy with Dermatology that was negative for multiple infectious etiologies via staining and culture. AFB culture is still pending, but the pathology is not consistent with a mycobacterial organism so my suspicion is very low. I suspect the ulcers are simply aphthous that have taken a while to heal 2/2 immunosuppression vs directly HIV related. Either way, continuing ART and immune recovery will ultimately be the most helpful for healing. In the interim, it is not unreasonable to try a course of a topical steroid to see if this helps calm down the local inflammation. The systemic absorption of topical steroids is low and I anticipate this to be a short course of therapy so I am not concerned about compounding immunosuppression. I do think he needs continued follow up with Dermatology and repeat biopsy and cultures if it persists despite VL suppression and immune recovery.     We will start his Hep B series today, with an additional dose given his immune suppression. Plan for a screening quant gold today, and additional vaccines at next visit.     Recommendations:  1. Continue biktarvy  2. Continue bactrim until CD4 >200  3. Repeat CD4, HIV VL, quantiferon gold today  4. Hep B series started today, will need nursing visits at 1, 2, and 6  months to complete series  5. Discontinue fluconazole  6. Agree with a trial of topical steroids for lip ulcer per Dermatology,    Health care maintenance:  Vaccines due: HepB, Tdap, MenACWY, finish HPV series, shingrix, PCV20  Screening due: Quant gold    Follow up: 3 months, w/ labs    I spent a total of 31 minutes on the day of the visit.   Time spent by me doing chart review, history and exam, documentation and further activities per the note      Madie Turner MD.   Pager [phone]  Infectious Diseases        Interval History:   -Last seen: 3/31/23  -Current HIV regimen: Biktarvy      -How many missed doses in 4 weeks: None. Tolerating both bactrim and biktarvy well.   -Since the last visit:     Kamron was seen by Dermatology for penile warts and his lip ulcer. Cryotherapy was used on the warts and he was given topical aldara. He prefers not to use this while he is immunosuppressed so he hasn't been using it. He will be seeing Dermatology again and is hoping they will do more cryotherapy. The lip ulcer was biopsied and cultured. Pathology is consistent with chronic inflammation. Staining is negative for CMV, HSV, VZV, HHV8, Treponema, fungal, and mycobacterial organisms. AFB, bacterial, and fungal cultures are negative thus far (only AFB is still pending). Dermatology recommended a topical steroid and he has not yet started it as he wanted to discuss with me. He has been taking fluconazole as we had previously discussed and is wondering if he should stop.     Kamron was also seen by Colorectal Surgery regarding his anal warts. He would like his procedure to be performed by Dr. Bender, but has not be able to schedule with him due to limited availability. He is planning on discussing with the Colorectal team again today, if he cannot get in, he will let me know.     Kamron notes today that he overall feels well. He has no fevers, chills, chest pain, SOB, abdominal pain, or loose stools. His only  complaint is that his cheek ulcer is still present and bothersome. As above he hasn't yet started the steroid and is wondering if it will be immunosuppressive to do so. He doesn't think it is getting bigger, but it is sometimes painful to eat. He has a lot of life stressors at this time including his new HIV diagnosis, upcoming move, and stress at work.      Review of Systems: Remaining systems all reviewed and negative.    Past Medical History:  Condylomata acuminatum    HIV:  -diagnosis: 2016  -previous ART regimens: none  -resistance: none 2/24/23     A. Vaccination/Serology status.   -Hepatitis A: Immune  -Hepatitis B: Nonimmune, nonexposed  -Strep pneumo: due (will wait until CD4 >200)  -TDaP: last 2012, due  -Zoster: due (will wait until CD4 >200)  -Meningococcal: due (previously had menomune)  -Influenza   -COVID: up to date  -Mpox: up to date     B. Routine Infectious Disease screening.   -Hepatitis C: negative (2023)   -Toxo IgG: negative (2023)  -CMV IGG: negative (2023)   -TB screen: due  -STD sceen: GC/CT: negative; RPR negative (2023);   -HLA-B*57-01: negative  -Resistance: pan-S (2/24/23)     C. Cardiovascular, renal and bone health.   -Cholesterol: wnl (2023)  -Blood pressure: 119/76     D. Cancer screening (if applicable)  -Pap smear: +HPV condyloma, awaiting colorectal surgery evaluation    Medications:  Current Outpatient Medications   Medication Sig Dispense Refill     bictegravir-emtricitabine-tenofovir (BIKTARVY) -25 MG per tablet Take 1 tablet by mouth daily 30 tablet 1     clobetasol (TEMOVATE) 0.05 % external ointment Apply topically 2 times daily To ulcer as needed to help improve healing 30 g 1     imiquimod (ALDARA) 5 % external cream Apply a small sized amount to warts or molluscum three times weekly at bedtime.   Wash off after 8 hours.   May use for up to 16 weeks. 12 packet 3     sulfamethoxazole-trimethoprim (BACTRIM DS) 800-160 MG tablet Take 1 tablet by mouth daily before  breakfast 30 tablet 1       No Known Allergies    Immunizations:  Immunization History   Administered Date(s) Administered     COVID-19 Bivalent 18+ (Moderna) 12/17/2022     COVID-19 Monovalent 18+ (Moderna) 04/15/2021, 05/13/2021, 12/01/2021     DTAP (<7y) 05/03/1995     HEPA 08/22/2008, 10/29/2012     HIB (PRP-T) 03/22/1991, 05/17/1991, 09/16/1991     HPV9 02/16/2023     HepB 09/10/1999, 11/01/1999, 05/01/2000     Influenza Vaccine >6 months (Alfuria,Fluzone) 12/17/2022     MMR 09/10/1999     Meningococcal (Menomune ) 08/22/2008     OPV, trivalent, live 05/03/1995     Smallpox/Mpox Vaccine Subcutaneous (JYNNEOS) 08/19/2022, 09/28/2022     TD,PF 7+ (Tenivac) 06/17/2002     TDAP Vaccine (Adacel) 10/29/2012       Exam:  /76   Pulse 62   Temp 98.1  F (36.7  C) (Oral)   Ht 1.829 m (6')   Wt 76 kg (167 lb 8 oz)   BMI 22.72 kg/m    Wt Readings from Last 2 Encounters:   05/01/23 80.2 kg (176 lb 12.8 oz)   03/31/23 74.8 kg (165 lb)       Physical Examination:  GENERAL:  well-appearing. no acute distress.  HEAD:  Head is normocephalic, atraumatic   EYES:  Eyes have anicteric sclerae without conjunctival injection.   ENT: Persistent superficial ulceration of L buccal skin, no surrounding erythema or drainage. No additional lesions noted.   NECK:  Supple. No cervical lymphadenopathy  LUNGS: Normal WOB on RA.   SKIN:  No acute rashes.    EXTREMITIES: No joint erythema or swelling.   NEUROLOGIC:  Grossly nonfocal. Active x4 extremities. Alert. Oriented.          Laboratory Data:     Cd4 Total Cd4%   CD4% [place] T Cells   Date Value Ref Range Status   02/16/2023 16 (L) 28 - 63 % Final   ( Absolute CD4, [place] T Cells   Date Value Ref Range Status   02/16/2023 112 (L) 441 - 2,156 cells/uL Final   (       HIV-1 RNA Quantitative:  HIV-1 RNA Copies/mL, Instrument   Date Value Ref Range Status   02/24/2023 28,938 (H) <1 copies/mL Final   02/16/2023 23,336 (H) <1 copies/mL Final     HIV-1 RNA copies/mL   Date Value Ref  Range Status   03/25/2023 <20 (A) Not Detected Copies/mL Final   (    Metabolic Studies       Recent Labs   Lab Test 02/24/23  1029 02/16/23  1119    139   POTASSIUM 4.4 3.8   CHLORIDE 100 100   CO2 28 24   ANIONGAP 11 15   BUN 8.8 8.5   CR 0.57* 0.58*   GFRESTIMATED >90 >90   GLC 96 85   DENISHA 10.0 10.1*       Hepatic Studies    Recent Labs   Lab Test 02/24/23  1029 02/16/23  1119   BILITOTAL 0.5 0.6   ALKPHOS 136* 152*   PROTTOTAL 8.7* 8.7*   ALBUMIN 4.8 4.7   AST 48 61*   ALT 78* 107*       Hematology Studies      Recent Labs   Lab Test 02/24/23  1029 02/16/23  1119   WBC 3.5* 3.3*   HGB 15.4 14.5   HCT 44.4 42.1    202

## 2023-05-25 NOTE — RESULT ENCOUNTER NOTE
Reviewed results showing negative fungal culture. Discussed these results with patient over MyChart on 5/25/23.    CC'ing Dr. RAGHAV Manuel as FYI only

## 2023-05-26 ENCOUNTER — LAB (OUTPATIENT)
Dept: LAB | Facility: CLINIC | Age: 33
End: 2023-05-26
Payer: COMMERCIAL

## 2023-05-26 ENCOUNTER — OFFICE VISIT (OUTPATIENT)
Dept: INFECTIOUS DISEASES | Facility: CLINIC | Age: 33
End: 2023-05-26
Attending: INTERNAL MEDICINE
Payer: COMMERCIAL

## 2023-05-26 VITALS
DIASTOLIC BLOOD PRESSURE: 76 MMHG | SYSTOLIC BLOOD PRESSURE: 119 MMHG | TEMPERATURE: 98.1 F | BODY MASS INDEX: 22.69 KG/M2 | WEIGHT: 167.5 LBS | HEIGHT: 72 IN | HEART RATE: 62 BPM

## 2023-05-26 DIAGNOSIS — Z21 HIV-1 (HUMAN IMMUNODEFICIENCY VIRUS I) (H): ICD-10-CM

## 2023-05-26 DIAGNOSIS — B20 AIDS (ACQUIRED IMMUNODEFICIENCY SYNDROME), CD4 <=200 (H): Primary | ICD-10-CM

## 2023-05-26 LAB
ALBUMIN SERPL BCG-MCNC: 4.8 G/DL (ref 3.5–5.2)
ALP SERPL-CCNC: 114 U/L (ref 40–129)
ALT SERPL W P-5'-P-CCNC: 67 U/L (ref 10–50)
ANION GAP SERPL CALCULATED.3IONS-SCNC: 10 MMOL/L (ref 7–15)
AST SERPL W P-5'-P-CCNC: 38 U/L (ref 10–50)
BASOPHILS # BLD AUTO: 0 10E3/UL (ref 0–0.2)
BASOPHILS NFR BLD AUTO: 0 %
BILIRUB SERPL-MCNC: 0.5 MG/DL
BUN SERPL-MCNC: 6.7 MG/DL (ref 6–20)
CALCIUM SERPL-MCNC: 9.9 MG/DL (ref 8.6–10)
CD3 CELLS # BLD: 411 CELLS/UL (ref 603–2990)
CD3 CELLS NFR BLD: 59 % (ref 49–84)
CD3+CD4+ CELLS # BLD: 163 CELLS/UL (ref 441–2156)
CD3+CD4+ CELLS NFR BLD: 23 % (ref 28–63)
CD3+CD4+ CELLS/CD3+CD8+ CLL BLD: 0.71 % (ref 1.4–2.6)
CD3+CD8+ CELLS # BLD: 230 CELLS/UL (ref 125–1312)
CD3+CD8+ CELLS NFR BLD: 33 % (ref 10–40)
CHLORIDE SERPL-SCNC: 102 MMOL/L (ref 98–107)
CREAT SERPL-MCNC: 0.73 MG/DL (ref 0.67–1.17)
DEPRECATED HCO3 PLAS-SCNC: 29 MMOL/L (ref 22–29)
EOSINOPHIL # BLD AUTO: 0 10E3/UL (ref 0–0.7)
EOSINOPHIL NFR BLD AUTO: 1 %
ERYTHROCYTE [DISTWIDTH] IN BLOOD BY AUTOMATED COUNT: 13.9 % (ref 10–15)
GFR SERPL CREATININE-BSD FRML MDRD: >90 ML/MIN/1.73M2
GLUCOSE SERPL-MCNC: 89 MG/DL (ref 70–99)
HCT VFR BLD AUTO: 43.6 % (ref 40–53)
HGB BLD-MCNC: 15.1 G/DL (ref 13.3–17.7)
IMM GRANULOCYTES # BLD: 0 10E3/UL
IMM GRANULOCYTES NFR BLD: 0 %
LYMPHOCYTES # BLD AUTO: 0.7 10E3/UL (ref 0.8–5.3)
LYMPHOCYTES NFR BLD AUTO: 18 %
MCH RBC QN AUTO: 31.1 PG (ref 26.5–33)
MCHC RBC AUTO-ENTMCNC: 34.6 G/DL (ref 31.5–36.5)
MCV RBC AUTO: 90 FL (ref 78–100)
MONOCYTES # BLD AUTO: 0.3 10E3/UL (ref 0–1.3)
MONOCYTES NFR BLD AUTO: 6 %
NEUTROPHILS # BLD AUTO: 3 10E3/UL (ref 1.6–8.3)
NEUTROPHILS NFR BLD AUTO: 75 %
NRBC # BLD AUTO: 0 10E3/UL
NRBC BLD AUTO-RTO: 0 /100
PLATELET # BLD AUTO: 231 10E3/UL (ref 150–450)
POTASSIUM SERPL-SCNC: 4 MMOL/L (ref 3.4–5.3)
PROT SERPL-MCNC: 8.1 G/DL (ref 6.4–8.3)
RBC # BLD AUTO: 4.86 10E6/UL (ref 4.4–5.9)
SODIUM SERPL-SCNC: 141 MMOL/L (ref 136–145)
T CELL COMMENT: ABNORMAL
WBC # BLD AUTO: 4 10E3/UL (ref 4–11)

## 2023-05-26 PROCEDURE — 99000 SPECIMEN HANDLING OFFICE-LAB: CPT | Performed by: PATHOLOGY

## 2023-05-26 PROCEDURE — G0010 ADMIN HEPATITIS B VACCINE: HCPCS | Performed by: INTERNAL MEDICINE

## 2023-05-26 PROCEDURE — 250N000011 HC RX IP 250 OP 636: Performed by: INTERNAL MEDICINE

## 2023-05-26 PROCEDURE — 80053 COMPREHEN METABOLIC PANEL: CPT | Performed by: PATHOLOGY

## 2023-05-26 PROCEDURE — 99213 OFFICE O/P EST LOW 20 MIN: CPT | Mod: 25 | Performed by: INTERNAL MEDICINE

## 2023-05-26 PROCEDURE — 86360 T CELL ABSOLUTE COUNT/RATIO: CPT | Performed by: PATHOLOGY

## 2023-05-26 PROCEDURE — 85025 COMPLETE CBC W/AUTO DIFF WBC: CPT | Performed by: PATHOLOGY

## 2023-05-26 PROCEDURE — 99214 OFFICE O/P EST MOD 30 MIN: CPT | Performed by: INTERNAL MEDICINE

## 2023-05-26 PROCEDURE — 36415 COLL VENOUS BLD VENIPUNCTURE: CPT | Performed by: PATHOLOGY

## 2023-05-26 PROCEDURE — 87536 HIV-1 QUANT&REVRSE TRNSCRPJ: CPT | Performed by: PATHOLOGY

## 2023-05-26 PROCEDURE — 86481 TB AG RESPONSE T-CELL SUSP: CPT | Performed by: PATHOLOGY

## 2023-05-26 PROCEDURE — 90746 HEPB VACCINE 3 DOSE ADULT IM: CPT | Performed by: INTERNAL MEDICINE

## 2023-05-26 PROCEDURE — 86359 T CELLS TOTAL COUNT: CPT | Mod: XU | Performed by: PATHOLOGY

## 2023-05-26 RX ADMIN — HEPATITIS B VACCINE (RECOMBINANT) 20 MCG: 20 INJECTION, SUSPENSION INTRAMUSCULAR at 11:41

## 2023-05-26 ASSESSMENT — PAIN SCALES - GENERAL: PAINLEVEL: NO PAIN (0)

## 2023-05-26 NOTE — NURSING NOTE
Chief Complaint   Patient presents with     RECHECK     Follow up with B20     /76   Pulse 62   Temp 98.1  F (36.7  C) (Oral)   Ht 1.829 m (6')   Wt 76 kg (167 lb 8 oz)   BMI 22.72 kg/m    Muna Washington CMA on 5/26/2023 at 10:53 AM

## 2023-05-26 NOTE — LETTER
5/26/2023       RE: Kamron Headley  [address]  [place] [address]     Dear Colleague,    Thank you for referring your patient, Kamron Headley, to the [facility] INFECTI[facility] DISE[facility] [place] at [facility]. Please see a copy of my visit note below.    [facility]  General Infectious Diseases/HIV Progress Note     Patient:  Kamron Headley, Date of birth 1990  Medical record number [id number]  Date of Visit:  05/24/2023    Assessment and Plan:   1. HIV/AIDS, CD4 163/23%   - CD4 autumn 112/16%  2. L lip ulcers, slightly improved   - Chronic inflammation on path, cultures negative, organism staining negative  3. Mild transaminitis - resolving  4. H/o HPV+ condyloma acuminatum (resected 2013)  5. Penile warts  5. Hepatitis B nonimmune, nonexposed    Kamron presents to clinic today for a routine follow up visit. He is feeling very well aside from his persistent cheek ulcer. I am reassured by his recent biopsy with Dermatology that was negative for multiple infectious etiologies via staining and culture. AFB culture is still pending, but the pathology is not consistent with a mycobacterial organism so my suspicion is very low. I suspect the ulcers are simply aphthous that have taken a while to heal 2/2 immunosuppression vs directly HIV related. Either way, continuing ART and immune recovery will ultimately be the most helpful for healing. In the interim, it is not unreasonable to try a course of a topical steroid to see if this helps calm down the local inflammation. The systemic absorption of topical steroids is low and I anticipate this to be a short course of therapy so I am not concerned about compounding immunosuppression. I do think he needs continued follow up with Dermatology and repeat biopsy and cultures if it persists despite VL suppression and immune recovery.     We will start his Hep B series today, with an  additional dose given his immune suppression. Plan for a screening quant gold today, and additional vaccines at next visit.     Recommendations:  1. Continue biktarvy  2. Continue bactrim until CD4 >200  3. Repeat CD4, HIV VL, quantiferon gold today  4. Hep B series started today, will need nursing visits at 1, 2, and 6 months to complete series  5. Discontinue fluconazole  6. Agree with a trial of topical steroids for lip ulcer per Dermatology,    Health care maintenance:  Vaccines due: HepB, Tdap, MenACWY, finish HPV series, shingrix, PCV20  Screening due: Quant gold    Follow up: 3 months, w/ labs    I spent a total of 31 minutes on the day of the visit.   Time spent by me doing chart review, history and exam, documentation and further activities per the note      Madie Turner MD.   Pager [phone]  Infectious Diseases        Interval History:   -Last seen: 3/31/23  -Current HIV regimen: Biktarvy      -How many missed doses in 4 weeks: None. Tolerating both bactrim and biktarvy well.   -Since the last visit:     Kamron was seen by Dermatology for penile warts and his lip ulcer. Cryotherapy was used on the warts and he was given topical aldara. He prefers not to use this while he is immunosuppressed so he hasn't been using it. He will be seeing Dermatology again and is hoping they will do more cryotherapy. The lip ulcer was biopsied and cultured. Pathology is consistent with chronic inflammation. Staining is negative for CMV, HSV, VZV, HHV8, Treponema, fungal, and mycobacterial organisms. AFB, bacterial, and fungal cultures are negative thus far (only AFB is still pending). Dermatology recommended a topical steroid and he has not yet started it as he wanted to discuss with me. He has been taking fluconazole as we had previously discussed and is wondering if he should stop.     Kamron was also seen by Colorectal Surgery regarding his anal warts. He would like his procedure to be performed by   Yulia, but has not be able to schedule with him due to limited availability. He is planning on discussing with the Colorectal team again today, if he cannot get in, he will let me know.     Kamron notes today that he overall feels well. He has no fevers, chills, chest pain, SOB, abdominal pain, or loose stools. His only complaint is that his cheek ulcer is still present and bothersome. As above he hasn't yet started the steroid and is wondering if it will be immunosuppressive to do so. He doesn't think it is getting bigger, but it is sometimes painful to eat. He has a lot of life stressors at this time including his new HIV diagnosis, upcoming move, and stress at work.      Review of Systems: Remaining systems all reviewed and negative.    Past Medical History:  Condylomata acuminatum    HIV:  -diagnosis: 2016  -previous ART regimens: none  -resistance: none 2/24/23     A. Vaccination/Serology status.   -Hepatitis A: Immune  -Hepatitis B: Nonimmune, nonexposed  -Strep pneumo: due (will wait until CD4 >200)  -TDaP: last 2012, due  -Zoster: due (will wait until CD4 >200)  -Meningococcal: due (previously had menomune)  -Influenza   -COVID: up to date  -Mpox: up to date     B. Routine Infectious Disease screening.   -Hepatitis C: negative (2023)   -Toxo IgG: negative (2023)  -CMV IGG: negative (2023)   -TB screen: due  -STD sceen: GC/CT: negative; RPR negative (2023);   -HLA-B*57-01: negative  -Resistance: pan-S (2/24/23)     C. Cardiovascular, renal and bone health.   -Cholesterol: wnl (2023)  -Blood pressure: 119/76     D. Cancer screening (if applicable)  -Pap smear: +HPV condyloma, awaiting colorectal surgery evaluation    Medications:  Current Outpatient Medications   Medication Sig Dispense Refill    bictegravir-emtricitabine-tenofovir (BIKTARVY) -25 MG per tablet Take 1 tablet by mouth daily 30 tablet 1    clobetasol (TEMOVATE) 0.05 % external ointment Apply topically 2 times daily To ulcer as needed  to help improve healing 30 g 1    imiquimod (ALDARA) 5 % external cream Apply a small sized amount to warts or molluscum three times weekly at bedtime.   Wash off after 8 hours.   May use for up to 16 weeks. 12 packet 3    sulfamethoxazole-trimethoprim (BACTRIM DS) 800-160 MG tablet Take 1 tablet by mouth daily before breakfast 30 tablet 1       No Known Allergies    Immunizations:  Immunization History   Administered Date(s) Administered    COVID-19 Bivalent 18+ (Moderna) 12/17/2022    COVID-19 Monovalent 18+ (Moderna) 04/15/2021, 05/13/2021, 12/01/2021    DTAP (<7y) 05/03/1995    HEPA 08/22/2008, 10/29/2012    HIB (PRP-T) 03/22/1991, 05/17/1991, 09/16/1991    HPV9 02/16/2023    HepB 09/10/1999, 11/01/1999, 05/01/2000    Influenza Vaccine >6 months (Alfuria,Fluzone) 12/17/2022    MMR 09/10/1999    Meningococcal (Menomune ) 08/22/2008    OPV, trivalent, live 05/03/1995    Smallpox/Mpox Vaccine Subcutaneous (JYNNEOS) 08/19/2022, 09/28/2022    TD,PF 7+ (Tenivac) 06/17/2002    TDAP Vaccine (Adacel) 10/29/2012       Exam:  /76   Pulse 62   Temp 98.1  F (36.7  C) (Oral)   Ht 1.829 m (6')   Wt 76 kg (167 lb 8 oz)   BMI 22.72 kg/m    Wt Readings from Last 2 Encounters:   05/01/23 80.2 kg (176 lb 12.8 oz)   03/31/23 74.8 kg (165 lb)       Physical Examination:  GENERAL:  well-appearing. no acute distress.  HEAD:  Head is normocephalic, atraumatic   EYES:  Eyes have anicteric sclerae without conjunctival injection.   ENT: Persistent superficial ulceration of L buccal skin, no surrounding erythema or drainage. No additional lesions noted.   NECK:  Supple. No cervical lymphadenopathy  LUNGS: Normal WOB on RA.   SKIN:  No acute rashes.    EXTREMITIES: No joint erythema or swelling.   NEUROLOGIC:  Grossly nonfocal. Active x4 extremities. Alert. Oriented.          Laboratory Data:     Cd4 Total Cd4%   CD4% [place] T Cells   Date Value Ref Range Status   02/16/2023 16 (L) 28 - 63 % Final   ( Absolute CD4, [place] T Cells    Date Value Ref Range Status   02/16/2023 112 (L) 441 - 2,156 cells/uL Final   (       HIV-1 RNA Quantitative:  HIV-1 RNA Copies/mL, Instrument   Date Value Ref Range Status   02/24/2023 28,938 (H) <1 copies/mL Final   02/16/2023 23,336 (H) <1 copies/mL Final     HIV-1 RNA copies/mL   Date Value Ref Range Status   03/25/2023 <20 (A) Not Detected Copies/mL Final   (    Metabolic Studies       Recent Labs   Lab Test 02/24/23  1029 02/16/23  1119    139   POTASSIUM 4.4 3.8   CHLORIDE 100 100   CO2 28 24   ANIONGAP 11 15   BUN 8.8 8.5   CR 0.57* 0.58*   GFRESTIMATED >90 >90   GLC 96 85   DENISHA 10.0 10.1*       Hepatic Studies    Recent Labs   Lab Test 02/24/23  1029 02/16/23  1119   BILITOTAL 0.5 0.6   ALKPHOS 136* 152*   PROTTOTAL 8.7* 8.7*   ALBUMIN 4.8 4.7   AST 48 61*   ALT 78* 107*       Hematology Studies      Recent Labs   Lab Test 02/24/23  1029 02/16/23  1119   WBC 3.5* 3.3*   HGB 15.4 14.5   HCT 44.4 42.1    202       Again, thank you for allowing me to participate in the care of your patient.      Sincerely,    Madie Turner MD

## 2023-05-28 LAB
GAMMA INTERFERON BACKGROUND BLD IA-ACNC: 0.07 IU/ML
M TB IFN-G BLD-IMP: NEGATIVE
M TB IFN-G CD4+ BCKGRND COR BLD-ACNC: 4.78 IU/ML
MITOGEN IGNF BCKGRD COR BLD-ACNC: -0.02 IU/ML
MITOGEN IGNF BCKGRD COR BLD-ACNC: 0 IU/ML
QUANTIFERON MITOGEN: 4.85 IU/ML
QUANTIFERON NIL TUBE: 0.07 IU/ML
QUANTIFERON TB1 TUBE: 0.07 IU/ML
QUANTIFERON TB2 TUBE: 0.05

## 2023-05-30 ENCOUNTER — MYC MEDICAL ADVICE (OUTPATIENT)
Dept: INFECTIOUS DISEASES | Facility: CLINIC | Age: 33
End: 2023-05-30
Payer: COMMERCIAL

## 2023-05-30 LAB
HIV1 RNA # PLAS NAA DL=20: 36 COPIES/ML
HIV1 RNA SERPL NAA+PROBE-LOG#: 1.6 {LOG_COPIES}/ML

## 2023-06-03 ENCOUNTER — HEALTH MAINTENANCE LETTER (OUTPATIENT)
Age: 33
End: 2023-06-03

## 2023-06-12 NOTE — CONFIDENTIAL NOTE
The [facility] was unable to accommodate the wait list request to add patient's surgery with Dr. Sal Bender on Thurs 6/15/2023 due to the [facility] is at capacity for staffing. Patient could have the surgery with Dr. Mike Wolf instead, but has stated that he specifically wants surgery with Dr. Sal Bender.    Sent message to Bárbara Olivo NP asking patient's labs, since the lab results may be delaying surgery in any case. Awaiting her response.    Per response from Bárbara Olivo NP, patient not yet cleared for surgery, waiting for improvement on HIV status and labs before able to proceed with surgery.    Kizzy Waldron  Leanne-op Coordinator  [place]  Direct Phone: [phone]

## 2023-06-12 NOTE — TELEPHONE ENCOUNTER
Per Bárbara Olivo NP, patient's labs were reviewed, patient not yet cleared for surgery.     Kizzy Waldron  Leanne-op Coordinator  [place]  Direct Phone: [phone]

## 2023-06-22 LAB
ACID FAST STAIN (ARUP): NORMAL

## 2023-06-22 NOTE — CONFIDENTIAL NOTE
Per Bárbara Olivo NP, patient's labs were reviewed, patient not yet cleared for surgery. Waiting for HIV to improve, Bárbara Olivo NP, will notify  when patient is cleared for surgery.    Kizzy Waldron  Leanne-op Coordinator  [facility]  Direct Phone: [phone]   Physical Therapy  Visit Type: initial evaluation and treatment  Precautions:  Medical precautions:  fall risk; standard precautions.  Patient is a 41 year old male admitted after sustaining 12' fall off of forklift. Patient sustained:  -- pelvic ring injury including nondisplaced right sacral ala fracture with subtle left SI joint widening and nondisplaced right pubic body fracture  -- left shoulder injury with subscapularis strain  -- right meralgia paresthetica  -- left rib fracture, 11  -- T12-L3 left transverse process fractures  Lines:     Basic: capped IV      Lines in chart and on patient reviewed, precautions maintained throughout session.  Upper Extremity:    Left: weight bearing as tolerated      Left upper extremity range of motion as tolerated  Lower Extremity:    Left:  weight bearing: partial (50%).    Right:  weight bearing: partial (50%).  Hearing: no hearing deficits  Vision:     Current vision: no visual deficits  Safety Measures: chair alarm and bed alarm    SUBJECTIVE  Patient agreed to participate in therapy this date.  During treatment session therapist was wearing mask and patient was not wearing mask     \"I know I'm marcell - no surgeries, no head injuries - but I'm in a lot of pain.\"    Prior Living Situation  Information Provided By:: patient  Type of Home: House  Home Layout: Two level  # Steps to Enter: 1  # Steps in the Home: 18  Number of Rails: 1  Lives With: Spouse, Family  Receives Help From: None  Transportation: Drives  Bathroom Accessibility: Entry Level    Prior Communication/Cognition  Prior Cognition: Intact    Prior Function  Prior ADL: Independent  Bed Mobility: Independent  Transfers: Independent  Ambulation in the Home: Independent  Ambulation in the Community: Independent  Steps into the Home: Independent  Steps within the Home: Independent  Car Transfers: Independent  History of Falls in past year: No        Patient / Family Goal: return to previous functional status and  return home    Pain   RN informed on pain level     OBJECTIVE     Oriented to person, place, time and situation     Arousal alertness: appropriate responses to stimuli  Patient activity tolerance: 2 to 1 activity to rest  Range of Motion (measured in degrees unless otherwise noted, active unless indicated)  WFL: LLE, RLE  Strength (out of 5 unless otherwise indicated)   Comments / Details:  Formal MMT and range of motion assessment deferred due to pain and weightbearing  Precautions; patient demonstrates at least 3/5 hip fleixon, knee extension, dorsiflexion  Balance (trials measured in sec unless otherwise indicted)    Sitting: Static: modified independent, Dynamic: modified independent    Standing - Firm Surface - Eyes Open: Static: supervision Dynamic: supervision  Neurological Comments / Details: Reports right anterior thigh numbness    Bed Mobility:        Supine to sit: supervision and with verbal cues  Training completed:    Tasks: supine to sit    Education details: body mechanics, patient safety and patient demonstrates understanding    Patient cued for use of handrails, sequencing of lower extremity/ upper extremity movement to edge of bed.  Transfers:    Assistive devices: 2-wheeled walker and gait belt    Sit to stand: supervision    Stand pivot: supervision  Training completed:    Tasks: sit to stand and stand pivot    Education details: body mechanics, patient safety, patient verbalizes precautions and patient demonstrates understanding  Gait/Ambulation:     Assistance: supervision   Assistive device: 2-wheeled walker    Distance (ft): 15; 15; 30    Type: decreased dulce and step through    Stance phase: Left: decreased heel strike, decreased push off and decreased time in single limb stance; Right: decreased push off, decreased heel strike and decreased time in single limb stance    Surface: tile and even  Training Completed:    Tasks: gait training on level surfaces and assistive device use     Education details: body mechanics, patient safety and patient demonstrates understanding      Interventions     Training provided: activity tolerance, balance retraining, bed mobility training, body mechanics, energy conservation, gait training, safety training, transfer training, use of assistive device and compensatory techniques  Verbal cues to increase patient safety include:  - Hand placement for transfer  - Maintaining assistive device close to center of mass  - Eccentric control when sitting  Skilled input: Verbal instruction/cues and tactile instruction/cues  Verbal Consent: Writer verbally educated and received verbal consent for hand placement, positioning of patient, and techniques to be performed today from patient for clothing adjustments for techniques and therapist position for techniques as described above and how they are pertinent to the patient's plan of care.       ASSESSMENT    Impairments: range of motion, strength, balance deficits, safety awareness, pain and activity tolerance  Functional Limitations: all functional mobility  Therapy Diagnosis:  Other Abnormalities of Gait and Mobility     Patient performs bed mobility, transfers, and short distance ambulation with SUPV and RW. Will benefit from 1 additional physical therapy session to assess/train stairs as patient has 18 interior stairs in his home with right railing - deferred on this date due to pain tolerance.    Patient currently limited by pain, strength, and activity tolerance all contributing to decreased independence and increased fall risk. Anticipate patient will progress to PLOF at home with HH vs. OP physical therapy once weightbearing precautions are lifted. Patient will continue to benefit from skilled physical therapy to progress towards goals and maximize safety and independence at a household level.       Discharge Recommendations  Recommendation for Discharge: PT IL: Patient is appropriate for Physical Therapy 1-3 times  per week        Skilled therapy is required to address these limitations in attempt to maximize the patient's independence.    End of Session:   Location: in chair  Safety measures: alarm system in place/re-engaged, call light within reach and lines intact  Handoff to: nurse  Education Provided:   Learning assessment:  - Primary learner: patient  - Are they ready to learn: yes  - Preferred learning style: demonstration and verbal  - Barriers to learning: no barriers apparent at this time  Education provided during session:  - Receiving education: patient  - Education includes:  - physical therapy POC  - goals for session  - patient's current impairments in the context of discharge planning  - Results of above outlined education: Verbalizes understanding and Demonstrates understanding    PLAN    Suggestions for next session as indicated: PT Frequency: 5 days/week  Frequency Comments: trauma 5/19 home P1-1 (1 more session for stairs)      A minimum of 8 minutes per session x 1 week.   Interventions: balance, body mechanics, bed mobility, energy conservation, functional transfer training, gait training, strengthening, ROM, safety education and stairs retraining Next session: stair training  Agreement to plan and goals: patient agrees with goals and treatment plan        GOALS  Long Term Goals: (to be met by time of discharge from hospital)  Sit to supine: Patient will complete sit to supine modified independent.  Supine to sit: Patient will complete supine to sit modified independent.  Sit to stand: Patient will complete sit to stand transfer with least restrictive device, modified independent.   Stand to sit: Patient will complete stand to sit transfer with least restrictive device, modified independent.   Ambulation (even): Patient will ambulate on even surface for 150 feet with least restrictive device, modified independent.   3-4 steps: Patient will ambulate 3-4 steps with least restrictive device supervision.      Documented in the chart in the following areas: Prior Level of Function. Assessment.      Therapy procedure time and total treatment time can be found documented on the Time Entry flowsheet

## 2023-06-27 ENCOUNTER — ALLIED HEALTH/NURSE VISIT (OUTPATIENT)
Dept: TRANSPLANT | Facility: CLINIC | Age: 33
End: 2023-06-27
Payer: COMMERCIAL

## 2023-06-27 DIAGNOSIS — F60.3 BORDERLINE PERSONALITY DISORDER (H): Primary | ICD-10-CM

## 2023-06-27 DIAGNOSIS — Z21 HIV-1 (HUMAN IMMUNODEFICIENCY VIRUS I) (H): ICD-10-CM

## 2023-06-27 PROCEDURE — 250N000011 HC RX IP 250 OP 636: Performed by: INTERNAL MEDICINE

## 2023-06-27 PROCEDURE — 90746 HEPB VACCINE 3 DOSE ADULT IM: CPT | Performed by: INTERNAL MEDICINE

## 2023-06-27 PROCEDURE — G0010 ADMIN HEPATITIS B VACCINE: HCPCS | Performed by: INTERNAL MEDICINE

## 2023-06-27 RX ADMIN — HEPATITIS B VACCINE (RECOMBINANT) 20 MCG: 20 INJECTION, SUSPENSION INTRAMUSCULAR at 15:25

## 2023-07-05 DIAGNOSIS — Z21 HIV-1 (HUMAN IMMUNODEFICIENCY VIRUS I) (H): ICD-10-CM

## 2023-07-05 DIAGNOSIS — B20 AIDS (ACQUIRED IMMUNODEFICIENCY SYNDROME), CD4 <=200 (H): ICD-10-CM

## 2023-07-05 RX ORDER — SULFAMETHOXAZOLE/TRIMETHOPRIM 800-160 MG
1 TABLET ORAL
Qty: 30 TABLET | Refills: 1 | Status: SHIPPED | OUTPATIENT
Start: 2023-07-05 | End: 2023-10-10

## 2023-07-10 ENCOUNTER — TELEPHONE (OUTPATIENT)
Dept: TRANSPLANT | Facility: CLINIC | Age: 33
End: 2023-07-10
Payer: COMMERCIAL

## 2023-07-10 NOTE — TELEPHONE ENCOUNTER
EP called 7/10 to let pt know of time change on  7/25 nurse appt. He was okay with it.       ----- Message from Maulik Molina RN sent at 7/6/2023  7:00 AM CDT -----  On 7/25 would you put this patient on the MA schedule not the ID nurse schedule? Thank you       Maulik Molina RN  Infectious Disease on 7/6/2023 at 7:00 AM

## 2023-07-21 ENCOUNTER — TELEPHONE (OUTPATIENT)
Dept: DERMATOLOGY | Facility: CLINIC | Age: 33
End: 2023-07-21
Payer: COMMERCIAL

## 2023-07-21 NOTE — TELEPHONE ENCOUNTER
Kwamem my chart msg for patient to schedule  the following per patient request    Appointment type: Return  Provider: Dr. Manuel or Any Provider  Return date: Soonest   Specialty phone number: [phone]

## 2023-07-25 ENCOUNTER — ALLIED HEALTH/NURSE VISIT (OUTPATIENT)
Dept: TRANSPLANT | Facility: CLINIC | Age: 33
End: 2023-07-25
Payer: COMMERCIAL

## 2023-07-25 DIAGNOSIS — B20 AIDS (ACQUIRED IMMUNODEFICIENCY SYNDROME), CD4 <=200 (H): ICD-10-CM

## 2023-07-25 DIAGNOSIS — Z23 NEED FOR VACCINATION: Primary | ICD-10-CM

## 2023-07-25 PROCEDURE — G0010 ADMIN HEPATITIS B VACCINE: HCPCS | Performed by: INTERNAL MEDICINE

## 2023-07-25 PROCEDURE — 90746 HEPB VACCINE 3 DOSE ADULT IM: CPT | Performed by: INTERNAL MEDICINE

## 2023-07-25 PROCEDURE — 250N000011 HC RX IP 250 OP 636: Performed by: INTERNAL MEDICINE

## 2023-07-25 RX ADMIN — HEPATITIS B VACCINE (RECOMBINANT) 20 MCG: 20 INJECTION, SUSPENSION INTRAMUSCULAR at 14:06

## 2023-07-25 NOTE — NURSING NOTE
Clinic Administered Medication Documentation      Injectable Medication Documentation    Is there an active order (written within the past 365 days, with administrations remaining, not ) in the chart? Yes.     Patient was given  Hep B vaccine . Prior to medication administration, verified patient's identity using patient s name and date of birth. Please see MAR and medication order for additional information. Patient instructed to remain in clinic for 15 minutes and report any adverse reaction to staff immediately.    Vial/Syringe: Syringe  Was this medication supplied by the patient? No  Is this a medication the patient will need to receive again? Yes. Last dose has been ordered.     Denise Metcalf, [facility]  2023 2:08 PM

## 2023-08-11 ENCOUNTER — OFFICE VISIT (OUTPATIENT)
Dept: DERMATOLOGY | Facility: CLINIC | Age: 33
End: 2023-08-11
Payer: COMMERCIAL

## 2023-08-11 DIAGNOSIS — A63.0 CONDYLOMA ACUMINATA: Primary | ICD-10-CM

## 2023-08-11 DIAGNOSIS — L81.9 POST-INFLAMMATORY PIGMENTARY CHANGES: ICD-10-CM

## 2023-08-11 PROCEDURE — 54056 CRYOSURGERY PENIS LESION(S): CPT | Mod: GC | Performed by: STUDENT IN AN ORGANIZED HEALTH CARE EDUCATION/TRAINING PROGRAM

## 2023-08-11 PROCEDURE — 99214 OFFICE O/P EST MOD 30 MIN: CPT | Mod: 25 | Performed by: STUDENT IN AN ORGANIZED HEALTH CARE EDUCATION/TRAINING PROGRAM

## 2023-08-11 NOTE — NURSING NOTE
Dermatology Rooming Note    Kamron Headley's goals for this visit include:   Chief Complaint   Patient presents with    Derm Problem     Warts follow up for genital warts. Pt says no improvement.     Ata Falcon, EMT-B

## 2023-08-11 NOTE — PROGRESS NOTES
[facility] Dermatology Note  Encounter Date: August 11, 2023    Office Visit     Dermatology Problem List:  NUB  - L cheek, s/p shave bx and cx 4/27/23  2. Condylomata  - s/p LN2 to penile; colorectal managing perianal  - Current Tx: aldara  3. 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  Patient had several questions about management of his penile lesions and wether or not the imiquimod would be a reasonable given his HIV and low immune system. Provided education and encouragement. Agreeable to cryotherapy today and will start using the imiquimod at home.   - Cryotherapy performed today (see procedure note(s) below).   - 3 penile lesions; last cryotherapy April  - Encouraged him to start using the imiquimod he was prescribed and provided education   - apply daily, titrate use to comfort   - discussed shave removal at next visit for large wart R base of shaft     # NUB.    Lesion has resolved no concerns today     Procedures Performed:   - Cryotherapy procedure note, location(s): penis. After verbal consent and discussion of risks and benefits including, but not limited to, dyspigmentation/scar, blister, and pain, 3 lesion(s) was(were) treated with 1-2 mm freeze border for 1-2 cycles with liquid nitrogen. Post cryotherapy instructions were provided.      Follow-up: prn for new or changing lesions.     Staff and Resident:     Zia Camilo PGY3  Attending Star Manuel  ____________________________________________    CC: Patient presents with:  Derm Problem: Warts follow up for genital warts. Pt says no improvement.       HPI:  Kamron thomas is a(n) 33 year old year old male  who presents today as a return patient for condylomata penile warts    Patient is otherwise feeling well, without additional skin concerns.    Labs Reviewed:  Path  results from cheek biopsy showed granulation tissue and mixed cell inflammatory infiltrate    Physical Exam:  Vitals: LMP 07/24/2023 (Approximate)   SKIN: Focused examination of penis was performed.  - 3 wart-like lesions seen, one on the dorsal base ~1-2 mm diameter and two on the right side of the penis one ~1-2 mm diameter and one ~8 mm diameter  - No other lesions of concern on areas examined.     Medications:  Current Outpatient Medications   Medication    bictegravir-emtricitabine-tenofovir (BIKTARVY) -25 MG per tablet    FLUZONE QUADRIVALENT 0.5 ML injection    MODERNA COVID-19 BIVALENT 50 MCG/0.5ML injection    sulfamethoxazole-trimethoprim (BACTRIM DS) 800-160 MG tablet    clobetasol (TEMOVATE) 0.05 % external ointment    imiquimod (ALDARA) 5 % external cream     Current Facility-Administered Medications   Medication    lidocaine 1% with EPINEPHrine 1:100,000 injection 3 mL        Past Medical History:   Past Medical History:   Diagnosis Date    NO ACTIVE PROBLEMS           CC Referred Self, MD  No address on file on close of this encounter.

## 2023-08-11 NOTE — PATIENT INSTRUCTIONS
Apply imiquimod nightly under a band aid as tolerated. Pause treatment if it becomes too irritated     CRYOTHERAPY    What is it?  Use of a very cold liquid, such as liquid nitrogen, to freeze and destroy abnormal skin cells that need to be removed    What should I expect?  Tenderness and redness  A small blister that might grow and fill with dark purple blood.  More than one treatment may be needed if the lesions do not go away.    How do I care for the treated area?  Gently wash the area with your hands when bathing.  Use a thin layer of VaselineÆ to help with healing.   The area should heal within 7-10 days.  Do not use an antibiotic or NeosporinÆ ointment.   You may take acetaminophen (TylenolÆ) for pain.     Call your Doctor if you have:  Severe pain  Signs of infection (warmth, redness, cloudy yellow drainage, and or a bad smell)  Questions or concerns

## 2023-08-11 NOTE — LETTER
8/11/2023       RE: Kamron Headley  [address]  [facility] [place] [facility] [address]     Dear Colleague,    Thank you for referring your patient, Kamron Headley, to the [facility] [place] [facility]. Please see a copy of my visit note below.    [facility] Dermatology Note  Encounter Date: August 11, 2023    Office Visit     Dermatology Problem List:  NUB  - L cheek, s/p shave bx and cx 4/27/23  2. Condylomata  - s/p LN2 to penile; colorectal managing perianal  - Current Tx: aldara  3. 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  Patient had several questions about management of his penile lesions and wether or not the imiquimod would be a reasonable given his HIV and low immune system. Provided education and encouragement. Agreeable to cryotherapy today and will start using the imiquimod at home.   - Cryotherapy performed today (see procedure note(s) below).   - 3 penile lesions; last cryotherapy April  - Encouraged him to start using the imiquimod he was prescribed and provided education   - apply daily, titrate use to comfort   - discussed shave removal at next visit for large wart R base of shaft     # NUB.    Lesion has resolved no concerns today     Procedures Performed:   - Cryotherapy procedure note, location(s): penis. After verbal consent and discussion of risks and benefits including, but not limited to, dyspigmentation/scar, blister, and pain, 3 lesion(s) was(were) treated with 1-2 mm freeze border for 1-2 cycles with liquid nitrogen. Post cryotherapy instructions were provided.      Follow-up: prn for new or changing lesions.     Staff and Resident:     Zia Camilo PGY3  Attending Star Manuel  ____________________________________________    CC:  Patient presents with:  Derm Problem: Warts follow up for genital warts. Pt says no improvement.       HPI:  Kamron thomas is a(n) 33 year old year old male  who presents today as a return patient for condylomata penile warts    Patient is otherwise feeling well, without additional skin concerns.    Labs Reviewed:  Path results from cheek biopsy showed granulation tissue and mixed cell inflammatory infiltrate    Physical Exam:  Vitals: LMP 07/24/2023 (Approximate)   SKIN: Focused examination of penis was performed.  - 3 wart-like lesions seen, one on the dorsal base ~1-2 mm diameter and two on the right side of the penis one ~1-2 mm diameter and one ~8 mm diameter  - No other lesions of concern on areas examined.     Medications:  Current Outpatient Medications   Medication    bictegravir-emtricitabine-tenofovir (BIKTARVY) -25 MG per tablet    FLUZONE QUADRIVALENT 0.5 ML injection    MODERNA COVID-19 BIVALENT 50 MCG/0.5ML injection    sulfamethoxazole-trimethoprim (BACTRIM DS) 800-160 MG tablet    clobetasol (TEMOVATE) 0.05 % external ointment    imiquimod (ALDARA) 5 % external cream     Current Facility-Administered Medications   Medication    lidocaine 1% with EPINEPHrine 1:100,000 injection 3 mL        Past Medical History:   Past Medical History:   Diagnosis Date    NO ACTIVE PROBLEMS           CC Referred Self, MD  No address on file on close of this encounter.     Attestation with edits by Star Manuel MD at 8/11/2023  2:49 PM:  I have personally examined this patient and agree with the resident doctor's documentation and plan of care. I have reviewed and amended the resident's note. The documentation accurately reflects my clinical observations, diagnoses, treatment and follow-up plans. I was present for key portions of the procedure(s).       Star Manuel MD  Dermatology Staff      Dermatology Rooming Note    Kamron Headley's goals for this visit include:   Chief Complaint   Patient presents  with    Derm Problem     Warts follow up for genital warts. Pt says no improvement.     Ata Falcon, EMT-B

## 2023-08-22 NOTE — PROGRESS NOTES
[facility]  General Infectious Diseases/HIV Progress Note     Patient:  Kamron Headley, Date of birth 1990  Medical record number [id number]  Date of Visit:  08/22/2023    Assessment and Plan:   1. HIV/AIDS, VL pending; CD4 214/24%               - VL 28,938; CD4 (autumn) 112/16% 2/24/23  - VL 36; CD4 163/23% 5/26/23  2. L lip ulcers, resolved w/ topical steroid               - Chronic inflammation on path, cultures negative, organism staining negative  3. Mild transaminitis - low level elevation persists, will continue to monitor  4. H/o HPV+ condyloma acuminatum (resected 2013)   - Working with CRS, monitoring CD4 count trajectory  5. Penile warts - resolved  6. Hepatitis B nonimmune, nonexposed   - Restarted immunization series with 4 doses   - Next dose ~11/2023 - will test for Ab response 1-2 months following last dose    Mr. Lundy is doing very well today. He has no new symptoms of concern, and his lip and penile lesions have resolved. He didn't get labs until after our visit today. CD4 is now 214. As such, will advise pt to discontinue bactrim for PJP ppx. I will also reach out to CRS given new data point. Thus far, he has demonstrated a 50 point increase every 3 months. Unclear if he will continue this trajectory, but he is at least >200 at this point, which is what I have been most concerned about.     Will try to get up to date on vaccines today too. He is due for Tdap and PCV20 and given the upcoming respiratory illness season, would like to give those today. We also discussed covid-19 boosting as it is starting to circulate early this year, and his last booster was 1 year ago (when he was not being treated for HIV). Unfortunately, we do not have the new bivalent vaccine yet, so we decided to vaccinate/boost with the moderna vaccine he has already had. He should theoretically have a more robust response than previously, and theoretically better protection, though the  variants are changing so quickly I'm not sure this will be the most effective vaccine for him. We discussed that he should return to clinic for the new booster when it is available, which he is very agreeable to doing.     Recommendations:  1. Continue biktarvy  2. Discontinue bactrim  3. Pt to stop by lab after visit. Will follow up with him with results via [employer].   4. RTC for new Covid booster when available.     Health care maintenance:  Vaccines due: Tdap, MenACWY, finish HPV series, shingrix, PCV20 - will give bolded vaccines today, as well as bivalent covid booster  Screening due: none    Follow up: Will decide based on CD4/labs obtained after visit. Either January with me, or November with a covering colleague.     I spent a total of 61 minutes on the day of the visit.   Time spent by me doing chart review, history and exam, documentation and further activities per the note    Madie Turner MD.   Pager [phone]  Infectious Diseases        Interval History:   -Last seen: 5/26/23  -Current HIV regimen: Biktarvy     -How many missed doses in 4 weeks: noen  -Since the last visit:     Kamron continues to follow with Dermatology for his penile warts. He most recently underwent cryotherapy, and the warts have since totally resolved. They gave him topical imiquimod to try, but he notes the lesions resolved so he didn't start using it. The lip ulcer has resolved with topical steroid therapy as well. He has no new lesions.     Since our last visit, Kamron continued to discuss timing of anal wart removal with CRS and they recommended waiting until he has a CD4 count >300. I discussed this with Dr. Bender as well, and we agreed to wait for his next CD4 (today's lab that is currently pending) to see if he will continue to show CD4 recovery, or if 200 is a more reasonable goal.     Overall, Kamron is feeling well and feels that he is coming to terms with his diagnosis and life beyond it. He feels he is out  "of the \"shock\" phase, and is working on issues one at a time. The next big issue on his plate is the anal warts and he is eager to have these surgically taken care of. He denies any new infectious symptoms or recent infections. He moved into a new apartment in [place] and likes the location. He works from home, and has had a more stable schedule which has been helpful for him. He has a good view and is close to a gym. He is planning on going to [place] in September to visit some friends.     Review of Systems:Remaining systems all reviewed and negative.    Past Medical History:  Condylomata acuminatum     HIV:  -diagnosis: 2016  -previous ART regimens: none  -resistance: none 2/24/23     A. Vaccination/Serology status.   -Hepatitis A: Immune  -Hepatitis B: Currently in series, recommended 4 doses   5/26/23; 6/27/23; 7/25/23 - next ~11/2023  -Strep pneumo: due  -TDaP: last 2012, due  -Zoster: due (will wait until CD4 >200)  -Meningococcal: due (previously had menomune)  -Influenza   -COVID: repeat bivalent booster today  -Mpox: up to date     B. Routine Infectious Disease screening.   -Hepatitis C: negative (2023)   -Toxo IgG: negative (2023)  -CMV IGG: negative (2023)   -TB screen: negative quant gold 5/2023  -STD sceen: GC/CT: negative; RPR negative (2023);   -HLA-B*57-01: negative  -Resistance: pan-S (2/24/23)     C. Cardiovascular, renal and bone health.   -Cholesterol: wnl (2023)  -Blood pressure: 119/76     D. Cancer screening (if applicable)  -Pap smear: +HPV condyloma, awaiting colorectal surgery evaluation    Medications:  Current Outpatient Medications   Medication Sig Dispense Refill    bictegravir-emtricitabine-tenofovir (BIKTARVY) -25 MG per tablet Take 1 tablet by mouth daily 30 tablet 1    clobetasol (TEMOVATE) 0.05 % external ointment Apply topically 2 times daily To ulcer as needed to help improve healing (Patient not taking: Reported on 5/26/2023) 30 g 1    FLUZONE QUADRIVALENT 0.5 ML injection    "    imiquimod (ALDARA) 5 % external cream Apply a small sized amount to warts or molluscum three times weekly at bedtime.   Wash off after 8 hours.   May use for up to 16 weeks. (Patient not taking: Reported on 5/26/2023) 12 packet 3    MODERNA COVID-19 BIVALENT 50 MCG/0.5ML injection       sulfamethoxazole-trimethoprim (BACTRIM DS) 800-160 MG tablet Take 1 tablet by mouth daily before breakfast 30 tablet 1       No Known Allergies    Immunizations:  Immunization History   Administered Date(s) Administered    COVID-19 Bivalent 18+ (Moderna) 12/17/2022    COVID-19 Monovalent 18+ (Moderna) 04/15/2021, 05/13/2021, 12/01/2021    DTAP (<7y) 05/03/1995    HEPA 08/22/2008, 10/29/2012    HIB (PRP-T) 03/22/1991, 05/17/1991, 09/16/1991    HPV9 02/16/2023    HepB 09/10/1999, 11/01/1999, 05/01/2000    Hepatitis B, Adult 05/26/2023, 06/27/2023, 07/25/2023    Influenza Vaccine >6 months (Alfuria,Fluzone) 12/17/2022    MMR 09/10/1999    Meningococcal (Menomune ) 08/22/2008    OPV, trivalent, live 05/03/1995    Smallpox/Mpox Vaccine Subcutaneous (JYNNEOS) 08/19/2022, 09/28/2022    TD,PF 7+ (Tenivac) 06/17/2002    TDAP Vaccine (Adacel) 10/29/2012       Exam:  /81 (BP Location: Right arm, Patient Position: Sitting, Cuff Size: Adult Regular)   Pulse 99   Wt 75.5 kg (166 lb 8 oz)   SpO2 94%   BMI 22.58 kg/m     Wt Readings from Last 2 Encounters:   05/26/23 76 kg (167 lb 8 oz)   05/01/23 80.2 kg (176 lb 12.8 oz)     Physical Examination:   GENERAL:  well-appearing. no acute distress.  HEAD:  Head is normocephalic, atraumatic   EYES:  Eyes have anicteric sclerae without conjunctival injection. Pupils reactive bilaterally.   ENT:  Oropharynx is moist without exudates or ulcers. Tongue is midline. No sinus tenderness.   NECK:  Supple. No cervical lymphadenopathy  LUNGS: Normal WOB on RA.   SKIN:  No acute rashes.    EXTREMITIES: No joint erythema or swelling.   NEUROLOGIC:  Grossly nonfocal. Active x4 extremities. Alert.  Oriented.          Laboratory Data:     Cd4 Total Cd4%   CD4% [place] T Cells   Date Value Ref Range Status   05/26/2023 23 (L) 28 - 63 % Final   02/16/2023 16 (L) 28 - 63 % Final   ( Absolute CD4, [place] T Cells   Date Value Ref Range Status   05/26/2023 163 (L) 441 - 2,156 cells/uL Final   02/16/2023 112 (L) 441 - 2,156 cells/uL Final   (       HIV-1 RNA Quantitative:  HIV-1 RNA Copies/mL, Instrument   Date Value Ref Range Status   05/26/2023 36 (H) <1 copies/mL Final   02/24/2023 28,938 (H) <1 copies/mL Final   02/16/2023 23,336 (H) <1 copies/mL Final     HIV-1 RNA copies/mL   Date Value Ref Range Status   03/25/2023 <20 (A) Not Detected Copies/mL Final     Metabolic Studies       Recent Labs   Lab Test 05/26/23  1216 02/24/23  1029    139   POTASSIUM 4.0 4.4   CHLORIDE 102 100   CO2 29 28   ANIONGAP 10 11   BUN 6.7 8.8   CR 0.73 0.57*   GFRESTIMATED >90 >90   GLC 89 96   DENISHA 9.9 10.0     Hepatic Studies    Recent Labs   Lab Test 05/26/23  1216 02/24/23  1029 02/16/23  1119   BILITOTAL 0.5 0.5 0.6   ALKPHOS 114 136* 152*   PROTTOTAL 8.1 8.7* 8.7*   ALBUMIN 4.8 4.8 4.7   AST 38 48 61*   ALT 67* 78* 107*     Hematology Studies      Recent Labs   Lab Test 05/26/23  1216 02/24/23  1029 02/16/23  1119   WBC 4.0 3.5* 3.3*   HGB 15.1 15.4 14.5   HCT 43.6 44.4 42.1    206 202

## 2023-08-25 ENCOUNTER — LAB (OUTPATIENT)
Dept: LAB | Facility: CLINIC | Age: 33
End: 2023-08-25
Payer: COMMERCIAL

## 2023-08-25 ENCOUNTER — OFFICE VISIT (OUTPATIENT)
Dept: INFECTIOUS DISEASES | Facility: CLINIC | Age: 33
End: 2023-08-25
Attending: INTERNAL MEDICINE
Payer: COMMERCIAL

## 2023-08-25 VITALS
HEART RATE: 99 BPM | SYSTOLIC BLOOD PRESSURE: 119 MMHG | WEIGHT: 166.5 LBS | DIASTOLIC BLOOD PRESSURE: 81 MMHG | BODY MASS INDEX: 22.58 KG/M2 | OXYGEN SATURATION: 94 %

## 2023-08-25 DIAGNOSIS — Z21 HIV-1 (HUMAN IMMUNODEFICIENCY VIRUS I) (H): ICD-10-CM

## 2023-08-25 DIAGNOSIS — B20 AIDS (ACQUIRED IMMUNODEFICIENCY SYNDROME), CD4 <=200 (H): ICD-10-CM

## 2023-08-25 DIAGNOSIS — B20 AIDS (ACQUIRED IMMUNODEFICIENCY SYNDROME), CD4 <=200 (H): Primary | ICD-10-CM

## 2023-08-25 LAB
ALBUMIN SERPL BCG-MCNC: 5 G/DL (ref 3.5–5.2)
ALP SERPL-CCNC: 95 U/L (ref 40–129)
ALT SERPL W P-5'-P-CCNC: 103 U/L (ref 0–70)
ANION GAP SERPL CALCULATED.3IONS-SCNC: 12 MMOL/L (ref 7–15)
AST SERPL W P-5'-P-CCNC: 49 U/L (ref 0–45)
BASOPHILS # BLD AUTO: 0 10E3/UL (ref 0–0.2)
BASOPHILS NFR BLD AUTO: 1 %
BILIRUB SERPL-MCNC: 0.8 MG/DL
BUN SERPL-MCNC: 9.5 MG/DL (ref 6–20)
CALCIUM SERPL-MCNC: 10.1 MG/DL (ref 8.6–10)
CD3 CELLS # BLD: 557 CELLS/UL (ref 603–2990)
CD3 CELLS NFR BLD: 62 % (ref 49–84)
CD3+CD4+ CELLS # BLD: 214 CELLS/UL (ref 441–2156)
CD3+CD4+ CELLS NFR BLD: 24 % (ref 28–63)
CD3+CD4+ CELLS/CD3+CD8+ CLL BLD: 0.7 % (ref 1.4–2.6)
CD3+CD8+ CELLS # BLD: 306 CELLS/UL (ref 125–1312)
CD3+CD8+ CELLS NFR BLD: 34 % (ref 10–40)
CHLORIDE SERPL-SCNC: 100 MMOL/L (ref 98–107)
CREAT SERPL-MCNC: 0.74 MG/DL (ref 0.67–1.17)
DEPRECATED HCO3 PLAS-SCNC: 26 MMOL/L (ref 22–29)
EOSINOPHIL # BLD AUTO: 0.1 10E3/UL (ref 0–0.7)
EOSINOPHIL NFR BLD AUTO: 1 %
ERYTHROCYTE [DISTWIDTH] IN BLOOD BY AUTOMATED COUNT: 11.9 % (ref 10–15)
GFR SERPL CREATININE-BSD FRML MDRD: >90 ML/MIN/1.73M2
GLUCOSE SERPL-MCNC: 105 MG/DL (ref 70–99)
HCT VFR BLD AUTO: 45.5 % (ref 40–53)
HGB BLD-MCNC: 16.3 G/DL (ref 13.3–17.7)
IMM GRANULOCYTES # BLD: 0 10E3/UL
IMM GRANULOCYTES NFR BLD: 0 %
LYMPHOCYTES # BLD AUTO: 0.8 10E3/UL (ref 0.8–5.3)
LYMPHOCYTES NFR BLD AUTO: 21 %
MCH RBC QN AUTO: 32.9 PG (ref 26.5–33)
MCHC RBC AUTO-ENTMCNC: 35.8 G/DL (ref 31.5–36.5)
MCV RBC AUTO: 92 FL (ref 78–100)
MONOCYTES # BLD AUTO: 0.3 10E3/UL (ref 0–1.3)
MONOCYTES NFR BLD AUTO: 7 %
NEUTROPHILS # BLD AUTO: 2.6 10E3/UL (ref 1.6–8.3)
NEUTROPHILS NFR BLD AUTO: 70 %
NRBC # BLD AUTO: 0 10E3/UL
NRBC BLD AUTO-RTO: 0 /100
PLATELET # BLD AUTO: 223 10E3/UL (ref 150–450)
POTASSIUM SERPL-SCNC: 3.5 MMOL/L (ref 3.4–5.3)
PROT SERPL-MCNC: 7.9 G/DL (ref 6.4–8.3)
RBC # BLD AUTO: 4.96 10E6/UL (ref 4.4–5.9)
SODIUM SERPL-SCNC: 138 MMOL/L (ref 136–145)
T CELL COMMENT: ABNORMAL
WBC # BLD AUTO: 3.7 10E3/UL (ref 4–11)

## 2023-08-25 PROCEDURE — 86360 T CELL ABSOLUTE COUNT/RATIO: CPT | Performed by: INTERNAL MEDICINE

## 2023-08-25 PROCEDURE — 90732 PPSV23 VACC 2 YRS+ SUBQ/IM: CPT | Performed by: INTERNAL MEDICINE

## 2023-08-25 PROCEDURE — 99000 SPECIMEN HANDLING OFFICE-LAB: CPT | Performed by: PATHOLOGY

## 2023-08-25 PROCEDURE — 80053 COMPREHEN METABOLIC PANEL: CPT | Performed by: PATHOLOGY

## 2023-08-25 PROCEDURE — 90472 IMMUNIZATION ADMIN EACH ADD: CPT | Performed by: INTERNAL MEDICINE

## 2023-08-25 PROCEDURE — 99215 OFFICE O/P EST HI 40 MIN: CPT | Performed by: INTERNAL MEDICINE

## 2023-08-25 PROCEDURE — G0009 ADMIN PNEUMOCOCCAL VACCINE: HCPCS | Performed by: INTERNAL MEDICINE

## 2023-08-25 PROCEDURE — 99417 PROLNG OP E/M EACH 15 MIN: CPT | Performed by: INTERNAL MEDICINE

## 2023-08-25 PROCEDURE — 86481 TB AG RESPONSE T-CELL SUSP: CPT | Performed by: INTERNAL MEDICINE

## 2023-08-25 PROCEDURE — 85025 COMPLETE CBC W/AUTO DIFF WBC: CPT | Performed by: PATHOLOGY

## 2023-08-25 PROCEDURE — 87536 HIV-1 QUANT&REVRSE TRNSCRPJ: CPT | Performed by: INTERNAL MEDICINE

## 2023-08-25 PROCEDURE — 36415 COLL VENOUS BLD VENIPUNCTURE: CPT | Performed by: PATHOLOGY

## 2023-08-25 PROCEDURE — 91313 HC RX IP 250 OP 636: CPT | Performed by: INTERNAL MEDICINE

## 2023-08-25 PROCEDURE — 250N000011 HC RX IP 250 OP 636: Performed by: INTERNAL MEDICINE

## 2023-08-25 PROCEDURE — 0134A HC ADMIN COVID VAC MODERNA BIVAL 0.5ML ADDITIONAL: CPT | Performed by: INTERNAL MEDICINE

## 2023-08-25 PROCEDURE — 90715 TDAP VACCINE 7 YRS/> IM: CPT | Performed by: INTERNAL MEDICINE

## 2023-08-25 PROCEDURE — 99211 OFF/OP EST MAY X REQ PHY/QHP: CPT | Mod: 25 | Performed by: INTERNAL MEDICINE

## 2023-08-25 RX ADMIN — TETANUS TOXOID, REDUCED DIPHTHERIA TOXOID AND ACELLULAR PERTUSSIS VACCINE, ADSORBED 0.5 ML: 5; 2.5; 8; 8; 2.5 SUSPENSION INTRAMUSCULAR at 11:07

## 2023-08-25 RX ADMIN — MODERNA COVID-19 VACCINE, BIVALENT 50 MCG: 25; 25 INJECTION, SUSPENSION INTRAMUSCULAR at 11:03

## 2023-08-25 RX ADMIN — PNEUMOCOCCAL VACCINE POLYVALENT 0.5 ML
25; 25; 25; 25; 25; 25; 25; 25; 25; 25; 25; 25; 25; 25; 25; 25; 25; 25; 25; 25; 25; 25; 25 INJECTION, SOLUTION INTRAMUSCULAR; SUBCUTANEOUS at 11:06

## 2023-08-25 ASSESSMENT — PAIN SCALES - GENERAL: PAINLEVEL: NO PAIN (0)

## 2023-08-25 NOTE — NURSING NOTE
Chief Complaint   Patient presents with    RECHECK     3 month follow up. B20.     /81 (BP Location: Right arm, Patient Position: Sitting, Cuff Size: Adult Regular)   Pulse 99   Wt 75.5 kg (166 lb 8 oz)   SpO2 94%   BMI 22.58 kg/m    Bárbara VAZQUEZ

## 2023-08-25 NOTE — LETTER
8/25/2023       RE: Kamron Headley  [address]  [facility] [address]     Dear Colleague,    Thank you for referring your patient, Kamron Headley, to the [facility] INFECTIOUS DISEASE CLINIC Welcome at [facility]. Please see a copy of my visit note below.    [facility]  General Infectious Diseases/HIV Progress Note     Patient:  Kamron Headley, Date of birth 1990  Medical record number [id number]  Date of Visit:  08/22/2023    Assessment and Plan:   1. HIV/AIDS, VL pending; CD4 214/24%               - VL 28,938; CD4 (autumn) 112/16% 2/24/23  - VL 36; CD4 163/23% 5/26/23  2. L lip ulcers, resolved w/ topical steroid               - Chronic inflammation on path, cultures negative, organism staining negative  3. Mild transaminitis - low level elevation persists, will continue to monitor  4. H/o HPV+ condyloma acuminatum (resected 2013)   - Working with CRS, monitoring CD4 count trajectory  5. Penile warts - resolved  6. Hepatitis B nonimmune, nonexposed   - Restarted immunization series with 4 doses   - Next dose ~11/2023 - will test for Ab response 1-2 months following last dose    Mr. Lundy is doing very well today. He has no new symptoms of concern, and his lip and penile lesions have resolved. He didn't get labs until after our visit today. CD4 is now 214. As such, will advise pt to discontinue bactrim for PJP ppx. I will also reach out to CRS given new data point. Thus far, he has demonstrated a 50 point increase every 3 months. Unclear if he will continue this trajectory, but he is at least >200 at this point, which is what I have been most concerned about.     Will try to get up to date on vaccines today too. He is due for Tdap and PCV20 and given the upcoming respiratory illness season, would like to give those today. We also discussed covid-19 boosting as it is starting to circulate early this year,  and his last booster was 1 year ago (when he was not being treated for HIV). Unfortunately, we do not have the new bivalent vaccine yet, so we decided to vaccinate/boost with the moderna vaccine he has already had. He should theoretically have a more robust response than previously, and theoretically better protection, though the variants are changing so quickly I'm not sure this will be the most effective vaccine for him. We discussed that he should return to clinic for the new booster when it is available, which he is very agreeable to doing.     Recommendations:  1. Continue biktarvy  2. Discontinue bactrim  3. Pt to stop by lab after visit. Will follow up with him with results via [employer].   4. RTC for new Covid booster when available.     Health care maintenance:  Vaccines due: Tdap, MenACWY, finish HPV series, shingrix, PCV20 - will give bolded vaccines today, as well as bivalent covid booster  Screening due: none    Follow up: Will decide based on CD4/labs obtained after visit. Either January with me, or November with a covering colleague.     I spent a total of 61 minutes on the day of the visit.   Time spent by me doing chart review, history and exam, documentation and further activities per the note    Madie Turner MD.   Pager [phone]  Infectious Diseases        Interval History:   -Last seen: 5/26/23  -Current HIV regimen: Biktarvy     -How many missed doses in 4 weeks: lyndonn  -Since the last visit:     Kamron continues to follow with Dermatology for his penile warts. He most recently underwent cryotherapy, and the warts have since totally resolved. They gave him topical imiquimod to try, but he notes the lesions resolved so he didn't start using it. The lip ulcer has resolved with topical steroid therapy as well. He has no new lesions.     Since our last visit, Kamron continued to discuss timing of anal wart removal with CRS and they recommended waiting until he has a CD4 count >300. I  "discussed this with Dr. Bender as well, and we agreed to wait for his next CD4 (today's lab that is currently pending) to see if he will continue to show CD4 recovery, or if 200 is a more reasonable goal.     Overall, Kamron is feeling well and feels that he is coming to terms with his diagnosis and life beyond it. He feels he is out of the \"shock\" phase, and is working on issues one at a time. The next big issue on his plate is the anal warts and he is eager to have these surgically taken care of. He denies any new infectious symptoms or recent infections. He moved into a new apartment in [place] and likes the location. He works from home, and has had a more stable schedule which has been helpful for him. He has a good view and is close to a gym. He is planning on going to [place] in September to visit some friends.     Review of Systems:Remaining systems all reviewed and negative.    Past Medical History:  Condylomata acuminatum     HIV:  -diagnosis: 2016  -previous ART regimens: none  -resistance: none 2/24/23     A. Vaccination/Serology status.   -Hepatitis A: Immune  -Hepatitis B: Currently in series, recommended 4 doses   5/26/23; 6/27/23; 7/25/23 - next ~11/2023  -Strep pneumo: due  -TDaP: last 2012, due  -Zoster: due (will wait until CD4 >200)  -Meningococcal: due (previously had menomune)  -Influenza   -COVID: repeat bivalent booster today  -Mpox: up to date     B. Routine Infectious Disease screening.   -Hepatitis C: negative (2023)   -Toxo IgG: negative (2023)  -CMV IGG: negative (2023)   -TB screen: negative quant gold 5/2023  -STD sceen: GC/CT: negative; RPR negative (2023);   -HLA-B*57-01: negative  -Resistance: pan-S (2/24/23)     C. Cardiovascular, renal and bone health.   -Cholesterol: wnl (2023)  -Blood pressure: 119/76     D. Cancer screening (if applicable)  -Pap smear: +HPV condyloma, awaiting colorectal surgery evaluation    Medications:  Current Outpatient Medications   Medication Sig Dispense " Refill    bictegravir-emtricitabine-tenofovir (BIKTARVY) -25 MG per tablet Take 1 tablet by mouth daily 30 tablet 1    clobetasol (TEMOVATE) 0.05 % external ointment Apply topically 2 times daily To ulcer as needed to help improve healing (Patient not taking: Reported on 5/26/2023) 30 g 1    FLUZONE QUADRIVALENT 0.5 ML injection       imiquimod (ALDARA) 5 % external cream Apply a small sized amount to warts or molluscum three times weekly at bedtime.   Wash off after 8 hours.   May use for up to 16 weeks. (Patient not taking: Reported on 5/26/2023) 12 packet 3    MODERNA COVID-19 BIVALENT 50 MCG/0.5ML injection       sulfamethoxazole-trimethoprim (BACTRIM DS) 800-160 MG tablet Take 1 tablet by mouth daily before breakfast 30 tablet 1       No Known Allergies    Immunizations:  Immunization History   Administered Date(s) Administered    COVID-19 Bivalent 18+ (Moderna) 12/17/2022    COVID-19 Monovalent 18+ (Moderna) 04/15/2021, 05/13/2021, 12/01/2021    DTAP (<7y) 05/03/1995    HEPA 08/22/2008, 10/29/2012    HIB (PRP-T) 03/22/1991, 05/17/1991, 09/16/1991    HPV9 02/16/2023    HepB 09/10/1999, 11/01/1999, 05/01/2000    Hepatitis B, Adult 05/26/2023, 06/27/2023, 07/25/2023    Influenza Vaccine >6 months (Alfuria,Fluzone) 12/17/2022    MMR 09/10/1999    Meningococcal (Menomune ) 08/22/2008    OPV, trivalent, live 05/03/1995    Smallpox/Mpox Vaccine Subcutaneous (JYNNEOS) 08/19/2022, 09/28/2022    TD,PF 7+ (Tenivac) 06/17/2002    TDAP Vaccine (Adacel) 10/29/2012       Exam:  /81 (BP Location: Right arm, Patient Position: Sitting, Cuff Size: Adult Regular)   Pulse 99   Wt 75.5 kg (166 lb 8 oz)   SpO2 94%   BMI 22.58 kg/m     Wt Readings from Last 2 Encounters:   05/26/23 76 kg (167 lb 8 oz)   05/01/23 80.2 kg (176 lb 12.8 oz)     Physical Examination:   GENERAL:  well-appearing. no acute distress.  HEAD:  Head is normocephalic, atraumatic   EYES:  Eyes have anicteric sclerae without conjunctival injection.  Pupils reactive bilaterally.   ENT:  Oropharynx is moist without exudates or ulcers. Tongue is midline. No sinus tenderness.   NECK:  Supple. No cervical lymphadenopathy  LUNGS: Normal WOB on RA.   SKIN:  No acute rashes.    EXTREMITIES: No joint erythema or swelling.   NEUROLOGIC:  Grossly nonfocal. Active x4 extremities. Alert. Oriented.          Laboratory Data:     Cd4 Total Cd4%   CD4% [place] T Cells   Date Value Ref Range Status   05/26/2023 23 (L) 28 - 63 % Final   02/16/2023 16 (L) 28 - 63 % Final   ( Absolute CD4, [place] T Cells   Date Value Ref Range Status   05/26/2023 163 (L) 441 - 2,156 cells/uL Final   02/16/2023 112 (L) 441 - 2,156 cells/uL Final   (       HIV-1 RNA Quantitative:  HIV-1 RNA Copies/mL, Instrument   Date Value Ref Range Status   05/26/2023 36 (H) <1 copies/mL Final   02/24/2023 28,938 (H) <1 copies/mL Final   02/16/2023 23,336 (H) <1 copies/mL Final     HIV-1 RNA copies/mL   Date Value Ref Range Status   03/25/2023 <20 (A) Not Detected Copies/mL Final     Metabolic Studies       Recent Labs   Lab Test 05/26/23  1216 02/24/23  1029    139   POTASSIUM 4.0 4.4   CHLORIDE 102 100   CO2 29 28   ANIONGAP 10 11   BUN 6.7 8.8   CR 0.73 0.57*   GFRESTIMATED >90 >90   GLC 89 96   DENISHA 9.9 10.0     Hepatic Studies    Recent Labs   Lab Test 05/26/23  1216 02/24/23  1029 02/16/23  1119   BILITOTAL 0.5 0.5 0.6   ALKPHOS 114 136* 152*   PROTTOTAL 8.1 8.7* 8.7*   ALBUMIN 4.8 4.8 4.7   AST 38 48 61*   ALT 67* 78* 107*     Hematology Studies      Recent Labs   Lab Test 05/26/23  1216 02/24/23  1029 02/16/23  1119   WBC 4.0 3.5* 3.3*   HGB 15.1 15.4 14.5   HCT 43.6 44.4 42.1    206 202         Again, thank you for allowing me to participate in the care of your patient.      Sincerely,    Madie Turner MD

## 2023-08-26 LAB
HIV1 RNA # PLAS NAA DL=20: NOT DETECTED COPIES/ML
QUANTIFERON MITOGEN: 10 IU/ML
QUANTIFERON NIL TUBE: 0.06 IU/ML
QUANTIFERON TB1 TUBE: 0.05 IU/ML
QUANTIFERON TB2 TUBE: 0.05

## 2023-08-27 LAB
GAMMA INTERFERON BACKGROUND BLD IA-ACNC: 0.06 IU/ML
M TB IFN-G BLD-IMP: NEGATIVE
M TB IFN-G CD4+ BCKGRND COR BLD-ACNC: 9.94 IU/ML
MITOGEN IGNF BCKGRD COR BLD-ACNC: -0.01 IU/ML
MITOGEN IGNF BCKGRD COR BLD-ACNC: -0.01 IU/ML

## 2023-08-30 DIAGNOSIS — B20 AIDS (ACQUIRED IMMUNODEFICIENCY SYNDROME), CD4 <=200 (H): ICD-10-CM

## 2023-09-26 NOTE — PROGRESS NOTES
Colon and Rectal Surgery Clinic Note    RE: Kamron Headley.  : 1990.  GILLIAN: 10/10/2023.    Reason for visit: anal warts and discuss HRA.    HPI: Kamron Headley is a 33 year old male who presents today with anal warts and to discuss HRA. He has a past medical history of HIV/AIDS (follows with Dr. Madie Turner in ID). He was previously seen by my colleague, Bárbara Olivo NP, for anal warts and advised Kamron that we wanted his CD4 counts to improve before undergoing surgery. His most recent CD4 count was 214.     Interval History: Overall he is doing well.  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  He had his last excision with Dr Greco in .  He had a mediocre experience.  He has not had anoreceptive intercourse since this time.  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  He finds them more socially debilitating than his HIV diagnosis (which was also very hard for him to deal with, but has been coming to terms with and now seems to be in a very good place emotionally).  He has had improving CD4 counts and seems to be quite adherent to his treatment.  He follows with Dr Turner in ID clinic.  He notes a lot of fear of anal cancer.  He did not know there were prevention methods available and he is interested in HRA.  He received one dose of the HPV vaccine but did not complete the full course.    T Cell subset profile (2023):  omponent Ref Range & Units      CD3% Total T Cells 49 - 84 % 62    Absolute CD3, Total T Cells 603 - 2,990 cells/uL 557 Low     CD4% [place] T Cells 28 - 63 % 24 Low     Absolute CD4, [place] T Cells 441 - 2,156 cells/uL 214 Low     CD8% Suppressor T Cells 10 - 40 % 34    Absolute CD8, Suppressor T Cells 125 - 1,312 cells/uL 306    CD4:CD8 Ratio 1.40 - 2.60 0.70 Low     T Cell Comment               Medications:  Current Outpatient Medications   Medication Sig Dispense Refill    bictegravir-emtricitabine-tenofovir (BIKTARVY) -25 MG per tablet Take 1  tablet by mouth daily 30 tablet 1    clobetasol (TEMOVATE) 0.05 % external ointment Apply topically 2 times daily To ulcer as needed to help improve healing (Patient not taking: Reported on 5/26/2023) 30 g 1    FLUZONE QUADRIVALENT 0.5 ML injection       imiquimod (ALDARA) 5 % external cream Apply a small sized amount to warts or molluscum three times weekly at bedtime.   Wash off after 8 hours.   May use for up to 16 weeks. (Patient not taking: Reported on 5/26/2023) 12 packet 3    MODERNA COVID-19 BIVALENT 50 MCG/0.5ML injection       sulfamethoxazole-trimethoprim (BACTRIM DS) 800-160 MG tablet Take 1 tablet by mouth daily before breakfast 30 tablet 1       ROS:  A complete review of systems was performed with the patient and all systems negative except as per HPI.    Physical Examination:  Exam was chaperoned by Dago Duran EMT-P  There were no vitals taken for this visit.  General: Well hydrated. No acute distress.  Abdomen: Soft, NT, not distended. No inguinal adenopathy palpated.  Perianal external examination:  Perianal skin: Intact with no excoriation or lichenification.  Lesions: There are nearly circumferential exophytic masses that appear to be verrucous in nature surrounding the anal verge; on closer inspection it seems that most of them are attached to actually rather this stalks and bases.  Eversion of buttocks: There was not evidence of an anal fissure. Details: I cannot see an anal fissure.  Skin tags or external hemorrhoids: see above.  Digital rectal examination: Was performed.   Sphincter tone: Good.  Palpable lesions: Yes - Location: there is an anterior mass.  Other: None.  Bimanual examination: was not performed.    Anoscopy: Was deferred    Procedures:  None today.    ASSESSMENT  34 y/o gentleman with HIV and large anal condyloma.     Risks, benefits, and alternatives of operative treatment were thoroughly discussed with the patient, he/she understands these well and agrees to  proceed.    PLAN  1. OR for excision of at least the largest of the condyloma - he states that the internal one bothers him as well as the largest external one  2. Discussed with Foster that he may require more that one trip to the OR for excision/fulguration; I discussed with him the risk of causing anal stenosis and he understands this and that we may have to wait for some of the scars to heal prior to attempting further resection/fulguration  3. Eventually he will need HRA but not until the condyloma are treated    45 minutes spent on the date of the encounter doing chart review, history and exam, imaging review, documentation and further activities as noted above.    Sal Bender MD, PhD    Division of Colon and Rectal Surgery  [facility]    Referring Provider:  No referring provider defined for this encounter.     Primary Care Provider:  Madie Turner

## 2023-10-10 ENCOUNTER — OFFICE VISIT (OUTPATIENT)
Dept: SURGERY | Facility: CLINIC | Age: 33
End: 2023-10-10
Payer: COMMERCIAL

## 2023-10-10 ENCOUNTER — HOSPITAL ENCOUNTER (OUTPATIENT)
Facility: AMBULATORY SURGERY CENTER | Age: 33
End: 2023-10-10
Attending: SURGERY | Admitting: SURGERY
Payer: COMMERCIAL

## 2023-10-10 ENCOUNTER — TELEPHONE (OUTPATIENT)
Dept: SURGERY | Facility: CLINIC | Age: 33
End: 2023-10-10

## 2023-10-10 VITALS
BODY MASS INDEX: 23.58 KG/M2 | HEIGHT: 72 IN | HEART RATE: 93 BPM | OXYGEN SATURATION: 96 % | DIASTOLIC BLOOD PRESSURE: 83 MMHG | SYSTOLIC BLOOD PRESSURE: 142 MMHG | WEIGHT: 174.1 LBS

## 2023-10-10 DIAGNOSIS — B97.7 HIGH RISK HPV INFECTION: ICD-10-CM

## 2023-10-10 DIAGNOSIS — A63.0 ANAL WARTS: Primary | ICD-10-CM

## 2023-10-10 DIAGNOSIS — Z21 HIV-1 (HUMAN IMMUNODEFICIENCY VIRUS I) (H): ICD-10-CM

## 2023-10-10 PROCEDURE — 99215 OFFICE O/P EST HI 40 MIN: CPT | Performed by: SURGERY

## 2023-10-10 ASSESSMENT — PAIN SCALES - GENERAL: PAINLEVEL: NO PAIN (0)

## 2023-10-10 NOTE — TELEPHONE ENCOUNTER
On 10/10/2023 at 10:47 AM and 10/10/2023 at 11:21 AM:  Spoke with patient via phone, he'd like to schedule his outpatient surgery with Dr. Sal Bender before the end of 2023, and soon, if possible. I explained that we will find out tomorrow whether Dr. Sal Bneder can get additional time on 10/25/2023. Patient would like that date if possible. I told patient that I'd call him as soon as I find out whether we can get time on 10/25/2023 - likely tomorrow morning, but possibly sooner.    Kizzy Waldron  Leanne-op Coordinator  [facility]  Direct Phone: [phone]

## 2023-10-10 NOTE — PATIENT INSTRUCTIONS
Follow up:    You can call Kizzy, our surgery scheduler, directly to start the scheduling process.    Appointment you will need in prep: pre op physical with our anesthesia team     NISSA Apodaca [phone]    Clinic Fax Number [phone]    Surgery Scheduling (Kizzy) [phone]    My Chart is available 24 hours a day and is a secure way to access your records and communicate with your care team.  I strongly recommend signing up if you haven't already done so, if you are comfortable with computers.  If you would like to inquire about this or are having problems with My Chart access, you may call [phone] or go online at [email or web address].  Please allow at least 24 hours for a response and extra time on weekends and Holidays.

## 2023-10-10 NOTE — LETTER
10/10/2023       RE: Kamron Headley  [address]  [facility] [address]     Dear Colleague,    Thank you for referring your patient, Kamron Headley, to the [facility] COLON AND RECTAL SURGERY CLINIC [facility]. Please see a copy of my visit note below.    Colon and Rectal Surgery Clinic Note    RE: Kamron Headley.  : 1990.  GILLIAN: 10/10/2023.    Reason for visit: anal warts and discuss HRA.    HPI: Kamron Headley is a 33 year old male who presents today with anal warts and to discuss HRA. He has a past medical history of HIV/AIDS (follows with Dr. Madie Turner in ID). He was previously seen by my colleague, Bárbara Olivo, WILNER, for anal warts and advised Kamron that we wanted his CD4 counts to improve before undergoing surgery. His most recent CD4 count was 214.     Interval History: Overall he is doing well.  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  He had his last excision with Dr Greco in .  He had a mediocre experience.  He has not had anoreceptive intercourse since this time.  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  He finds them more socially debilitating than his HIV diagnosis (which was also very hard for him to deal with, but has been coming to terms with and now seems to be in a very good place emotionally).  He has had improving CD4 counts and seems to be quite adherent to his treatment.  He follows with Dr Turner in ID clinic.  He notes a lot of fear of anal cancer.  He did not know there were prevention methods available and he is interested in HRA.  He received one dose of the HPV vaccine but did not complete the full course.    T Cell subset profile (2023):  omponent Ref Range & Units      CD3% Total T Cells 49 - 84 % 62    Absolute CD3, Total T Cells 603 - 2,990 cells/uL 557 Low     CD4% [place] T Cells 28 - 63 % 24 Low     Absolute CD4, [place] T Cells 441 - 2,156  cells/uL 214 Low     CD8% Suppressor T Cells 10 - 40 % 34    Absolute CD8, Suppressor T Cells 125 - 1,312 cells/uL 306    CD4:CD8 Ratio 1.40 - 2.60 0.70 Low     T Cell Comment               Medications:  Current Outpatient Medications   Medication Sig Dispense Refill    bictegravir-emtricitabine-tenofovir (BIKTARVY) -25 MG per tablet Take 1 tablet by mouth daily 30 tablet 1    clobetasol (TEMOVATE) 0.05 % external ointment Apply topically 2 times daily To ulcer as needed to help improve healing (Patient not taking: Reported on 5/26/2023) 30 g 1    FLUZONE QUADRIVALENT 0.5 ML injection       imiquimod (ALDARA) 5 % external cream Apply a small sized amount to warts or molluscum three times weekly at bedtime.   Wash off after 8 hours.   May use for up to 16 weeks. (Patient not taking: Reported on 5/26/2023) 12 packet 3    MODERNA COVID-19 BIVALENT 50 MCG/0.5ML injection       sulfamethoxazole-trimethoprim (BACTRIM DS) 800-160 MG tablet Take 1 tablet by mouth daily before breakfast 30 tablet 1       ROS:  A complete review of systems was performed with the patient and all systems negative except as per HPI.    Physical Examination:  Exam was chaperoned by DOE VasquezP  There were no vitals taken for this visit.  General: Well hydrated. No acute distress.  Abdomen: Soft, NT, not distended. No inguinal adenopathy palpated.  Perianal external examination:  Perianal skin: Intact with no excoriation or lichenification.  Lesions: There are nearly circumferential exophytic masses that appear to be verrucous in nature surrounding the anal verge; on closer inspection it seems that most of them are attached to actually rather this stalks and bases.  Eversion of buttocks: There was not evidence of an anal fissure. Details: I cannot see an anal fissure.  Skin tags or external hemorrhoids: see above.  Digital rectal examination: Was performed.   Sphincter tone: Good.  Palpable lesions: Yes - Location: there is an  anterior mass.  Other: None.  Bimanual examination: was not performed.    Anoscopy: Was deferred    Procedures:  None today.    ASSESSMENT  32 y/o gentleman with HIV and large anal condyloma.     Risks, benefits, and alternatives of operative treatment were thoroughly discussed with the patient, he/she understands these well and agrees to proceed.    PLAN  1. OR for excision of at least the largest of the condyloma - he states that the internal one bothers him as well as the largest external one  2. Discussed with Foster that he may require more that one trip to the OR for excision/fulguration; I discussed with him the risk of causing anal stenosis and he understands this and that we may have to wait for some of the scars to heal prior to attempting further resection/fulguration  3. Eventually he will need HRA but not until the condyloma are treated    45 minutes spent on the date of the encounter doing chart review, history and exam, imaging review, documentation and further activities as noted above.      Referring Provider:  No referring provider defined for this encounter.     Primary Care Provider:  Madie Turner      Again, thank you for allowing me to participate in the care of your patient.      Sincerely,    Sal Bender MD

## 2023-10-10 NOTE — NURSING NOTE
Chief Complaint   Patient presents with    Follow Up     Wart check and discuss HRA       Vitals:    10/10/23 0858   BP: (!) 142/83   BP Location: Left arm   Patient Position: Sitting   Cuff Size: Adult Regular   Pulse: 93   SpO2: 96%   Weight: 174 lb 1.6 oz   Height: 6'       Body mass index is 23.61 kg/m .    Mary Hoffmann, CMA

## 2023-10-11 NOTE — CONFIDENTIAL NOTE
ON 10/11/2023 at 5:06 PM    Patient is scheduled for surgery with Dr. Sal Bender     Spoke with: Kamron    Date of Surgery: 10/25/2023    Location: [facility] OR    Informed patient they will need an adult  YES    Pre op with Provider Dr. Sal Bender     H&P: Scheduled with PAC on 10/20/2023 at 8:00 AM    Post-op appt scheduled with Tran Cannon PA-C, on 11/10/2023 at 9:30 AM    Surgery packet: sent via [employer], per patient    Additional comments: wait list request 10/25/2023 at [facility] OR granted    Kizzy Waldron  Leanne-op Coordinator  [facility]  Direct Phone: [phone]

## 2023-10-12 NOTE — TELEPHONE ENCOUNTER
FUTURE VISIT INFORMATION      SURGERY INFORMATION:  Date: 10/25/23  [facility]  Surgeon:  Sal Bender MD   Anesthesia Type:  MAC  Procedure: exam under anesthesia, excision of anal condyloma   Consult: ov 10/10    RECORDS REQUESTED FROM:       Primary Care Provider:Digital Chocolateth

## 2023-10-16 ENCOUNTER — TELEPHONE (OUTPATIENT)
Dept: SURGERY | Facility: CLINIC | Age: 33
End: 2023-10-16
Payer: COMMERCIAL

## 2023-10-16 NOTE — TELEPHONE ENCOUNTER
[facility] Call Center    Phone Message    May a detailed message be left on voicemail: yes     Reason for Call: Form or Letter   Type or form/letter needing completion: Disability Form  Provider: DR Bender  Date form needed: ASAP  Once completed: Fax form to: [phone]- they will fax form over     Action Taken: Message routed to:  [facility] ([facility] colon and rectal     Travel Screening: Not Applicable

## 2023-10-20 ENCOUNTER — OFFICE VISIT (OUTPATIENT)
Dept: SURGERY | Facility: CLINIC | Age: 33
End: 2023-10-20
Payer: COMMERCIAL

## 2023-10-20 ENCOUNTER — ANESTHESIA EVENT (OUTPATIENT)
Dept: SURGERY | Facility: AMBULATORY SURGERY CENTER | Age: 33
End: 2023-10-20
Payer: COMMERCIAL

## 2023-10-20 ENCOUNTER — PRE VISIT (OUTPATIENT)
Dept: SURGERY | Facility: CLINIC | Age: 33
End: 2023-10-20

## 2023-10-20 VITALS
WEIGHT: 173 LBS | HEIGHT: 72 IN | BODY MASS INDEX: 23.43 KG/M2 | TEMPERATURE: 98.2 F | DIASTOLIC BLOOD PRESSURE: 76 MMHG | OXYGEN SATURATION: 97 % | HEART RATE: 86 BPM | SYSTOLIC BLOOD PRESSURE: 113 MMHG | RESPIRATION RATE: 16 BRPM

## 2023-10-20 DIAGNOSIS — Z01.818 PREOP EXAMINATION: Primary | ICD-10-CM

## 2023-10-20 DIAGNOSIS — A63.0 ANAL WARTS: ICD-10-CM

## 2023-10-20 PROCEDURE — 99203 OFFICE O/P NEW LOW 30 MIN: CPT | Performed by: PHYSICIAN ASSISTANT

## 2023-10-20 ASSESSMENT — LIFESTYLE VARIABLES: TOBACCO_USE: 0

## 2023-10-20 ASSESSMENT — PAIN SCALES - GENERAL: PAINLEVEL: NO PAIN (0)

## 2023-10-20 ASSESSMENT — ENCOUNTER SYMPTOMS: SEIZURES: 0

## 2023-10-20 NOTE — PATIENT INSTRUCTIONS
Preparing for Your Surgery      Name:  Kamron Headley   MRN:  [id number]   :  1990   Today's Date:  10/20/2023         Arriving for surgery:  Surgery date:  10/25/2023  Arrival time:  7:00 am    Restrictions due to COVID 19:    Please maintain social distance.  Masking is optional.      parking is available for anyone with mobility limitations or disabilities. (Monday- Friday 7 am- 5 pm)    Please come to:    [facility]  [address] [place] [phone]    Please check in on the 5th floor at the Ambulatory Surgery Center.      What can I eat or drink?    -  You may eat and drink normally until 8 hours prior to arrival  time. (Until Midnight)  -  You may have clear liquids until 2 hours prior to arrival  time. (Until 5 am)    Examples of clear liquids:  Water  Clear broth  Juices (apple, white grape, white cranberry  and cider) without pulp  Noncarbonated, powder based beverages  (lemonade and Ravinder-Aid)  Sodas (Sprite, 7-Up, ginger ale and seltzer)  Coffee or tea (without milk or cream)  Gatorade      Which medicines can I take?    Hold Aspirin for 7 days before surgery.   Hold Multivitamins for 7 days before surgery.  Hold Supplements for 7 days before surgery.  Hold Ibuprofen (Advil, Motrin) for 1 day before surgery--unless otherwise directed by surgeon.  Hold Naproxen (Aleve) for 4 days before surgery.    No alcohol or cannabis products for 24 hours prior to procedure.      -  PLEASE TAKE the following medications the day of surgery:   Biktarvy       How do I prepare myself?  - Please take 2 showers (one the night prior to surgery and one the morning of surgery) using Scrubcare or Hibiclens soap.    Use this soap only from the neck to your toes.     Leave the soap on your skin for one minute--then rinse thoroughly.      You may use your own shampoo and conditioner. No other hair products.   - Please remove all jewelry and body piercings.  - No lotions, deodorants  or fragrance.  - No makeup or fingernail polish.   - Bring your ID and insurance card.    -If you have a Deep Brain Stimulator, a Spinal Cord Stimulator, or any implanted Neuro Device, you must bring the remote to the Surgery Center.         ALL PATIENTS ARE REQUIRED TO HAVE A RESPONSIBLE ADULT TO DRIVE AND BE IN ATTENDANCE WITH THEM FOR 24 HOURS FOLLOWING SURGERY.     Covid testing policy as of 12/06/2022  Your surgeon will notify and schedule you for a COVID test if one is needed before surgery--please direct any questions or COVID symptoms to your surgeon      Questions or Concerns:    -For questions regarding the day of surgery, please contact the Ambulatory Surgery Center at [phone].    -If you have health changes between today and your surgery, please contact your surgeon.     For any medical questions or concerns regarding medications, bowel prep, or FMLA paperwork, please contact:     Constanza RN: [phone]     - For questions after surgery, please contact your surgeon's office.

## 2023-10-20 NOTE — H&P
Pre-Operative H & P     CC:  Preoperative exam to assess for increased cardiopulmonary risk while undergoing surgery and anesthesia.    Date of Encounter: 10/20/2023  Primary Care Physician:  Madie Turner     Reason for visit:   Encounter Diagnoses   Name Primary?    Anal warts Yes    Preop examination        HPI  Kamron Headley is a 33 year old male who presents for pre-operative H & P in preparation for  Procedure Information       Case: [id number] Date/Time: 10/25/23 0825    Procedure: exam under anesthesia, excision of anal condyloma (Anus)    Anesthesia type: MAC    Diagnosis: Anal warts [A63.0]    Pre-op diagnosis: Anal warts [A63.0]    Location: [place] [address] / [facility]    Providers: Sal Bender MD            Patient is being evaluated for comorbid conditions of HIV.     Mr. Headley was recently evaluated by Bárbara Olivo NP, for anal warts. He had a prior excision with Dr Greco in 2013. He now presents for the above procedure.      History is obtained from the patient and chart review    Hx of abnormal bleeding or anti-platelet use: denies      Past Medical History  Past Medical History:   Diagnosis Date    Anal warts     Human immunodeficiency virus (HIV) disease (H)        Past Surgical History  Past Surgical History:   Procedure Laterality Date    DENTAL SURGERY      wisdom teeth    EXAM UNDER ANESTHESIA, FULGURATE CONDYLOMA ANUS, COMBINED  05/30/2013    Procedure: COMBINED EXAM UNDER ANESTHESIA, FULGURATE CONDYLOMA ANUS;  Exam Under Anesthesia of Anus, Excision and Fulguration of Anal Condyloma;  Surgeon: Matias Lopez MD;  Location:  OR       Prior to Admission Medications  Current Outpatient Medications   Medication Sig Dispense Refill    bictegravir-emtricitabine-tenofovir (BIKTARVY) -25 MG per tablet Take 1 tablet by mouth daily (Patient taking differently: Take 1 tablet by mouth every morning) 30  tablet 1    FLUZONE QUADRIVALENT 0.5 ML injection       imiquimod (ALDARA) 5 % external cream Apply a small sized amount to warts or molluscum three times weekly at bedtime.   Wash off after 8 hours.   May use for up to 16 weeks. (Patient not taking: Reported on 5/26/2023) 12 packet 3    MODERNA COVID-19 BIVALENT 50 MCG/0.5ML injection          Allergies  No Known Allergies    Social History  Social History     Socioeconomic History    Marital status: Single     Spouse name: Not on file    Number of children: Not on file    Years of education: Not on file    Highest education level: Not on file   Occupational History    Not on file   Tobacco Use    Smoking status: Never    Smokeless tobacco: Never   Vaping Use    Vaping Use: Never used   Substance and Sexual Activity    Alcohol use: No    Drug use: No    Sexual activity: Yes     Partners: Male     Birth control/protection: Condom   Other Topics Concern    Parent/sibling w/ CABG, MI or angioplasty before 65F 55M? No   Social History Narrative    Not on file     Social Determinants of Health     Financial Resource Strain: Not on file   Food Insecurity: Not on file   Transportation Needs: Not on file   Physical Activity: Not on file   Stress: Not on file   Social Connections: Not on file   Interpersonal Safety: Not on file   Housing Stability: Not on file       Family History  Family History   Problem Relation Age of Onset    Cancer Paternal Grandmother     Family History Negative No family hx of         no skin disorders    Anesthesia Reaction No family hx of     Deep Vein Thrombosis (DVT) No family hx of        Review of Systems  The complete review of systems is negative other than noted in the HPI or here.   Anesthesia Evaluation   Pt has had prior anesthetic.     No history of anesthetic complications       ROS/MED HX  ENT/Pulmonary:    (-) tobacco use, asthma, sleep apnea and recent URI   Neurologic:  - neg neurologic ROS  (-) no seizures and no CVA    Cardiovascular:  - neg cardiovascular ROS     METS/Exercise Tolerance: >4 METS    Hematologic:  - neg hematologic  ROS  (-) history of blood clots and history of blood transfusion   Musculoskeletal:  - neg musculoskeletal ROS     GI/Hepatic: Comment: Anal warts      (+)           hepatitis (LFTs WNL) type C,     (-) GERD and liver disease   Renal/Genitourinary:  - neg Renal ROS  (-) renal disease   Endo:  - neg endo ROS  (-) Type II DM   Psychiatric/Substance Use:  - neg psychiatric ROS     Infectious Disease: Comment: Anal warts      (+)     HIV,       Malignancy:  - neg malignancy ROS     Other:  - neg other ROS          /76 (BP Location: Right arm, Patient Position: Sitting, Cuff Size: Adult Large)   Pulse 86   Temp 98.2  F (36.8  C) (Oral)   Resp 16   Ht 1.829 m (6')   Wt 78.5 kg (173 lb)   SpO2 97%   BMI 23.46 kg/m      Physical Exam  Constitutional: Awake, alert, cooperative, no apparent distress, and appears stated age.  Eyes: Pupils equal, round and reactive to light, extra ocular muscles intact, sclera clear, conjunctiva normal.  HENT: Normocephalic, oral pharynx with moist mucus membranes, good dentition. No goiter appreciated. No removable dental hardware.  Respiratory: Clear to auscultation bilaterally, no crackles or wheezing. No SOB when supine.  Cardiovascular: Regular rate and rhythm, normal S1 and S2, and no murmur noted.  Carotids +2, no bruits. No edema. Palpable pulses to radial, DP and PT arteries.   GI: Normal bowel sounds, soft, non-distended, non-tender, no masses palpated.    Lymph/Hematologic: No cervical lymphadenopathy and no supraclavicular lymphadenopathy.  Genitourinary:  deferred  Skin: Warm and dry.  No rashes.   Musculoskeletal: Full ROM of neck. There is no redness, warmth, or swelling of the joints. Gross motor strength is normal.    Neurologic: Awake, alert, oriented to name, place and time. Cranial nerves II-XII are grossly intact. Gait is normal. Ambulates from  chair to exam table, seats self, lies supine and sits back up w/o assistance.  Neuropsychiatric: Calm, cooperative. Normal affect. Pleasant. Answers questions appropriately, follows commands w/o difficulty.        PRIOR LABS/DIAGNOSTIC STUDIES:    All labs and imaging personally reviewed        The patient's records and results personally reviewed by this provider.       Assessment    Kamron Headley is a 33 year old male seen as a PAC referral for risk assessment and optimization for anesthesia.    Plan/Recommendations  Pt will be optimized for the proposed procedure.  See below for details on the assessment, risk, and preoperative recommendations    NEUROLOGY  - No history of TIA, CVA or seizure    -Post Op delirium risk factors:  No risk identified    ENT  - No current airway concerns.  Will need to be reassessed day of surgery.  Mallampati: II  TM: > 3    CARDIAC  - No history of CAD, Hypertension, and Afib  - METS (Metabolic Equivalents)  Patient performs 4 or more METS exercise without symptoms            Total Score: 0      RCRI-Very low risk: Class 1 0.4% complication rate            Total Score: 0        PULMONARY  LISBETH Low Risk            Total Score: 1    LISBETH: Male      - Denies asthma or inhaler use  - Tobacco History    History   Smoking Status    Never   Smokeless Tobacco    Never       GI  - Denies GERD  - Chronic Hep C. LFTs WNL.    PONV Medium Risk  Total Score: 2           1 AN PONV: Patient is not a current smoker    1 AN PONV: Intended Post Op Opioids          ENDOCRINE    - BMI: Estimated body mass index is 23.46 kg/m  as calculated from the following:    Height as of this encounter: 1.829 m (6').    Weight as of this encounter: 78.5 kg (173 lb).  Healthy Weight (BMI 18.5-24.9)  - No history of Diabetes Mellitus    HEME/IMMUNE  VTE Low Risk 0.5%            Total Score: 2    VTE: Male      - No history of abnormal bleeding or antiplatelet use.  - HIV, continue Biktarvy  - Anal warts        The  patient is aware that the final anesthesia plan will be decided by the assigned anesthesia provider on the date of service.      The patient is optimized for their procedure. AVS with information on surgery time/arrival time, meds and NPO status given by nursing staff. No further diagnostic testing indicated.      On the day of service:     Prep time: 12 minutes  Visit time: 19 minutes  Documentation time: 10 minutes  ------------------------------------------  Total time: 41 minutes      Desiree Amaral PA-C  Preoperative Assessment Center  [facility]  Clinic and Surgery Center  Phone: [phone]  Fax: [phone]

## 2023-10-20 NOTE — H&P (VIEW-ONLY)
Pre-Operative H & P     CC:  Preoperative exam to assess for increased cardiopulmonary risk while undergoing surgery and anesthesia.    Date of Encounter: 10/20/2023  Primary Care Physician:  Madie Turner     Reason for visit:   Encounter Diagnoses   Name Primary?    Anal warts Yes    Preop examination        HPI  Kamron Headley is a 33 year old male who presents for pre-operative H & P in preparation for  Procedure Information       Case: [id number] Date/Time: 10/25/23 0825    Procedure: exam under anesthesia, excision of anal condyloma (Anus)    Anesthesia type: MAC    Diagnosis: Anal warts [A63.0]    Pre-op diagnosis: Anal warts [A63.0]    Location: [place] [address] / [facility]    Providers: Sal Bender MD            Patient is being evaluated for comorbid conditions of HIV.     Mr. Headley was recently evaluated by Bárbara Olivo NP, for anal warts. He had a prior excision with Dr Greco in 2013. He now presents for the above procedure.      History is obtained from the patient and chart review    Hx of abnormal bleeding or anti-platelet use: denies      Past Medical History  Past Medical History:   Diagnosis Date    Anal warts     Human immunodeficiency virus (HIV) disease (H)        Past Surgical History  Past Surgical History:   Procedure Laterality Date    DENTAL SURGERY      wisdom teeth    EXAM UNDER ANESTHESIA, FULGURATE CONDYLOMA ANUS, COMBINED  05/30/2013    Procedure: COMBINED EXAM UNDER ANESTHESIA, FULGURATE CONDYLOMA ANUS;  Exam Under Anesthesia of Anus, Excision and Fulguration of Anal Condyloma;  Surgeon: Matias Lopez MD;  Location:  OR       Prior to Admission Medications  Current Outpatient Medications   Medication Sig Dispense Refill    bictegravir-emtricitabine-tenofovir (BIKTARVY) -25 MG per tablet Take 1 tablet by mouth daily (Patient taking differently: Take 1 tablet by mouth every morning) 30  tablet 1    FLUZONE QUADRIVALENT 0.5 ML injection       imiquimod (ALDARA) 5 % external cream Apply a small sized amount to warts or molluscum three times weekly at bedtime.   Wash off after 8 hours.   May use for up to 16 weeks. (Patient not taking: Reported on 5/26/2023) 12 packet 3    MODERNA COVID-19 BIVALENT 50 MCG/0.5ML injection          Allergies  No Known Allergies    Social History  Social History     Socioeconomic History    Marital status: Single     Spouse name: Not on file    Number of children: Not on file    Years of education: Not on file    Highest education level: Not on file   Occupational History    Not on file   Tobacco Use    Smoking status: Never    Smokeless tobacco: Never   Vaping Use    Vaping Use: Never used   Substance and Sexual Activity    Alcohol use: No    Drug use: No    Sexual activity: Yes     Partners: Male     Birth control/protection: Condom   Other Topics Concern    Parent/sibling w/ CABG, MI or angioplasty before 65F 55M? No   Social History Narrative    Not on file     Social Determinants of Health     Financial Resource Strain: Not on file   Food Insecurity: Not on file   Transportation Needs: Not on file   Physical Activity: Not on file   Stress: Not on file   Social Connections: Not on file   Interpersonal Safety: Not on file   Housing Stability: Not on file       Family History  Family History   Problem Relation Age of Onset    Cancer Paternal Grandmother     Family History Negative No family hx of         no skin disorders    Anesthesia Reaction No family hx of     Deep Vein Thrombosis (DVT) No family hx of        Review of Systems  The complete review of systems is negative other than noted in the HPI or here.   Anesthesia Evaluation   Pt has had prior anesthetic.     No history of anesthetic complications       ROS/MED HX  ENT/Pulmonary:    (-) tobacco use, asthma, sleep apnea and recent URI   Neurologic:  - neg neurologic ROS  (-) no seizures and no CVA    Cardiovascular:  - neg cardiovascular ROS     METS/Exercise Tolerance: >4 METS    Hematologic:  - neg hematologic  ROS  (-) history of blood clots and history of blood transfusion   Musculoskeletal:  - neg musculoskeletal ROS     GI/Hepatic: Comment: Anal warts      (+)           hepatitis (LFTs WNL) type C,     (-) GERD and liver disease   Renal/Genitourinary:  - neg Renal ROS  (-) renal disease   Endo:  - neg endo ROS  (-) Type II DM   Psychiatric/Substance Use:  - neg psychiatric ROS     Infectious Disease: Comment: Anal warts      (+)     HIV,       Malignancy:  - neg malignancy ROS     Other:  - neg other ROS          /76 (BP Location: Right arm, Patient Position: Sitting, Cuff Size: Adult Large)   Pulse 86   Temp 98.2  F (36.8  C) (Oral)   Resp 16   Ht 1.829 m (6')   Wt 78.5 kg (173 lb)   SpO2 97%   BMI 23.46 kg/m      Physical Exam  Constitutional: Awake, alert, cooperative, no apparent distress, and appears stated age.  Eyes: Pupils equal, round and reactive to light, extra ocular muscles intact, sclera clear, conjunctiva normal.  HENT: Normocephalic, oral pharynx with moist mucus membranes, good dentition. No goiter appreciated. No removable dental hardware.  Respiratory: Clear to auscultation bilaterally, no crackles or wheezing. No SOB when supine.  Cardiovascular: Regular rate and rhythm, normal S1 and S2, and no murmur noted.  Carotids +2, no bruits. No edema. Palpable pulses to radial, DP and PT arteries.   GI: Normal bowel sounds, soft, non-distended, non-tender, no masses palpated.    Lymph/Hematologic: No cervical lymphadenopathy and no supraclavicular lymphadenopathy.  Genitourinary:  deferred  Skin: Warm and dry.  No rashes.   Musculoskeletal: Full ROM of neck. There is no redness, warmth, or swelling of the joints. Gross motor strength is normal.    Neurologic: Awake, alert, oriented to name, place and time. Cranial nerves II-XII are grossly intact. Gait is normal. Ambulates from  chair to exam table, seats self, lies supine and sits back up w/o assistance.  Neuropsychiatric: Calm, cooperative. Normal affect. Pleasant. Answers questions appropriately, follows commands w/o difficulty.        PRIOR LABS/DIAGNOSTIC STUDIES:    All labs and imaging personally reviewed        The patient's records and results personally reviewed by this provider.       Assessment    Kamron Headley is a 33 year old male seen as a PAC referral for risk assessment and optimization for anesthesia.    Plan/Recommendations  Pt will be optimized for the proposed procedure.  See below for details on the assessment, risk, and preoperative recommendations    NEUROLOGY  - No history of TIA, CVA or seizure    -Post Op delirium risk factors:  No risk identified    ENT  - No current airway concerns.  Will need to be reassessed day of surgery.  Mallampati: II  TM: > 3    CARDIAC  - No history of CAD, Hypertension, and Afib  - METS (Metabolic Equivalents)  Patient performs 4 or more METS exercise without symptoms            Total Score: 0      RCRI-Very low risk: Class 1 0.4% complication rate            Total Score: 0        PULMONARY  LISBETH Low Risk            Total Score: 1    LISBETH: Male      - Denies asthma or inhaler use  - Tobacco History    History   Smoking Status    Never   Smokeless Tobacco    Never       GI  - Denies GERD  - Chronic Hep C. LFTs WNL.    PONV Medium Risk  Total Score: 2           1 AN PONV: Patient is not a current smoker    1 AN PONV: Intended Post Op Opioids          ENDOCRINE    - BMI: Estimated body mass index is 23.46 kg/m  as calculated from the following:    Height as of this encounter: 1.829 m (6').    Weight as of this encounter: 78.5 kg (173 lb).  Healthy Weight (BMI 18.5-24.9)  - No history of Diabetes Mellitus    HEME/IMMUNE  VTE Low Risk 0.5%            Total Score: 2    VTE: Male      - No history of abnormal bleeding or antiplatelet use.  - HIV, continue Biktarvy  - Anal warts        The  patient is aware that the final anesthesia plan will be decided by the assigned anesthesia provider on the date of service.      The patient is optimized for their procedure. AVS with information on surgery time/arrival time, meds and NPO status given by nursing staff. No further diagnostic testing indicated.      On the day of service:     Prep time: 12 minutes  Visit time: 19 minutes  Documentation time: 10 minutes  ------------------------------------------  Total time: 41 minutes      Desiree Amaral PA-C  Preoperative Assessment Center  [facility]  Phone: [phone]  Fax: [phone]

## 2023-10-25 ENCOUNTER — HOSPITAL ENCOUNTER (OUTPATIENT)
Facility: AMBULATORY SURGERY CENTER | Age: 33
Discharge: HOME | End: 2023-10-25
Attending: SURGERY
Payer: COMMERCIAL

## 2023-10-25 ENCOUNTER — ANESTHESIA (OUTPATIENT)
Dept: SURGERY | Facility: AMBULATORY SURGERY CENTER | Age: 33
End: 2023-10-25
Payer: COMMERCIAL

## 2023-10-25 VITALS
BODY MASS INDEX: 23.03 KG/M2 | TEMPERATURE: 97 F | HEIGHT: 72 IN | WEIGHT: 170 LBS | RESPIRATION RATE: 18 BRPM | OXYGEN SATURATION: 100 % | SYSTOLIC BLOOD PRESSURE: 123 MMHG | DIASTOLIC BLOOD PRESSURE: 83 MMHG | HEART RATE: 81 BPM

## 2023-10-25 DIAGNOSIS — A63.0 ANAL WARTS: Primary | ICD-10-CM

## 2023-10-25 PROCEDURE — 88305 TISSUE EXAM BY PATHOLOGIST: CPT | Mod: TC | Performed by: SURGERY

## 2023-10-25 PROCEDURE — 88305 TISSUE EXAM BY PATHOLOGIST: CPT | Mod: 26 | Performed by: PATHOLOGY

## 2023-10-25 PROCEDURE — 46924 DESTRUCTION ANAL LESION(S): CPT

## 2023-10-25 RX ORDER — ONDANSETRON 4 MG/1
4 TABLET, ORALLY DISINTEGRATING ORAL EVERY 30 MIN PRN
Status: DISCONTINUED | OUTPATIENT
Start: 2023-10-25 | End: 2023-10-26 | Stop reason: HOSPADM

## 2023-10-25 RX ORDER — ACETAMINOPHEN 325 MG/1
975 TABLET ORAL ONCE
Status: COMPLETED | OUTPATIENT
Start: 2023-10-25 | End: 2023-10-25

## 2023-10-25 RX ORDER — SODIUM CHLORIDE, SODIUM LACTATE, POTASSIUM CHLORIDE, CALCIUM CHLORIDE 600; 310; 30; 20 MG/100ML; MG/100ML; MG/100ML; MG/100ML
INJECTION, SOLUTION INTRAVENOUS CONTINUOUS PRN
Status: DISCONTINUED | OUTPATIENT
Start: 2023-10-25 | End: 2023-10-25

## 2023-10-25 RX ORDER — SODIUM CHLORIDE, SODIUM LACTATE, POTASSIUM CHLORIDE, CALCIUM CHLORIDE 600; 310; 30; 20 MG/100ML; MG/100ML; MG/100ML; MG/100ML
INJECTION, SOLUTION INTRAVENOUS CONTINUOUS
Status: DISCONTINUED | OUTPATIENT
Start: 2023-10-25 | End: 2023-10-25 | Stop reason: HOSPADM

## 2023-10-25 RX ORDER — OXYCODONE HYDROCHLORIDE 5 MG/1
5 TABLET ORAL
Status: DISCONTINUED | OUTPATIENT
Start: 2023-10-25 | End: 2023-10-26 | Stop reason: HOSPADM

## 2023-10-25 RX ORDER — FENTANYL CITRATE 50 UG/ML
INJECTION, SOLUTION INTRAMUSCULAR; INTRAVENOUS PRN
Status: DISCONTINUED | OUTPATIENT
Start: 2023-10-25 | End: 2023-10-25

## 2023-10-25 RX ORDER — SILVER SULFADIAZINE 10 MG/G
CREAM TOPICAL DAILY
Qty: 50 G | Refills: 1 | Status: SHIPPED | OUTPATIENT
Start: 2023-10-25 | End: 2024-04-16

## 2023-10-25 RX ORDER — ONDANSETRON 2 MG/ML
4 INJECTION INTRAMUSCULAR; INTRAVENOUS EVERY 30 MIN PRN
Status: DISCONTINUED | OUTPATIENT
Start: 2023-10-25 | End: 2023-10-26 | Stop reason: HOSPADM

## 2023-10-25 RX ORDER — CEFAZOLIN SODIUM 2 G/50ML
2 SOLUTION INTRAVENOUS SEE ADMIN INSTRUCTIONS
Status: DISCONTINUED | OUTPATIENT
Start: 2023-10-25 | End: 2023-10-25 | Stop reason: HOSPADM

## 2023-10-25 RX ORDER — LIDOCAINE HYDROCHLORIDE 20 MG/ML
INJECTION, SOLUTION INFILTRATION; PERINEURAL PRN
Status: DISCONTINUED | OUTPATIENT
Start: 2023-10-25 | End: 2023-10-25

## 2023-10-25 RX ORDER — LIDOCAINE 40 MG/G
CREAM TOPICAL
Status: DISCONTINUED | OUTPATIENT
Start: 2023-10-25 | End: 2023-10-25 | Stop reason: HOSPADM

## 2023-10-25 RX ORDER — METRONIDAZOLE 500 MG/100ML
500 INJECTION, SOLUTION INTRAVENOUS
Status: COMPLETED | OUTPATIENT
Start: 2023-10-25 | End: 2023-10-25

## 2023-10-25 RX ORDER — PROPOFOL 10 MG/ML
INJECTION, EMULSION INTRAVENOUS CONTINUOUS PRN
Status: DISCONTINUED | OUTPATIENT
Start: 2023-10-25 | End: 2023-10-25

## 2023-10-25 RX ORDER — ONDANSETRON 2 MG/ML
INJECTION INTRAMUSCULAR; INTRAVENOUS PRN
Status: DISCONTINUED | OUTPATIENT
Start: 2023-10-25 | End: 2023-10-25

## 2023-10-25 RX ORDER — OXYCODONE HYDROCHLORIDE 5 MG/1
10 TABLET ORAL
Status: DISCONTINUED | OUTPATIENT
Start: 2023-10-25 | End: 2023-10-26 | Stop reason: HOSPADM

## 2023-10-25 RX ORDER — POLYETHYLENE GLYCOL 3350 17 G/17G
17 POWDER, FOR SOLUTION ORAL DAILY
Qty: 510 G | Refills: 1 | Status: SHIPPED | OUTPATIENT
Start: 2023-10-25 | End: 2023-11-01

## 2023-10-25 RX ORDER — OXYCODONE HYDROCHLORIDE 5 MG/1
5-10 TABLET ORAL EVERY 4 HOURS PRN
Qty: 20 TABLET | Refills: 0 | Status: SHIPPED | OUTPATIENT
Start: 2023-10-25 | End: 2024-01-04

## 2023-10-25 RX ORDER — ACETAMINOPHEN 325 MG/1
650 TABLET ORAL
Status: DISCONTINUED | OUTPATIENT
Start: 2023-10-25 | End: 2023-10-26 | Stop reason: HOSPADM

## 2023-10-25 RX ORDER — BUPIVACAINE HYDROCHLORIDE 2.5 MG/ML
INJECTION, SOLUTION EPIDURAL; INFILTRATION; INTRACAUDAL PRN
Status: DISCONTINUED | OUTPATIENT
Start: 2023-10-25 | End: 2023-10-25 | Stop reason: HOSPADM

## 2023-10-25 RX ORDER — CEFAZOLIN SODIUM 2 G/50ML
2 SOLUTION INTRAVENOUS
Status: DISCONTINUED | OUTPATIENT
Start: 2023-10-25 | End: 2023-10-25 | Stop reason: HOSPADM

## 2023-10-25 RX ORDER — KETAMINE HYDROCHLORIDE 10 MG/ML
INJECTION INTRAMUSCULAR; INTRAVENOUS PRN
Status: DISCONTINUED | OUTPATIENT
Start: 2023-10-25 | End: 2023-10-25

## 2023-10-25 RX ORDER — ONDANSETRON 2 MG/ML
4 INJECTION INTRAMUSCULAR; INTRAVENOUS ONCE
Status: COMPLETED | OUTPATIENT
Start: 2023-10-25 | End: 2023-10-25

## 2023-10-25 RX ADMIN — KETAMINE HYDROCHLORIDE 10 MG: 10 INJECTION INTRAMUSCULAR; INTRAVENOUS at 09:17

## 2023-10-25 RX ADMIN — KETAMINE HYDROCHLORIDE 10 MG: 10 INJECTION INTRAMUSCULAR; INTRAVENOUS at 09:21

## 2023-10-25 RX ADMIN — ONDANSETRON 4 MG: 2 INJECTION INTRAMUSCULAR; INTRAVENOUS at 07:54

## 2023-10-25 RX ADMIN — SODIUM CHLORIDE, SODIUM LACTATE, POTASSIUM CHLORIDE, CALCIUM CHLORIDE: 600; 310; 30; 20 INJECTION, SOLUTION INTRAVENOUS at 08:28

## 2023-10-25 RX ADMIN — FENTANYL CITRATE 25 MCG: 50 INJECTION, SOLUTION INTRAMUSCULAR; INTRAVENOUS at 09:17

## 2023-10-25 RX ADMIN — SODIUM CHLORIDE, SODIUM LACTATE, POTASSIUM CHLORIDE, CALCIUM CHLORIDE: 600; 310; 30; 20 INJECTION, SOLUTION INTRAVENOUS at 07:53

## 2023-10-25 RX ADMIN — ONDANSETRON 4 MG: 2 INJECTION INTRAMUSCULAR; INTRAVENOUS at 09:08

## 2023-10-25 RX ADMIN — FENTANYL CITRATE 25 MCG: 50 INJECTION, SOLUTION INTRAMUSCULAR; INTRAVENOUS at 09:04

## 2023-10-25 RX ADMIN — KETAMINE HYDROCHLORIDE 30 MG: 10 INJECTION INTRAMUSCULAR; INTRAVENOUS at 08:59

## 2023-10-25 RX ADMIN — METRONIDAZOLE 500 MG: 500 INJECTION, SOLUTION INTRAVENOUS at 07:58

## 2023-10-25 RX ADMIN — LIDOCAINE HYDROCHLORIDE 60 MG: 20 INJECTION, SOLUTION INFILTRATION; PERINEURAL at 08:58

## 2023-10-25 RX ADMIN — PROPOFOL 200 MCG/KG/MIN: 10 INJECTION, EMULSION INTRAVENOUS at 08:59

## 2023-10-25 RX ADMIN — ACETAMINOPHEN 975 MG: 325 TABLET ORAL at 07:53

## 2023-10-25 ASSESSMENT — ENCOUNTER SYMPTOMS: SEIZURES: 0

## 2023-10-25 ASSESSMENT — LIFESTYLE VARIABLES: TOBACCO_USE: 0

## 2023-10-25 NOTE — DISCHARGE INSTRUCTIONS
"[facility]  Home Care Following Anesthesia  For 24 hours after surgery:  Get plenty of rest.  A responsible adult must stay with you for at least 24 hours after you leave the surgery center.  Do not drive or use heavy equipment.  If you have weakness or tingling, don't drive or use heavy equipment until this feeling goes away.   Do not drink alcohol.   Avoid strenuous or risky activities.  Ask for help when climbing stairs.  You may feel lightheaded.  IF so, sit for a few minutes before standing.  Have someone help you get up.   If you have nausea (feel sick to your stomach): Drink only clear liquids such as apple juice, ginger ale, broth or 7-Up.  Rest may also help.  Be sure to drink enough fluids.  Move to a regular diet as you feel able.   You may have a slight fever.  Call the doctor if your fever is over 100 F (37.7 C) (taken under the tongue) or lasts longer than 24 hours.  You may have a dry mouth, a sore throat, muscle aches or trouble sleeping. These should go away after 24 hours.  Do not make important or legal decisions.   It is recommended to avoid smoking.        Today you received a Marcaine or bupivacaine block to numb the nerves near your surgery site.  This is a block using local anesthetic or \"numbing\" medication injected around the nerves to anesthetize or \"numb\" the area supplied by those nerves.  This block is injected into the muscle layer near your surgical site.  The medication may numb the location where you had surgery for 6-18 hours, but may last up to 24 hours.  If your surgical site is an arm or leg you should be careful with your affected limb, since it is possible to injure your limb without being aware of it due to the numbing.  Until full feeling returns, you should guard against bumping or hitting your limb, and avoid extreme hot or cold temperatures on the skin.  As the block wears off, the feeling will return as a tingling or prickly " sensation near your surgical site.  You will experience more discomfort from your incision as the feeling returns.  You may want to take a pain pill (a narcotic or Tylenol if this was prescribed by your surgeon) when you start to experience mild pain before the pain beccomes more severe.  If your pain medications do not control your pain you should notifiy your surgeon.    Tips for taking pain medications  To get the best pain relief possible, remember these points:  Take pain medications as directed, before pain becomes severe.  Pain medication can upset your stomach: taking it with food may help.  Constipation is a common side effect of pain medication. Drink plenty of  fluids.  Eat foods high in fiber. Take a stool softener if recommended by your doctor or pharmacist.  Do not drink alcohol, drive or operate machinery while taking pain medications.  Ask about other ways to control pain, such as with heat, ice or relaxation.    Tylenol/Acetaminophen Consumption    If you feel your pain relief is insufficient, you may take Tylenol/Acetaminophen in addition to your narcotic pain medication.   Be careful not to exceed 4,000 mg of Tylenol/Acetaminophen in a 24 hour period from all sources.  If you are taking extra strength Tylenol/acetaminophen (500 mg), the maximum dose is 8 tablets in 24 hours.  If you are taking regular strength acetaminophen (325 mg), the maximum dose is 12 tablets in 24 hours.    Call a doctor for any of the following:  Signs of infection (fever, growing tenderness at the surgery site, a large amount of drainage or bleeding, severe pain, foul-smelling drainage, redness, swelling).  It has been over 8 to 10 hours since surgery and you are still not able to urinate (pass water).  Headache for over 24 hours.  Numbness, tingling or weakness the day after surgery (if you had spinal anesthesia).  Signs of Covid-19 infection (temperature over 100 degrees, shortness of breath, cough, loss of taste/smell,  generalized body aches, persistent headache, chills, sore throat, nausea/vomiting/diarrhea)  Your doctor is:  Dr. Sal Bender, Colon Rectal: [phone]                    Or dial [phone] and ask for the resident on call for:  Colon Rectal  For emergency care, call the:  [facility]:  [phone] (TTY for hearing impaired: [phone])

## 2023-10-25 NOTE — ANESTHESIA CARE TRANSFER NOTE
Patient: Kamron Headley    Procedure: Procedure(s):  exam under anesthesia, excision of anal condyloma       Diagnosis: Anal warts [A63.0]  Diagnosis Additional Information: No value filed.    Anesthesia Type:   MAC     Note:    Oropharynx: oropharynx clear of all foreign objects and spontaneously breathing  Level of Consciousness: awake and drowsy  Oxygen Supplementation: room air      Dentition: dentition unchanged  Vital Signs Stable: post-procedure vital signs reviewed and stable  Report to RN Given: handoff report given  Patient transferred to: Phase II    Handoff Report: Identifed the Patient, Identified the Reponsible Provider, Reviewed the pertinent medical history, Discussed the surgical course, Reviewed Intra-OP anesthesia mangement and issues during anesthesia, Set expectations for post-procedure period and Allowed opportunity for questions and acknowledgement of understanding      Vitals:  Vitals Value Taken Time   /73 10/25/23 0951   Temp 36  C (96.8  F) 10/25/23 0951   Pulse     Resp 18 10/25/23 0951   SpO2 98 % 10/25/23 0951       Electronically Signed By: DONTE Medeiros CRNA  October 25, 2023  9:53 AM

## 2023-10-25 NOTE — ANESTHESIA POSTPROCEDURE EVALUATION
Patient: Kamron Headley    Procedure: Procedure(s):  exam under anesthesia, excision of anal condyloma       Anesthesia Type:  MAC    Note:  Disposition: Outpatient   Postop Pain Control: Uneventful            Sign Out: Well controlled pain   PONV: No   Neuro/Psych: Uneventful            Sign Out: Acceptable/Baseline neuro status   Airway/Respiratory: Uneventful            Sign Out: Acceptable/Baseline resp. status   CV/Hemodynamics: Uneventful            Sign Out: Acceptable CV status; No obvious hypovolemia; No obvious fluid overload   Other NRE: NONE   DID A NON-ROUTINE EVENT OCCUR?            Last vitals:  Vitals Value Taken Time   /83 10/25/23 1025   Temp 36.1  C (97  F) 10/25/23 1025   Pulse 81 10/25/23 1025   Resp 18 10/25/23 1025   SpO2 100 % 10/25/23 1025       Electronically Signed By: Kenneth Greco MD  October 25, 2023  11:56 AM

## 2023-10-25 NOTE — OP NOTE
"[facility] Colorectal [facility] Operative Report    PREOPERATIVE DIAGNOSIS:  1. Anal condyloma.  2. HIV-1     POSTOPERATIVE DIAGNOSIS:   1. Anal condyloma.  2. HIV-1    PROCEDURE:  1. Evaluation under anesthesia.  2. Fulguration of anal condyloma.    ANESTHESIA: MAC plus local anesthesia.    SURGEON:  Sal Bender MD, PhD     ASSISTANT(S): None    INDICATIONS FOR PROCEDURE  Kamron Headley is a 33 year old male who presented with symptomatic anal condyloma. I thoroughly discussed the risks, benefits, and alternatives of operative treatment with the patient and he agreed to proceed.    General risks related to anorectal surgery were reviewed with the patient. These include, but are not limited to urinary retention, abscess, infection, bleeding, chronic pain, anal stenosis, fistula, fissure, and fecal incontinence.     OPERATIVE PROCEDURE: After obtaining informed consent, the patient was brought to the operating room and placed in the prone jackknife position. Appropriate preoperative mechanical deep venous thrombosis prophylaxis was administered. MAC anesthesia was gently induced. Bilateral lower extremity pneumatic compression devices were applied and all pressure points were cushioned. The perianal area was then preped and draped in the standard sterile fashion. After a \"time-out\" was performed, a total of 30 mL of Bupivacaine 0.25% with epinephrine was injected as a pudendal block. Digital rectal exam and anoscopy were performed.     There were some very large condyloma at the anal verge and perianal skin.  These were on rather thin stalks.  There were also several condyloma in the anal canal.  These were mostly pedunculated and on rather this stalks.  These stretched as proximal as the dentate line.  The rectal mucosa appeared normal.    All visible condyloma were excised and fulgurated with monopolar electrocautery. The eschar was curetted.  All specimens were sent as \"anal condyloma\". The distal rectum and " anal canal were irrigated. Hemostasis was achieved. Instrument, sponge, and needle counts were all correct as reported to me. Silvadene was applied, as well as a sterile dressing. The patient tolerated the procedure well.    COMPLICATIONS: none, immediately.    ESTIMATED BLOOD LOSS: 5 mL.    SPECIMEN(S): anal condyloma (multiple, large and small)    DRAINS/TUBES: None.    OPERATIVE FINDINGS: mixed anal condyloma as described above    DISPOSITION:  PACU.    Sal Bender MD, PhD   Division of Colon and Rectal Surgery  [facility]      CC:  [facility] Surgery billing.  Bárbara Olivo NP.

## 2023-10-25 NOTE — ANESTHESIA PREPROCEDURE EVALUATION
Pre-Operative H & P     CC:  Preoperative exam to assess for increased cardiopulmonary risk while undergoing surgery and anesthesia.    Date of Encounter: 10/20/2023  Primary Care Physician:  Madie Turner     Reason for visit:   Encounter Diagnosis   Name Primary?    Anal warts Yes       HPI  Kamron Headley is a 33 year old male who presents for pre-operative H & P in preparation for  Procedure Information       Case: [id number] Date/Time: 10/25/23 0825    Procedure: exam under anesthesia, excision of anal condyloma (Anus)    Anesthesia type: MAC    Diagnosis: Anal warts [A63.0]    Pre-op diagnosis: Anal warts [A63.0]    Location: [place] [address] / [facility] [place]    Providers: Sal Bender MD            Patient is being evaluated for comorbid conditions of HIV.     Mr. Headley was recently evaluated by Bárbara Olivo NP, for anal warts. He had a prior excision with Dr Greco in 2013. He now presents for the above procedure.      History is obtained from the patient and chart review    Hx of abnormal bleeding or anti-platelet use: denies      Past Medical History  Past Medical History:   Diagnosis Date    Anal warts     Human immunodeficiency virus (HIV) disease (H)        Past Surgical History  Past Surgical History:   Procedure Laterality Date    DENTAL SURGERY      wisdom teeth    EXAM UNDER ANESTHESIA, FULGURATE CONDYLOMA ANUS, COMBINED  05/30/2013    Procedure: COMBINED EXAM UNDER ANESTHESIA, FULGURATE CONDYLOMA ANUS;  Exam Under Anesthesia of Anus, Excision and Fulguration of Anal Condyloma;  Surgeon: Matias Lopez MD;  Location:  OR       Prior to Admission Medications  Current Outpatient Medications   Medication Sig Dispense Refill    bictegravir-emtricitabine-tenofovir (BIKTARVY) -25 MG per tablet Take 1 tablet by mouth daily (Patient taking differently: Take 1 tablet by mouth every morning) 30 tablet 1    FLUZONE  QUADRIVALENT 0.5 ML injection       imiquimod (ALDARA) 5 % external cream Apply a small sized amount to warts or molluscum three times weekly at bedtime.   Wash off after 8 hours.   May use for up to 16 weeks. (Patient not taking: Reported on 5/26/2023) 12 packet 3    MODERNA COVID-19 BIVALENT 50 MCG/0.5ML injection          Allergies  No Known Allergies    Social History  Social History     Socioeconomic History    Marital status: Single     Spouse name: Not on file    Number of children: Not on file    Years of education: Not on file    Highest education level: Not on file   Occupational History    Not on file   Tobacco Use    Smoking status: Never    Smokeless tobacco: Never   Vaping Use    Vaping Use: Never used   Substance and Sexual Activity    Alcohol use: No    Drug use: No    Sexual activity: Yes     Partners: Male     Birth control/protection: Condom   Other Topics Concern    Parent/sibling w/ CABG, MI or angioplasty before 65F 55M? No   Social History Narrative    Not on file     Social Determinants of Health     Financial Resource Strain: Not on file   Food Insecurity: Not on file   Transportation Needs: Not on file   Physical Activity: Not on file   Stress: Not on file   Social Connections: Not on file   Interpersonal Safety: Not on file   Housing Stability: Not on file       Family History  Family History   Problem Relation Age of Onset    Cancer Paternal Grandmother     Family History Negative No family hx of         no skin disorders    Anesthesia Reaction No family hx of     Deep Vein Thrombosis (DVT) No family hx of        Review of Systems  The complete review of systems is negative other than noted in the HPI or here.   Anesthesia Evaluation   Pt has had prior anesthetic.     No history of anesthetic complications       ROS/MED HX  ENT/Pulmonary:    (-) tobacco use, asthma, sleep apnea and recent URI   Neurologic:  - neg neurologic ROS  (-) no seizures and no CVA   Cardiovascular:  - neg  cardiovascular ROS     METS/Exercise Tolerance: >4 METS    Hematologic:  - neg hematologic  ROS  (-) history of blood clots and history of blood transfusion   Musculoskeletal:  - neg musculoskeletal ROS     GI/Hepatic: Comment: Anal warts      (+)           hepatitis (LFTs WNL) type C,     (-) GERD and liver disease   Renal/Genitourinary:  - neg Renal ROS  (-) renal disease   Endo:  - neg endo ROS  (-) Type II DM   Psychiatric/Substance Use:  - neg psychiatric ROS     Infectious Disease: Comment: Anal warts      (+)     HIV,       Malignancy:  - neg malignancy ROS     Other:  - neg other ROS          There were no vitals taken for this visit.    Physical Exam  Constitutional: Awake, alert, cooperative, no apparent distress, and appears stated age.  Eyes: Pupils equal, round and reactive to light, extra ocular muscles intact, sclera clear, conjunctiva normal.  HENT: Normocephalic, oral pharynx with moist mucus membranes, good dentition. No goiter appreciated. No removable dental hardware.  Respiratory: Clear to auscultation bilaterally, no crackles or wheezing. No SOB when supine.  Cardiovascular: Regular rate and rhythm, normal S1 and S2, and no murmur noted.  Carotids +2, no bruits. No edema. Palpable pulses to radial, DP and PT arteries.   GI: Normal bowel sounds, soft, non-distended, non-tender, no masses palpated.    Lymph/Hematologic: No cervical lymphadenopathy and no supraclavicular lymphadenopathy.  Genitourinary:  deferred  Skin: Warm and dry.  No rashes.   Musculoskeletal: Full ROM of neck. There is no redness, warmth, or swelling of the joints. Gross motor strength is normal.    Neurologic: Awake, alert, oriented to name, place and time. Cranial nerves II-XII are grossly intact. Gait is normal. Ambulates from chair to exam table, seats self, lies supine and sits back up w/o assistance.  Neuropsychiatric: Calm, cooperative. Normal affect. Pleasant. Answers questions appropriately, follows commands w/o  difficulty.        PRIOR LABS/DIAGNOSTIC STUDIES:    All labs and imaging personally reviewed        The patient's records and results personally reviewed by this provider.       Assessment    Kamron Headley is a 33 year old male seen as a PAC referral for risk assessment and optimization for anesthesia.    Plan/Recommendations  Pt will be optimized for the proposed procedure.  See below for details on the assessment, risk, and preoperative recommendations    NEUROLOGY  - No history of TIA, CVA or seizure    -Post Op delirium risk factors:  No risk identified    ENT  - No current airway concerns.  Will need to be reassessed day of surgery.  Mallampati: II  TM: > 3    CARDIAC  - No history of CAD, Hypertension, and Afib  - METS (Metabolic Equivalents)  Patient performs 4 or more METS exercise without symptoms            Total Score: 0      RCRI-Very low risk: Class 1 0.4% complication rate            Total Score: 0        PULMONARY  LISBETH Low Risk            Total Score: 1    LISBETH: Male      - Denies asthma or inhaler use  - Tobacco History    History   Smoking Status    Never   Smokeless Tobacco    Never       GI  - Denies GERD  - Chronic Hep C. LFTs WNL.    PONV Medium Risk  Total Score: 2           1 AN PONV: Patient is not a current smoker    1 AN PONV: Intended Post Op Opioids          ENDOCRINE    - BMI: Estimated body mass index is 23.46 kg/m  as calculated from the following:    Height as of 10/20/23: 1.829 m (6').    Weight as of 10/20/23: 78.5 kg (173 lb).  Healthy Weight (BMI 18.5-24.9)  - No history of Diabetes Mellitus    HEME/IMMUNE  VTE Low Risk 0.5%            Total Score: 2    VTE: Male      - No history of abnormal bleeding or antiplatelet use.  - HIV, continue Biktarvy  - Anal warts        The patient is aware that the final anesthesia plan will be decided by the assigned anesthesia provider on the date of service.      The patient is optimized for their procedure. AVS with information on surgery  time/arrival time, meds and NPO status given by nursing staff. No further diagnostic testing indicated.      On the day of service:     Prep time: 12 minutes  Visit time: 19 minutes  Documentation time: 10 minutes  ------------------------------------------  Total time: 41 minutes      Desiree Amaral PA-C  Preoperative Assessment Center  [facility]  Phone: [phone]  Fax: [phone]    Physical Exam    Airway  airway exam normal           Respiratory Devices and Support         Dental       (+) Minor Abnormalities - some fillings, tiny chips      Cardiovascular   cardiovascular exam normal          Pulmonary   pulmonary exam normal                Anesthesia Plan    ASA Status:  2    NPO Status:  NPO Appropriate    Anesthesia Type: MAC.     - Reason for MAC: straight local not clinically adequate              Consents    Anesthesia Plan(s) and associated risks, benefits, and realistic alternatives discussed. Questions answered and patient/representative(s) expressed understanding.     - Discussed: Risks, Benefits and Alternatives for the PROCEDURE were discussed     - Discussed with:  Patient            Postoperative Care    Pain management: IV analgesics, Oral pain medications, Multi-modal analgesia.   PONV prophylaxis: Ondansetron (or other 5HT-3), Dexamethasone or Solumedrol, Background Propofol Infusion     Comments:

## 2023-10-27 DIAGNOSIS — B20 AIDS (ACQUIRED IMMUNODEFICIENCY SYNDROME), CD4 <=200 (H): ICD-10-CM

## 2023-10-27 LAB
PATH REPORT.COMMENTS IMP SPEC: ABNORMAL
PATH REPORT.COMMENTS IMP SPEC: YES
PATH REPORT.FINAL DX SPEC: ABNORMAL
PATH REPORT.GROSS SPEC: ABNORMAL
PATH REPORT.MICROSCOPIC SPEC OTHER STN: ABNORMAL
PATH REPORT.RELEVANT HX SPEC: ABNORMAL
PATHOLOGY SYNOPTIC REPORT: ABNORMAL
PHOTO IMAGE: ABNORMAL

## 2023-10-30 ENCOUNTER — TELEPHONE (OUTPATIENT)
Dept: SURGERY | Facility: CLINIC | Age: 33
End: 2023-10-30
Payer: COMMERCIAL

## 2023-10-30 DIAGNOSIS — B20 AIDS (ACQUIRED IMMUNODEFICIENCY SYNDROME), CD4 <=200 (H): ICD-10-CM

## 2023-10-30 NOTE — TELEPHONE ENCOUNTER
[facility] Call Center    Phone Message    May a detailed message be left on voicemail: no     Reason for Call: [employer] is calling to verify surgery information for this patient.   [phone] Claim# [id number]    Action Taken: Patient transferred to:  [facility]    Travel Screening: Not Applicable                                                                    parents and brother

## 2023-10-31 DIAGNOSIS — C21.1 MALIGNANT NEOPLASM OF ANAL CANAL (H): Primary | ICD-10-CM

## 2023-11-10 ENCOUNTER — OFFICE VISIT (OUTPATIENT)
Dept: SURGERY | Facility: CLINIC | Age: 33
End: 2023-11-10
Payer: COMMERCIAL

## 2023-11-10 VITALS — DIASTOLIC BLOOD PRESSURE: 87 MMHG | HEART RATE: 69 BPM | SYSTOLIC BLOOD PRESSURE: 121 MMHG | OXYGEN SATURATION: 95 %

## 2023-11-10 DIAGNOSIS — Z23 ENCOUNTER FOR IMMUNIZATION: ICD-10-CM

## 2023-11-10 DIAGNOSIS — C21.0 ANAL SQUAMOUS CELL CARCINOMA (H): Primary | ICD-10-CM

## 2023-11-10 DIAGNOSIS — Z09 FOLLOW-UP EXAMINATION AFTER COLORECTAL SURGERY: ICD-10-CM

## 2023-11-10 DIAGNOSIS — Z21 HIV-1 (HUMAN IMMUNODEFICIENCY VIRUS I) (H): ICD-10-CM

## 2023-11-10 PROCEDURE — 90471 IMMUNIZATION ADMIN: CPT

## 2023-11-10 PROCEDURE — 90651 9VHPV VACCINE 2/3 DOSE IM: CPT

## 2023-11-10 PROCEDURE — 99024 POSTOP FOLLOW-UP VISIT: CPT

## 2023-11-10 ASSESSMENT — PAIN SCALES - GENERAL: PAINLEVEL: NO PAIN (0)

## 2023-11-10 NOTE — PROGRESS NOTES
Colon and Rectal Surgery Postoperative Clinic Note    RE: Kamron Headley  : 1990  GILLIAN: 11/10/2023    Kamron Headley is a very pleasant 33 year old male with a history of HIV-1 now status post examination under anesthesia with excision/fulguration of anal condyloma.    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)  - Viral cytopathic changes, consistent with Human Papillomavirus infection      TUMOR   Tumor Site  Perianal region   Histologic Type  Squamous cell carcinoma   Histologic Type Comment  HPV-associated   Histologic Grade  G2, moderately differentiated   Tumor Size  Greatest Dimension (Centimeters): 0.2 cm   Tumor Extent  Invades perianal skin   Treatment Effect  No known presurgical therapy   Lymphovascular Invasion  Not identified   Perineural Invasion  Not identified   MARGINS   Margin Status for Invasive Carcinoma  All margins negative for invasive carcinoma   Distance from Invasive Carcinoma to Closest Deep Margin  7 mm   Distance from Invasive Carcinoma to Closest Mucosal Margin  6 mm   Margin Status for High-Grade Dysplasia  High-grade dysplasia present at mucosal margin: In multiple locations   PATHOLOGIC STAGE CLASSIFICATION (pTNM, AJCC 8th Edition)   Reporting of pT, pN, and (when applicable) pM categories is based on information available to the pathologist at the time the report is issued. As per the AJCC (Chapter 1, 8th Ed.) it is the managing physician s responsibility to establish the final pathologic stage based upon all pertinent information, including but potentially not limited to this pathology report.   pT Category  pT1   ADDITIONAL FINDINGS   Additional Findings  Condyloma acuminatum     Squamous intraepithelial lesion     Interval history: Doing fairly well. Pain is getting better. This week he finally had a bowel movement without  bleeding. No fecal incontinence. His staging MRI and CT are scheduled for 11/24.     Physical Examination: Exam was chaperoned by Dago Duran, EMT-P   /87 (BP Location: Left arm, Patient Position: Sitting, Cuff Size: Adult Large)   Pulse 69   SpO2 95%   General: alert, oriented, in no acute distress, sitting comfortably  Perianal external examination:  Surgical wounds open at anterior and posterior. The posterior wound is larger and extends slightly towards the left lateral. All wounds have healthy granulation tissue with some fibrinous exudate. No surrounding erythema.    Assessment/Plan:  33 year old male with a history of HIV-1 now status post examination under anesthesia with excision/fulguration of anal condyloma. Unfortunately pathology returned SCC (pT1), all margins negative. He is recovering well from surgery. Staging imaging scheduled in 2 weeks. Next steps will be guided by staging. Our team will be in touch once we have results. He also requested getting his next dose of the HPV vaccine, since he only had 1 dose back in February. This was done today. Patient's questions were answered to his stated satisfaction and he is in agreement with this plan.     Medical history:  Past Medical History:   Diagnosis Date    Anal warts     Human immunodeficiency virus (HIV) disease (H)        Surgical history:  Past Surgical History:   Procedure Laterality Date    DENTAL SURGERY      wisdom teeth    EXAM UNDER ANESTHESIA, FULGURATE CONDYLOMA ANUS, COMBINED  05/30/2013    Procedure: COMBINED EXAM UNDER ANESTHESIA, FULGURATE CONDYLOMA ANUS;  Exam Under Anesthesia of Anus, Excision and Fulguration of Anal Condyloma;  Surgeon: Matias Lopez MD;  Location: [facility] OR    FULGURATE CONDYLOMA RECTUM N/A 10/25/2023    Procedure: exam under anesthesia, excision of anal condyloma;  Surgeon: Sal Bender MD;  Location: [facility] OR       Problem list:    Patient Active Problem List    Diagnosis Date Noted     Need for vaccination 07/25/2023     Priority: Medium    Chronic hepatitis C without hepatic coma (H) 02/17/2023     Priority: Medium    AIDS (acquired immunodeficiency syndrome), CD4 <=200 (H) 02/17/2023     Priority: Medium    Oral aphthous ulcer 02/16/2023     Priority: Medium    Borderline personality disorder (H) 02/16/2023     Priority: Medium    Personality disorder (H) 02/16/2023     Priority: Medium    HIV-1 (human immunodeficiency virus I) (H) 05/25/2016     Priority: Medium    High risk HPV infection 04/18/2013     Priority: Medium    Anal warts 04/18/2013     Priority: Medium    Anal skin tag 04/11/2013     Priority: Medium    Skin rash 02/11/2013     Priority: Medium    Elevated LFTs 02/11/2013     Priority: Medium    CARDIOVASCULAR SCREENING; LDL GOAL LESS THAN 160 10/29/2012     Priority: Medium    Tinea corporis 10/29/2012     Priority: Medium       Medications:  Current Outpatient Medications   Medication Sig Dispense Refill    bictegravir-emtricitabine-tenofovir (BIKTARVY) -25 MG per tablet Take 1 tablet by mouth daily 30 tablet 2    FLUZONE QUADRIVALENT 0.5 ML injection       MODERNA COVID-19 BIVALENT 50 MCG/0.5ML injection       silver sulfADIAZINE (SILVADENE) 1 % external cream Apply topically daily Apply daily (or more frequently if needed) to perianal area.  Do this for seven days and then as needed. 50 g 1    oxyCODONE (ROXICODONE) 5 MG tablet Take 1-2 tablets (5-10 mg) by mouth every 4 hours as needed for moderate to severe pain (Patient not taking: Reported on 11/10/2023) 20 tablet 0       Allergies:  No Known Allergies    Family history:  Family History   Problem Relation Age of Onset    Cancer Paternal Grandmother     Family History Negative No family hx of         no skin disorders    Anesthesia Reaction No family hx of     Deep Vein Thrombosis (DVT) No family hx of        Social history:  Social History     Tobacco Use    Smoking status: Never    Smokeless tobacco: Never    Substance Use Topics    Alcohol use: No     Marital status: single.    Nursing Notes:   Dago Duran, EMT  11/10/2023  9:06 AM  Signed  Chief Complaint   Patient presents with    Post-op Visit       Vitals:    11/10/23 0904   BP: 121/87   BP Location: Left arm   Patient Position: Sitting   Cuff Size: Adult Large   Pulse: 69   SpO2: 95%       There is no height or weight on file to calculate BMI.    Dago Duran EMT-P       15 minutes spent on the date of the encounter doing chart review, history and exam, documentation and further activities as noted above.   This is a postop visit.      Tran Cannon PA-C  Colon and Rectal Surgery  [facility]

## 2023-11-10 NOTE — LETTER
11/10/2023       RE: Kamron Headley  [address]  [place] [address]     Dear Colleague,    Thank you for referring your patient, Kamron Headley, to the [facility] COLON AND RECTAL [facility] CLINIC [place] [facility]. Please see a copy of my visit note below.    Colon and Rectal Surgery Postoperative Clinic Note    RE: Kamron Headley  : 1990  GILLIAN: 11/10/2023    Kamron Headley is a very pleasant 33 year old male with a history of HIV-1 now status post examination under anesthesia with excision/fulguration of anal condyloma.    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)  - Viral cytopathic changes, consistent with Human Papillomavirus infection      TUMOR   Tumor Site  Perianal region   Histologic Type  Squamous cell carcinoma   Histologic Type Comment  HPV-associated   Histologic Grade  G2, moderately differentiated   Tumor Size  Greatest Dimension (Centimeters): 0.2 cm   Tumor Extent  Invades perianal skin   Treatment Effect  No known presurgical therapy   Lymphovascular Invasion  Not identified   Perineural Invasion  Not identified   MARGINS   Margin Status for Invasive Carcinoma  All margins negative for invasive carcinoma   Distance from Invasive Carcinoma to Closest Deep Margin  7 mm   Distance from Invasive Carcinoma to Closest Mucosal Margin  6 mm   Margin Status for High-Grade Dysplasia  High-grade dysplasia present at mucosal margin: In multiple locations   PATHOLOGIC STAGE CLASSIFICATION (pTNM, AJCC 8th Edition)   Reporting of pT, pN, and (when applicable) pM categories is based on information available to the pathologist at the time the report is issued. As per the AJCC (Chapter 1, 8th Ed.) it is the managing physician s responsibility to establish the final pathologic  stage based upon all pertinent information, including but potentially not limited to this pathology report.   pT Category  pT1   ADDITIONAL FINDINGS   Additional Findings  Condyloma acuminatum     Squamous intraepithelial lesion     Interval history: Doing fairly well. Pain is getting better. This week he finally had a bowel movement without bleeding. No fecal incontinence. His staging MRI and CT are scheduled for 11/24.     Physical Examination: Exam was chaperoned by Dago Duran, EMT-P   /87 (BP Location: Left arm, Patient Position: Sitting, Cuff Size: Adult Large)   Pulse 69   SpO2 95%   General: alert, oriented, in no acute distress, sitting comfortably  Perianal external examination:  Surgical wounds open at anterior and posterior. The posterior wound is larger and extends slightly towards the left lateral. All wounds have healthy granulation tissue with some fibrinous exudate. No surrounding erythema.    Assessment/Plan:  33 year old male with a history of HIV-1 now status post examination under anesthesia with excision/fulguration of anal condyloma. Unfortunately pathology returned SCC (pT1), all margins negative. He is recovering well from surgery. Staging imaging scheduled in 2 weeks. Next steps will be guided by staging. Our team will be in touch once we have results. He also requested getting his next dose of the HPV vaccine, since he only had 1 dose back in February. This was done today. Patient's questions were answered to his stated satisfaction and he is in agreement with this plan.     Medical history:  Past Medical History:   Diagnosis Date    Anal warts     Human immunodeficiency virus (HIV) disease (H)        Surgical history:  Past Surgical History:   Procedure Laterality Date    DENTAL SURGERY      wisdom teeth    EXAM UNDER ANESTHESIA, FULGURATE CONDYLOMA ANUS, COMBINED  05/30/2013    Procedure: COMBINED EXAM UNDER ANESTHESIA, FULGURATE CONDYLOMA ANUS;  Exam Under Anesthesia of Anus,  Excision and Fulguration of Anal Condyloma;  Surgeon: Matias Lopez MD;  Location: [facility] OR    FULGURATE CONDYLOMA RECTUM N/A 10/25/2023    Procedure: exam under anesthesia, excision of anal condyloma;  Surgeon: Sal Bender MD;  Location: [facility] OR       Problem list:    Patient Active Problem List    Diagnosis Date Noted    Need for vaccination 07/25/2023     Priority: Medium    Chronic hepatitis C without hepatic coma (H) 02/17/2023     Priority: Medium    AIDS (acquired immunodeficiency syndrome), CD4 <=200 (H) 02/17/2023     Priority: Medium    Oral aphthous ulcer 02/16/2023     Priority: Medium    Borderline personality disorder (H) 02/16/2023     Priority: Medium    Personality disorder (H) 02/16/2023     Priority: Medium    HIV-1 (human immunodeficiency virus I) (H) 05/25/2016     Priority: Medium    High risk HPV infection 04/18/2013     Priority: Medium    Anal warts 04/18/2013     Priority: Medium    Anal skin tag 04/11/2013     Priority: Medium    Skin rash 02/11/2013     Priority: Medium    Elevated LFTs 02/11/2013     Priority: Medium    CARDIOVASCULAR SCREENING; LDL GOAL LESS THAN 160 10/29/2012     Priority: Medium    Tinea corporis 10/29/2012     Priority: Medium       Medications:  Current Outpatient Medications   Medication Sig Dispense Refill    bictegravir-emtricitabine-tenofovir (BIKTARVY) -25 MG per tablet Take 1 tablet by mouth daily 30 tablet 2    FLUZONE QUADRIVALENT 0.5 ML injection       MODERNA COVID-19 BIVALENT 50 MCG/0.5ML injection       silver sulfADIAZINE (SILVADENE) 1 % external cream Apply topically daily Apply daily (or more frequently if needed) to perianal area.  Do this for seven days and then as needed. 50 g 1    oxyCODONE (ROXICODONE) 5 MG tablet Take 1-2 tablets (5-10 mg) by mouth every 4 hours as needed for moderate to severe pain (Patient not taking: Reported on 11/10/2023) 20 tablet 0       Allergies:  No Known Allergies    Family  history:  Family History   Problem Relation Age of Onset    Cancer Paternal Grandmother     Family History Negative No family hx of         no skin disorders    Anesthesia Reaction No family hx of     Deep Vein Thrombosis (DVT) No family hx of        Social history:  Social History     Tobacco Use    Smoking status: Never    Smokeless tobacco: Never   Substance Use Topics    Alcohol use: No     Marital status: single.    Nursing Notes:   Dago Duran, EMT  11/10/2023  9:06 AM  Signed  Chief Complaint   Patient presents with    Post-op Visit       Vitals:    11/10/23 0904   BP: 121/87   BP Location: Left arm   Patient Position: Sitting   Cuff Size: Adult Large   Pulse: 69   SpO2: 95%       There is no height or weight on file to calculate BMI.    Dago Duran EMT-P       15 minutes spent on the date of the encounter doing chart review, history and exam, documentation and further activities as noted above.   This is a postop visit.      Again, thank you for allowing me to participate in the care of your patient.      Sincerely,    Tran Cannon PA-C

## 2023-11-10 NOTE — NURSING NOTE
Chief Complaint   Patient presents with    Post-op Visit       Vitals:    11/10/23 0904   BP: 121/87   BP Location: Left arm   Patient Position: Sitting   Cuff Size: Adult Large   Pulse: 69   SpO2: 95%       There is no height or weight on file to calculate BMI.    Dago Duran EMT-P

## 2023-11-20 DIAGNOSIS — C21.0 ANAL SQUAMOUS CELL CARCINOMA (H): Primary | ICD-10-CM

## 2023-11-24 ENCOUNTER — ANCILLARY PROCEDURE (OUTPATIENT)
Dept: CT IMAGING | Facility: CLINIC | Age: 33
End: 2023-11-24
Attending: SURGERY
Payer: COMMERCIAL

## 2023-11-24 ENCOUNTER — ANCILLARY PROCEDURE (OUTPATIENT)
Dept: MRI IMAGING | Facility: CLINIC | Age: 33
End: 2023-11-24
Attending: SURGERY
Payer: COMMERCIAL

## 2023-11-24 DIAGNOSIS — C21.0 ANAL SQUAMOUS CELL CARCINOMA (H): ICD-10-CM

## 2023-11-24 PROCEDURE — 71260 CT THORAX DX C+: CPT | Mod: GC | Performed by: RADIOLOGY

## 2023-11-24 PROCEDURE — 72197 MRI PELVIS W/O & W/DYE: CPT | Performed by: RADIOLOGY

## 2023-11-24 PROCEDURE — A9585 GADOBUTROL INJECTION: HCPCS | Mod: JZ | Performed by: RADIOLOGY

## 2023-11-24 RX ORDER — IOPAMIDOL 755 MG/ML
83 INJECTION, SOLUTION INTRAVASCULAR ONCE
Status: COMPLETED | OUTPATIENT
Start: 2023-11-24 | End: 2023-11-24

## 2023-11-24 RX ORDER — GADOBUTROL 604.72 MG/ML
7.5 INJECTION INTRAVENOUS ONCE
Status: COMPLETED | OUTPATIENT
Start: 2023-11-24 | End: 2023-11-24

## 2023-11-24 RX ADMIN — IOPAMIDOL 83 ML: 755 INJECTION, SOLUTION INTRAVASCULAR at 08:48

## 2023-11-24 RX ADMIN — GADOBUTROL 7.5 ML: 604.72 INJECTION INTRAVENOUS at 10:12

## 2023-11-24 NOTE — DISCHARGE INSTRUCTIONS

## 2023-11-28 ENCOUNTER — ALLIED HEALTH/NURSE VISIT (OUTPATIENT)
Dept: TRANSPLANT | Facility: CLINIC | Age: 33
End: 2023-11-28
Attending: INTERNAL MEDICINE
Payer: COMMERCIAL

## 2023-11-28 ENCOUNTER — PHARMACY VISIT (OUTPATIENT)
Dept: PHARMACY | Facility: CLINIC | Age: 33
End: 2023-11-28

## 2023-11-28 ENCOUNTER — LAB (OUTPATIENT)
Dept: LAB | Facility: CLINIC | Age: 33
End: 2023-11-28
Payer: COMMERCIAL

## 2023-11-28 DIAGNOSIS — B20 AIDS (ACQUIRED IMMUNODEFICIENCY SYNDROME), CD4 <=200 (H): ICD-10-CM

## 2023-11-28 DIAGNOSIS — Z21 HIV-1 (HUMAN IMMUNODEFICIENCY VIRUS I) (H): Primary | ICD-10-CM

## 2023-11-28 DIAGNOSIS — Z23 NEED FOR VACCINATION: Primary | ICD-10-CM

## 2023-11-28 DIAGNOSIS — Z21 HIV-1 (HUMAN IMMUNODEFICIENCY VIRUS I) (H): ICD-10-CM

## 2023-11-28 LAB
ALBUMIN SERPL BCG-MCNC: 4.7 G/DL (ref 3.5–5.2)
ALP SERPL-CCNC: 102 U/L (ref 40–150)
ALT SERPL W P-5'-P-CCNC: 77 U/L (ref 0–70)
ANION GAP SERPL CALCULATED.3IONS-SCNC: 10 MMOL/L (ref 7–15)
AST SERPL W P-5'-P-CCNC: 32 U/L (ref 0–45)
BASOPHILS # BLD AUTO: 0 10E3/UL (ref 0–0.2)
BASOPHILS NFR BLD AUTO: 1 %
BILIRUB SERPL-MCNC: 0.5 MG/DL
BUN SERPL-MCNC: 8.4 MG/DL (ref 6–20)
CALCIUM SERPL-MCNC: 10 MG/DL (ref 8.6–10)
CHLORIDE SERPL-SCNC: 100 MMOL/L (ref 98–107)
CREAT SERPL-MCNC: 0.59 MG/DL (ref 0.67–1.17)
DEPRECATED HCO3 PLAS-SCNC: 31 MMOL/L (ref 22–29)
EGFRCR SERPLBLD CKD-EPI 2021: >90 ML/MIN/1.73M2
EOSINOPHIL # BLD AUTO: 0.1 10E3/UL (ref 0–0.7)
EOSINOPHIL NFR BLD AUTO: 1 %
ERYTHROCYTE [DISTWIDTH] IN BLOOD BY AUTOMATED COUNT: 11.9 % (ref 10–15)
GLUCOSE SERPL-MCNC: 91 MG/DL (ref 70–99)
HCT VFR BLD AUTO: 45.4 % (ref 40–53)
HGB BLD-MCNC: 15.8 G/DL (ref 13.3–17.7)
IMM GRANULOCYTES # BLD: 0 10E3/UL
IMM GRANULOCYTES NFR BLD: 0 %
LYMPHOCYTES # BLD AUTO: 0.9 10E3/UL (ref 0.8–5.3)
LYMPHOCYTES NFR BLD AUTO: 19 %
MCH RBC QN AUTO: 31.9 PG (ref 26.5–33)
MCHC RBC AUTO-ENTMCNC: 34.8 G/DL (ref 31.5–36.5)
MCV RBC AUTO: 92 FL (ref 78–100)
MONOCYTES # BLD AUTO: 0.3 10E3/UL (ref 0–1.3)
MONOCYTES NFR BLD AUTO: 7 %
NEUTROPHILS # BLD AUTO: 3.3 10E3/UL (ref 1.6–8.3)
NEUTROPHILS NFR BLD AUTO: 72 %
NRBC # BLD AUTO: 0 10E3/UL
NRBC BLD AUTO-RTO: 0 /100
PLATELET # BLD AUTO: 235 10E3/UL (ref 150–450)
POTASSIUM SERPL-SCNC: 4.2 MMOL/L (ref 3.4–5.3)
PROT SERPL-MCNC: 7.9 G/DL (ref 6.4–8.3)
RBC # BLD AUTO: 4.95 10E6/UL (ref 4.4–5.9)
SODIUM SERPL-SCNC: 141 MMOL/L (ref 135–145)
WBC # BLD AUTO: 4.6 10E3/UL (ref 4–11)

## 2023-11-28 PROCEDURE — 80053 COMPREHEN METABOLIC PANEL: CPT | Performed by: PATHOLOGY

## 2023-11-28 PROCEDURE — 36415 COLL VENOUS BLD VENIPUNCTURE: CPT | Performed by: PATHOLOGY

## 2023-11-28 PROCEDURE — 86360 T CELL ABSOLUTE COUNT/RATIO: CPT | Performed by: INTERNAL MEDICINE

## 2023-11-28 PROCEDURE — 90746 HEPB VACCINE 3 DOSE ADULT IM: CPT | Performed by: INTERNAL MEDICINE

## 2023-11-28 PROCEDURE — 85025 COMPLETE CBC W/AUTO DIFF WBC: CPT | Performed by: PATHOLOGY

## 2023-11-28 PROCEDURE — 99000 SPECIMEN HANDLING OFFICE-LAB: CPT | Performed by: PATHOLOGY

## 2023-11-28 PROCEDURE — 250N000011 HC RX IP 250 OP 636: Performed by: INTERNAL MEDICINE

## 2023-11-28 PROCEDURE — 87536 HIV-1 QUANT&REVRSE TRNSCRPJ: CPT | Performed by: INTERNAL MEDICINE

## 2023-11-28 PROCEDURE — G0010 ADMIN HEPATITIS B VACCINE: HCPCS | Performed by: INTERNAL MEDICINE

## 2023-11-28 RX ADMIN — HEPATITIS B VACCINE (RECOMBINANT) 20 MCG: 20 INJECTION, SUSPENSION INTRAMUSCULAR at 15:06

## 2023-11-28 NOTE — PROGRESS NOTES
Chief Complaint   Patient presents with    Allied Health Visit     Hep B Immj     Administrations This Visit       hepatitis B vaccine (ENGERIX-B) injection 20 mcg       Admin Date  11/28/2023 Action  $Given Dose  20 mcg Route  Intramuscular Documented By  Myra Wills, SARTHAK Wills CMA

## 2023-11-28 NOTE — PROGRESS NOTES
"HIV Clinical Follow Up Assessment    Activity Medications    BIKTARVY     Summary Notes  Spoke with Kamron via [facility] for assessment. Current Regimen: Biktarvy once daily     Med/Allergy Changes: No changes at this time but pt is considering starting AHCC, creatine, and niacinamide. Discussed that there are no known interactions with Biktarvy but there is not much information and no high quality large studies to make us feel super confident. Recommended to stay well hydrated especially if he does start the creatine.     Adherence: PDC = 1.0. Pt denies missed doses.     Tolerability: Denies SE     HIV: Labs from August show VL is undetectable and CD4 count is 214 cells/uL. Today pt says \"everything is going well.\" He has ID appt in Jan.     Follow up: Quarterly     Chloe Grace, LyndseyD  Therapy Management Pharmacist  [facility]    "

## 2023-11-28 NOTE — PROGRESS NOTES
Per [facility] Protocol, routine B20 lab orders entered as pt is due for them in order to monitor pt's disease state.    Maulik Molina RN  Infectious Disease 3:14 PM 11/28/23

## 2023-11-29 LAB
CD3 CELLS # BLD: 547 CELLS/UL (ref 603–2990)
CD3 CELLS NFR BLD: 62 % (ref 49–84)
CD3+CD4+ CELLS # BLD: 231 CELLS/UL (ref 441–2156)
CD3+CD4+ CELLS NFR BLD: 26 % (ref 28–63)
CD3+CD4+ CELLS/CD3+CD8+ CLL BLD: 0.8 % (ref 1.4–2.6)
CD3+CD8+ CELLS # BLD: 288 CELLS/UL (ref 125–1312)
CD3+CD8+ CELLS NFR BLD: 33 % (ref 10–40)
T CELL COMMENT: ABNORMAL

## 2023-11-30 LAB
HIV1 RNA # PLAS NAA DL=20: <20 COPIES/ML
HIV1 RNA SERPL NAA+PROBE-LOG#: <1.3 {LOG_COPIES}/ML

## 2023-12-04 NOTE — PROGRESS NOTES
Colon and Rectal Surgery Clinic Note    RE: Kamron Headley.  : 1990.  GILLIAN: 2023.    Reason for visit: anal cancer.    HPI: Kamron Headley is a 33 year old male who presents today for anal squamous cell carcinoma. He has a past medical history of HIV/AIDS (follows with Dr. Madie Turner in ID). He was previously seen by my colleague, Bárbara Olivo, WILNER, for anal warts and advised Kamron that we wanted his CD4 counts to improve before undergoing surgery. His most recent CD4 count was 214. He is now s/p examination under anesthesia with excision/fulguration of anal condyloma. Unfortunately pathology was positive for invasive squamous cell carcinoma. MRI pelvis with no concerning lymphadenopathy. CT Chest with multiple scattered sub-3mm nodules.      Interval History: He received one dose of the HPV vaccine but did not complete the full course. He is otherwise doing well.  He is feeling good and bowel movements are much improved since the condyloma excision.  He notices scant blood with bm's, mostly on the toilet paper but some in the toilet bowl.       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 No suspicious pelvic or  inguinal lymphadenopathy.    CT  Chest (11/24/2023):  IMPRESSION: Multiple scattered sub-3 mm nodules with particular note  taken of the tree-in-bud nodules with surrounding ground glass  opacities in the right upper lobe. Likely infectious. In the context  of diagnosis of anal squamous cell carcinoma, cannot exclude  metastatic malignancy. Recommend short-term follow-up with CT chest in  3 months to assess stability.    Medications:  Current Outpatient Medications   Medication Sig Dispense Refill    bictegravir-emtricitabine-tenofovir (BIKTARVY) -25 MG per tablet Take 1 tablet by mouth daily 30 tablet 2    FLUZONE QUADRIVALENT 0.5 ML injection       MODERNA COVID-19 BIVALENT 50 MCG/0.5ML injection       oxyCODONE (ROXICODONE) 5 MG tablet Take 1-2 tablets (5-10 mg) by mouth every 4 hours as needed for moderate to severe pain (Patient not taking: Reported on 11/10/2023) 20 tablet 0    silver sulfADIAZINE (SILVADENE) 1 % external cream Apply topically daily Apply daily (or more frequently if needed) to perianal area.  Do this for seven days and then as needed. 50 g 1       ROS:  A complete review of systems was performed with the patient and all systems negative except as per HPI.    Physical Examination:  Exam was chaperoned by Mary Hoffmann CMA  There were no vitals taken for this visit.  General: Well hydrated. No acute distress.  Abdomen: Soft, NT. No inguinal adenopathy palpated.  Perianal external examination:  Perianal skin: excellent, the areas of excision are all well healed and look great; there is no evidence of any condylomas in the perianal skin.  Lesions: No evidence of an external lesion, nodularity, or induration in the perianal region.  Eversion of buttocks: There was not evidence of an anal fissure. Details: N/A.  Skin tags or external hemorrhoids: None.  Digital rectal examination: Was performed.   Sphincter tone: Good.  Palpable lesions: Yes - Location: very small verrucous appearing lesions that are in the anal canal -  nothing large, nothing suspicious looking.  Other: None.  Bimanual examination: was not performed.    Anoscopy: Was performed.   Hemorrhoids: No significant internal hemorrhoids.  Lesions: small verrucous appearing masses in anal canal; they are small     Procedures:  None today.    ASSESSMENT  34 y/o gentleman with very large condyloma s/p excision, with small foci of invasive SCC and lots of AIN3.     Risks, benefits, and alternatives of operative treatment were thoroughly discussed with the patient, he/she understands these well and agrees to proceed.    PLAN  1. OR for HRA  2. Discussed at length with anal neoplasia colleagues - at this point would not recommend Thaddeus protocol for this limited disease and agree with very careful surveillance with HRA    30 minutes spent on the date of the encounter doing chart review, history and exam, imaging review, documentation and further activities as noted above.    Sal Bender MD, PhD    Division of Colon and Rectal Surgery  [facility]    Referring Provider:  No referring provider defined for this encounter.     Primary Care Provider:  Madie Turner

## 2023-12-12 ENCOUNTER — OFFICE VISIT (OUTPATIENT)
Dept: SURGERY | Facility: CLINIC | Age: 33
End: 2023-12-12
Payer: COMMERCIAL

## 2023-12-12 VITALS
HEIGHT: 72 IN | OXYGEN SATURATION: 98 % | DIASTOLIC BLOOD PRESSURE: 84 MMHG | WEIGHT: 173.2 LBS | HEART RATE: 80 BPM | BODY MASS INDEX: 23.46 KG/M2 | SYSTOLIC BLOOD PRESSURE: 117 MMHG

## 2023-12-12 DIAGNOSIS — K62.82 ANAL DYSPLASIA: Primary | ICD-10-CM

## 2023-12-12 DIAGNOSIS — A63.0 ANAL WARTS: ICD-10-CM

## 2023-12-12 DIAGNOSIS — B97.7 HIGH RISK HPV INFECTION: ICD-10-CM

## 2023-12-12 DIAGNOSIS — C21.0 ANAL SQUAMOUS CELL CARCINOMA (H): Primary | ICD-10-CM

## 2023-12-12 DIAGNOSIS — Z21 HIV-1 (HUMAN IMMUNODEFICIENCY VIRUS I) (H): ICD-10-CM

## 2023-12-12 PROCEDURE — 99214 OFFICE O/P EST MOD 30 MIN: CPT | Performed by: SURGERY

## 2023-12-12 ASSESSMENT — PAIN SCALES - GENERAL: PAINLEVEL: NO PAIN (0)

## 2023-12-12 NOTE — PATIENT INSTRUCTIONS
Follow up:  Schedule High resolution anoscopy in the OR with Dr. Bender by calling [phone]      Please call with any questions or concerns regarding your clinic visit today.    It is a pleasure being involved in your health care.    Contacts post-consultation depending on your need:    Radiology Appointments [phone]    Schedule Clinic Appointments [phone] # 1   M-F 7:30 - 5 pm    NISSA Garcia [phone]    Clinic Fax Number [phone]    Surgery Scheduling [phone]    My Chart is available 24 hours a day and is a secure way to access your records and communicate with your care team.  I strongly recommend signing up if you haven't already done so, if you are comfortable with computers.  If you would like to inquire about this or are having problems with My Chart access, you may call [phone] or go online at [email or web address].  Please allow at least 24 hours for a response and extra time on weekends and Holidays.

## 2023-12-12 NOTE — NURSING NOTE
Chief Complaint   Patient presents with    Follow Up       Vitals:    12/12/23 1426   BP: 117/84   BP Location: Left arm   Patient Position: Sitting   Cuff Size: Adult Regular   Pulse: 80   SpO2: 98%   Weight: 173 lb 3.2 oz   Height: 6'       Body mass index is 23.49 kg/m .    Mary Hoffmann CMA

## 2023-12-12 NOTE — LETTER
2023       RE: Kamron Headley  [address]  [place] [facility] [address]       Dear Colleague,    Thank you for referring your patient, Kamron Headley, to the [facility]INIC [place] at [facility]. Please see a copy of my visit note below.    Colon and Rectal Surgery Clinic Note    RE: Kamron Headley.  : 1990.  GILLIAN: 2023.    Reason for visit: anal cancer.    HPI: Kamron Headley is a 33 year old male who presents today for anal squamous cell carcinoma. He has a past medical history of HIV/AIDS (follows with Dr. Madie Turner in ID). He was previously seen by my colleague, Bárbara Olivo, WILNER, for anal warts and advised Kamron that we wanted his CD4 counts to improve before undergoing surgery. His most recent CD4 count was 214. He is now s/p examination under anesthesia with excision/fulguration of anal condyloma. Unfortunately pathology was positive for invasive squamous cell carcinoma. MRI pelvis with no concerning lymphadenopathy. CT Chest with multiple scattered sub-3mm nodules.      Interval History: He received one dose of the HPV vaccine but did not complete the full course. He is otherwise doing well.  He is feeling good and bowel movements are much improved since the condyloma excision.  He notices scant blood with bm's, mostly on the toilet paper but some in the toilet bowl.       T Cell subset profile (2023):  omponent Ref Range & Units        CD3% Total T Cells 49 - 84 % 62    Absolute CD3, Total T Cells 603 - 2,990 cells/uL 557 Low     CD4% [place] T Cells 28 - 63 % 24 Low     Absolute CD4, [place] T Cells 441 - 2,156 cells/uL 214 Low     CD8% Suppressor T Cells 10 - 40 % 34    Absolute CD8, Suppressor T Cells 125 - 1,312 cells/uL 306    CD4:CD8 Ratio 1.40 - 2.60 0.70 Low     T Cell Comment         Surgical Pathology (10/25/2023):  Anal condylomas, excision:  - Invasive  squamous cell carcinoma, HPV-associated  - Background of high grade squamous intraepithelial lesion (anal intraepithelial neoplasia 3)  - Viral cytopathic changes, consistent with Human Papillomavirus infection     MRI Pelvis (11/24/2023):  IMPRESSION: No appreciable residual anal mass. No suspicious pelvic or  inguinal lymphadenopathy.    CT Chest (11/24/2023):  IMPRESSION: Multiple scattered sub-3 mm nodules with particular note  taken of the tree-in-bud nodules with surrounding ground glass  opacities in the right upper lobe. Likely infectious. In the context  of diagnosis of anal squamous cell carcinoma, cannot exclude  metastatic malignancy. Recommend short-term follow-up with CT chest in  3 months to assess stability.    Medications:  Current Outpatient Medications   Medication Sig Dispense Refill    bictegravir-emtricitabine-tenofovir (BIKTARVY) -25 MG per tablet Take 1 tablet by mouth daily 30 tablet 2    FLUZONE QUADRIVALENT 0.5 ML injection       MODERNA COVID-19 BIVALENT 50 MCG/0.5ML injection       oxyCODONE (ROXICODONE) 5 MG tablet Take 1-2 tablets (5-10 mg) by mouth every 4 hours as needed for moderate to severe pain (Patient not taking: Reported on 11/10/2023) 20 tablet 0    silver sulfADIAZINE (SILVADENE) 1 % external cream Apply topically daily Apply daily (or more frequently if needed) to perianal area.  Do this for seven days and then as needed. 50 g 1       ROS:  A complete review of systems was performed with the patient and all systems negative except as per HPI.    Physical Examination:  Exam was chaperoned by Mary Hoffmann CMA  There were no vitals taken for this visit.  General: Well hydrated. No acute distress.  Abdomen: Soft, NT. No inguinal adenopathy palpated.  Perianal external examination:  Perianal skin: excellent, the areas of excision are all well healed and look great; there is no evidence of any condylomas in the perianal skin.  Lesions: No evidence of an external lesion,  nodularity, or induration in the perianal region.  Eversion of buttocks: There was not evidence of an anal fissure. Details: N/A.  Skin tags or external hemorrhoids: None.  Digital rectal examination: Was performed.   Sphincter tone: Good.  Palpable lesions: Yes - Location: very small verrucous appearing lesions that are in the anal canal - nothing large, nothing suspicious looking.  Other: None.  Bimanual examination: was not performed.    Anoscopy: Was performed.   Hemorrhoids: No significant internal hemorrhoids.  Lesions: small verrucous appearing masses in anal canal; they are small     Procedures:  None today.    ASSESSMENT  32 y/o gentleman with very large condyloma s/p excision, with small foci of invasive SCC and lots of AIN3.     Risks, benefits, and alternatives of operative treatment were thoroughly discussed with the patient, he/she understands these well and agrees to proceed.    PLAN  1. OR for HRA  2. Discussed at length with anal neoplasia colleagues - at this point would not recommend Thaddeus protocol for this limited disease and agree with very careful surveillance with HRA    30 minutes spent on the date of the encounter doing chart review, history and exam, imaging review, documentation and further activities as noted above.      Referring Provider:  No referring provider defined for this encounter.     Primary Care Provider:  Madie Turner          Again, thank you for allowing me to participate in the care of your patient.      Sincerely,    Sal Bender MD

## 2023-12-15 ENCOUNTER — TELEPHONE (OUTPATIENT)
Dept: SURGERY | Facility: CLINIC | Age: 33
End: 2023-12-15
Payer: COMMERCIAL

## 2023-12-15 NOTE — TELEPHONE ENCOUNTER
On 12/15/2023 at 11:46 AM:    Patient is scheduled for surgery with Dr. Sal Bender     Spoke with: Foster    Date of Surgery: Thurs 1/18/2024    Location: [facility] OR    Informed patient they will need an adult  YES    Pre op with Provider Dr. Sal Bender     H&P: Scheduled with PAC on 1/5/2024 at 1:45 PM    Surgery packet: sent via [employer] Mail     Kizzy Waldron  Leanne-op Coordinator  [facility]  Direct Phone: [phone]

## 2023-12-19 NOTE — TELEPHONE ENCOUNTER
FUTURE VISIT INFORMATION      SURGERY INFORMATION:  Date: 1/18/24  [facility]  Surgeon:  Sal Bender MD   Anesthesia Type:  MAC  Procedure: EXAM UNDER ANESTHESIA, RECTUM, ANOSCOPY, HIGH RESOLUTION, WITH anal dysplasia BIOPSIES   Consult: ov 12/12/23    RECORDS REQUESTED FROM:       Primary Care Provider:   Tess Geller MD- [employer]

## 2023-12-29 NOTE — TELEPHONE ENCOUNTER
FUTURE VISIT INFORMATION        SURGERY INFORMATION:  Date: 1/18/24  [facility]  Surgeon:  Sal Bender MD   Anesthesia Type:  MAC  Procedure: EXAM UNDER ANESTHESIA, RECTUM, ANOSCOPY, HIGH RESOLUTION, WITH anal dysplasia BIOPSIES   Consult: ov 12/12/23     RECORDS REQUESTED FROM:         Primary Care Provider:   Tess Geller MD- [employer]

## 2023-12-29 NOTE — TELEPHONE ENCOUNTER
FUTURE VISIT INFORMATION        SURGERY INFORMATION:  Date: 1/18/24  [facility]  Surgeon:  Sal Bender MD   Anesthesia Type:  MAC  Procedure: EXAM UNDER ANESTHESIA, RECTUM, ANOSCOPY, HIGH RESOLUTION, WITH anal dysplasia BIOPSIES   Consult: ov 12/12/23     RECORDS REQUESTED FROM:         Primary Care Provider:   Tess Geller MD- "[employer]"

## 2024-01-03 DIAGNOSIS — Z21 HIV-1 (HUMAN IMMUNODEFICIENCY VIRUS I) (H): Primary | ICD-10-CM

## 2024-01-04 ENCOUNTER — ANESTHESIA EVENT (OUTPATIENT)
Dept: SURGERY | Facility: AMBULATORY SURGERY CENTER | Age: 34
End: 2024-01-04
Payer: COMMERCIAL

## 2024-01-04 ENCOUNTER — PRE VISIT (OUTPATIENT)
Dept: SURGERY | Facility: CLINIC | Age: 34
End: 2024-01-04

## 2024-01-04 ENCOUNTER — LAB (OUTPATIENT)
Dept: LAB | Facility: CLINIC | Age: 34
End: 2024-01-04
Payer: COMMERCIAL

## 2024-01-04 ENCOUNTER — OFFICE VISIT (OUTPATIENT)
Dept: SURGERY | Facility: CLINIC | Age: 34
End: 2024-01-04
Payer: COMMERCIAL

## 2024-01-04 VITALS
TEMPERATURE: 98 F | HEART RATE: 106 BPM | SYSTOLIC BLOOD PRESSURE: 132 MMHG | HEIGHT: 72 IN | BODY MASS INDEX: 23.57 KG/M2 | DIASTOLIC BLOOD PRESSURE: 82 MMHG | OXYGEN SATURATION: 95 % | WEIGHT: 174 LBS

## 2024-01-04 DIAGNOSIS — Z21 HIV-1 (HUMAN IMMUNODEFICIENCY VIRUS I) (H): ICD-10-CM

## 2024-01-04 DIAGNOSIS — Z01.818 PRE-OP EVALUATION: Primary | ICD-10-CM

## 2024-01-04 LAB
ALBUMIN SERPL BCG-MCNC: 4.8 G/DL (ref 3.5–5.2)
ALP SERPL-CCNC: 115 U/L (ref 40–150)
ALT SERPL W P-5'-P-CCNC: 94 U/L (ref 0–70)
ANION GAP SERPL CALCULATED.3IONS-SCNC: 11 MMOL/L (ref 7–15)
AST SERPL W P-5'-P-CCNC: 44 U/L (ref 0–45)
BASOPHILS # BLD AUTO: 0 10E3/UL (ref 0–0.2)
BASOPHILS NFR BLD AUTO: 1 %
BILIRUB SERPL-MCNC: 0.8 MG/DL
BUN SERPL-MCNC: 12.6 MG/DL (ref 6–20)
CALCIUM SERPL-MCNC: 9.9 MG/DL (ref 8.6–10)
CD3 CELLS # BLD: 493 CELLS/UL (ref 603–2990)
CD3 CELLS NFR BLD: 57 % (ref 49–84)
CD3+CD4+ CELLS # BLD: 194 CELLS/UL (ref 441–2156)
CD3+CD4+ CELLS NFR BLD: 23 % (ref 28–63)
CD3+CD4+ CELLS/CD3+CD8+ CLL BLD: 0.71 % (ref 1.4–2.6)
CD3+CD8+ CELLS # BLD: 272 CELLS/UL (ref 125–1312)
CD3+CD8+ CELLS NFR BLD: 32 % (ref 10–40)
CHLORIDE SERPL-SCNC: 98 MMOL/L (ref 98–107)
CREAT SERPL-MCNC: 0.67 MG/DL (ref 0.67–1.17)
DEPRECATED HCO3 PLAS-SCNC: 30 MMOL/L (ref 22–29)
EGFRCR SERPLBLD CKD-EPI 2021: >90 ML/MIN/1.73M2
EOSINOPHIL # BLD AUTO: 0.1 10E3/UL (ref 0–0.7)
EOSINOPHIL NFR BLD AUTO: 2 %
ERYTHROCYTE [DISTWIDTH] IN BLOOD BY AUTOMATED COUNT: 11.9 % (ref 10–15)
GLUCOSE SERPL-MCNC: 103 MG/DL (ref 70–99)
HCT VFR BLD AUTO: 45.6 % (ref 40–53)
HGB BLD-MCNC: 16.2 G/DL (ref 13.3–17.7)
IMM GRANULOCYTES # BLD: 0 10E3/UL
IMM GRANULOCYTES NFR BLD: 0 %
LYMPHOCYTES # BLD AUTO: 0.8 10E3/UL (ref 0.8–5.3)
LYMPHOCYTES NFR BLD AUTO: 22 %
MCH RBC QN AUTO: 32 PG (ref 26.5–33)
MCHC RBC AUTO-ENTMCNC: 35.5 G/DL (ref 31.5–36.5)
MCV RBC AUTO: 90 FL (ref 78–100)
MONOCYTES # BLD AUTO: 0.3 10E3/UL (ref 0–1.3)
MONOCYTES NFR BLD AUTO: 8 %
NEUTROPHILS # BLD AUTO: 2.5 10E3/UL (ref 1.6–8.3)
NEUTROPHILS NFR BLD AUTO: 67 %
NRBC # BLD AUTO: 0 10E3/UL
NRBC BLD AUTO-RTO: 0 /100
PLATELET # BLD AUTO: 253 10E3/UL (ref 150–450)
POTASSIUM SERPL-SCNC: 3.9 MMOL/L (ref 3.4–5.3)
PROT SERPL-MCNC: 8.4 G/DL (ref 6.4–8.3)
RBC # BLD AUTO: 5.07 10E6/UL (ref 4.4–5.9)
SODIUM SERPL-SCNC: 139 MMOL/L (ref 135–145)
T CELL COMMENT: ABNORMAL
WBC # BLD AUTO: 3.8 10E3/UL (ref 4–11)

## 2024-01-04 PROCEDURE — 99203 OFFICE O/P NEW LOW 30 MIN: CPT | Performed by: NURSE PRACTITIONER

## 2024-01-04 PROCEDURE — 86359 T CELLS TOTAL COUNT: CPT | Performed by: INTERNAL MEDICINE

## 2024-01-04 PROCEDURE — 85025 COMPLETE CBC W/AUTO DIFF WBC: CPT | Performed by: PATHOLOGY

## 2024-01-04 PROCEDURE — 36415 COLL VENOUS BLD VENIPUNCTURE: CPT | Performed by: PATHOLOGY

## 2024-01-04 PROCEDURE — 99000 SPECIMEN HANDLING OFFICE-LAB: CPT | Performed by: PATHOLOGY

## 2024-01-04 PROCEDURE — 80053 COMPREHEN METABOLIC PANEL: CPT | Performed by: PATHOLOGY

## 2024-01-04 PROCEDURE — 87536 HIV-1 QUANT&REVRSE TRNSCRPJ: CPT | Performed by: INTERNAL MEDICINE

## 2024-01-04 ASSESSMENT — PAIN SCALES - GENERAL: PAINLEVEL: NO PAIN (0)

## 2024-01-04 ASSESSMENT — LIFESTYLE VARIABLES: TOBACCO_USE: 0

## 2024-01-04 NOTE — H&P
Pre-Operative H & P     CC:  Preoperative exam to assess for increased cardiopulmonary risk while undergoing surgery and anesthesia.    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 He has a past medical history of HIV/AIDS (follows with Dr. Madie Turner in [place]). He is now s/p examination under anesthesia with excision/fulguration of anal condyloma with Dr. Bender on 10/25/2023. Pathology was positive for invasive squamous cell carcinoma. He presents today in preparation for the above procedure with Dr. Bender     History is obtained from the patient and chart review    Hx of abnormal bleeding or anti-platelet use: no      Past Medical History  Past Medical History:   Diagnosis Date    Anal warts     Human immunodeficiency virus (HIV) disease (H)        Past Surgical History  Past Surgical History:   Procedure Laterality Date    DENTAL SURGERY      wisdom teeth    EXAM UNDER ANESTHESIA, FULGURATE CONDYLOMA ANUS, COMBINED  05/30/2013    Procedure:  COMBINED EXAM UNDER ANESTHESIA, FULGURATE CONDYLOMA ANUS;  Exam Under Anesthesia of Anus, Excision and Fulguration of Anal Condyloma;  Surgeon: Matias Lopez MD;  Location: [facility] OR    FULGURATE CONDYLOMA RECTUM N/A 10/25/2023    Procedure: exam under anesthesia, excision of anal condyloma;  Surgeon: Sal Bender MD;  Location: [facility] OR       Prior to Admission Medications  Current Outpatient Medications   Medication Sig Dispense Refill    bictegravir-emtricitabine-tenofovir (BIKTARVY) -25 MG per tablet Take 1 tablet by mouth daily (Patient taking differently: Take 1 tablet by mouth every morning) 30 tablet 2    FLUZONE QUADRIVALENT 0.5 ML injection       MODERNA COVID-19 BIVALENT 50 MCG/0.5ML injection       oxyCODONE (ROXICODONE) 5 MG tablet Take 1-2 tablets (5-10 mg) by mouth every 4 hours as needed for moderate to severe pain (Patient not taking: Reported on 11/10/2023) 20 tablet 0    silver sulfADIAZINE (SILVADENE) 1 % external cream Apply topically daily Apply daily (or more frequently if needed) to perianal area.  Do this for seven days and then as needed. 50 g 1       Allergies  No Known Allergies    Social History  Social History     Socioeconomic History    Marital status: Single     Spouse name: Not on file    Number of children: Not on file    Years of education: Not on file    Highest education level: Not on file   Occupational History    Not on file   Tobacco Use    Smoking status: Never     Passive exposure: Never    Smokeless tobacco: Never   Vaping Use    Vaping Use: Never used   Substance and Sexual Activity    Alcohol use: No    Drug use: No    Sexual activity: Yes     Partners: Male     Birth control/protection: Condom   Other Topics Concern    Parent/sibling w/ CABG, MI or angioplasty before 65F 55M? No   Social History Narrative    Not on file     Social Determinants of Health     Financial Resource Strain: Not on file   Food Insecurity: Not on file   Transportation  Needs: Not on file   Physical Activity: Not on file   Stress: Not on file   Social Connections: Not on file   Interpersonal Safety: Not on file   Housing Stability: Not on file       Family History  Family History   Problem Relation Age of Onset    Cancer Paternal Grandmother     Family History Negative No family hx of         no skin disorders    Anesthesia Reaction No family hx of     Deep Vein Thrombosis (DVT) No family hx of        Review of Systems  The complete review of systems is negative other than noted in the HPI or here.   Anesthesia Evaluation   Pt has had prior anesthetic.     No history of anesthetic complications       ROS/MED HX  ENT/Pulmonary:    (-) tobacco use, asthma, sleep apnea and recent URI   Neurologic:  - neg neurologic ROS     Cardiovascular:  - neg cardiovascular ROS     METS/Exercise Tolerance: >4 METS    Hematologic:  - neg hematologic  ROS  (-) history of blood clots and history of blood transfusion   Musculoskeletal:  - neg musculoskeletal ROS     GI/Hepatic: Comment: Anal warts  Hx of false + Hep C test.      (-) GERD and liver diseaseHepatitis: LFTs WNL.   Renal/Genitourinary:  - neg Renal ROS     Endo:  - neg endo ROS     Psychiatric/Substance Use:  - neg psychiatric ROS  (-) alcohol abuse history   Infectious Disease: Comment: Anal warts  HPV      (+)     HIV,       Malignancy: Comment: SCC anal - neg malignancy ROS (+) Malignancy, History of GI.GI CA  Active status post.      Other:  - neg other ROS          /82 (BP Location: Right arm, Patient Position: Chair, Cuff Size: Adult Large)   Pulse 106   Temp 98  F (36.7  C) (Oral)   Ht 1.829 m (6')   Wt 78.9 kg (174 lb)   SpO2 95%   BMI 23.60 kg/m      Physical Exam   Constitutional: Awake, alert, cooperative, no apparent distress, and appears stated age.  Eyes: Pupils equal, round and reactive to light, extra ocular muscles intact, sclera clear, conjunctiva normal.  HENT: Normocephalic, oral pharynx with moist mucus  "membranes, good dentition. No goiter appreciated.   Respiratory: Clear to auscultation bilaterally, no crackles or wheezing.  Cardiovascular: Regular rate and rhythm, normal S1 and S2, and no murmur noted.  Carotids +2, no bruits. No edema. Palpable pulses to radial  DP and PT arteries.   GI: Normal bowel sounds, soft, non-distended, non-tender, no masses palpated, no hepatosplenomegaly.  Surgical scars:   Lymph/Hematologic: No cervical lymphadenopathy and no supraclavicular lymphadenopathy.  Genitourinary:  deferred  Skin: Warm and dry.  No rashes at anticipated surgical site.   Musculoskeletal: Full ROM of neck. There is no redness, warmth, or swelling of the joints. Gross motor strength is normal.    Neurologic: Awake, alert, oriented to name, place and time. Cranial nerves II-XII are grossly intact. Gait is normal.   Neuropsychiatric: Calm, cooperative. Normal affect.     Prior Labs/Diagnostic Studies   All labs and imaging personally reviewed     EKG/ stress test - if available please see in ROS above   No results found.    The patient's records and results personally reviewed by this provider.     Outside records reviewed from: Care Everywhere    LAB/DIAGNOSTIC STUDIES TODAY:    Lab Results   Component Value Date    WBC 3.8 01/04/2024     Lab Results   Component Value Date    RBC 5.07 01/04/2024     Lab Results   Component Value Date    HGB 16.2 01/04/2024    HGB 15.3 05/29/2013     Lab Results   Component Value Date    HCT 45.6 01/04/2024     No components found for: \"MCT\"  Lab Results   Component Value Date    MCV 90 01/04/2024     Lab Results   Component Value Date    MCH 32.0 01/04/2024     Lab Results   Component Value Date    MCHC 35.5 01/04/2024     Lab Results   Component Value Date    RDW 11.9 01/04/2024     Lab Results   Component Value Date     01/04/2024     Last Comprehensive Metabolic Panel:  Lab Results   Component Value Date     01/04/2024    POTASSIUM 3.9 01/04/2024    CHLORIDE 98 " 01/04/2024    CO2 30 (H) 01/04/2024    ANIONGAP 11 01/04/2024     (H) 01/04/2024    BUN 12.6 01/04/2024    CR 0.67 01/04/2024    GFRESTIMATED >90 01/04/2024    DENISHA 9.9 01/04/2024           Assessment    Kamron Headley is a 33 year old male seen as a PAC referral for risk assessment and optimization for anesthesia.    Plan/Recommendations  Pt will be optimized for the proposed procedure.  See below for details on the assessment, risk, and preoperative recommendations    NEUROLOGY  - No history of TIA, CVA or seizure    -Post Op delirium risk factors:  No risk identified    ENT  - No current airway concerns.  Will need to be reassessed day of surgery.  Mallampati: I  TM: > 3    CARDIAC  - No history of CAD, Hypertension, and Afib  No anginal symptoms, Denies palpitations, PND, dizziness or syncope.     - METS (Metabolic Equivalents)  Patient performs 4 or more METS exercise without symptoms            Total Score: 0      RCRI-Very low risk: Class 1 0.4% complication rate            Total Score: 0        PULMONARY    LISBETH Low Risk            Total Score: 1    LISBETH: Male        - Tobacco History    History   Smoking Status    Never   Smokeless Tobacco    Never       GI  Patient reports his Hep C testing was a false positive.  LFT's WNL    PONV Medium Risk  Total Score: 2           1 AN PONV: Patient is not a current smoker    1 AN PONV: Intended Post Op Opioids        /RENAL  - Baseline Creatinine  WNL      ENDOCRINE    - BMI: Estimated body mass index is 23.6 kg/m  as calculated from the following:    Height as of this encounter: 1.829 m (6').    Weight as of this encounter: 78.9 kg (174 lb).  Healthy Weight (BMI 18.5-24.9)  - No history of Diabetes Mellitus    HEME/IMMUNE/ONC  VTE Low Risk 0.5%            Total Score: 2    VTE: Male      - No history of abnormal bleeding or antiplatelet use.  - HIV, continue Biktarvy  - Anal SCC  - Anal warts- Procedure scheduled as above.       Different anesthesia  methods/types have been discussed with the patient, but they are aware that the final plan will be decided by the assigned anesthesia provider on the date of service.      The patient is optimized for their procedure. AVS with information on surgery time/arrival time, meds and NPO status given by nursing staff. No further diagnostic testing indicated.      On the day of service:     Prep time: 10 minutes  Visit time: 20 minutes  Documentation time: 10 minutes  ------------------------------------------  Total time: 40 minutes      DONTE Shankar CNP  Preoperative Assessment Center  [facility]  Clinic and Surgery Center  Phone: [phone]  Fax: [phone]

## 2024-01-04 NOTE — PATIENT INSTRUCTIONS
Preparing for Your Surgery      Name:  Kamron Headley   MRN:  [id number]   :  1990   Today's Date:  2024         Arriving for surgery:  Surgery date:  24  Arrival time:  9.45AM    Restrictions due to COVID 19:    Please maintain social distance.  Masking is optional.      parking is available for anyone with mobility limitations or disabilities. (Monday- Friday 7 am- 5 pm)    Please come to:    [facility]  [address] [place] [id number]    Please check in on the 5th floor at the Ambulatory Surgery Center.      What can I eat or drink?    -  You may eat and drink normally until 8 hours prior to arrival  time. (Until 1.45AM)  -  You may have clear liquids until 2 hours prior to arrival  time. (Until 7.45AM)    Examples of clear liquids:  Water  Clear broth  Juices (apple, white grape, white cranberry  and cider) without pulp  Noncarbonated, powder based beverages  (lemonade and Ravinder-Aid)  Sodas (Sprite, 7-Up, ginger ale and seltzer)  Coffee or tea (without milk or cream)  Gatorade      Which medicines can I take?    Hold Aspirin for 7 days before surgery.   Hold Multivitamins for 7 days before surgery.  Hold Supplements for 7 days before surgery.  Hold Ibuprofen (Advil, Motrin) for 1 day before surgery--unless otherwise directed by surgeon.  Hold Naproxen (Aleve) for 4 days before surgery.    No alcohol or cannabis products for 24 hours prior to procedure.      -  DO NOT take the following medications the day of surgery:  External cream    -  PLEASE TAKE the following medications the day of surgery:   Biktarvy, Oxycodone as needed.    How do I prepare myself?  - Please take 2 showers (one the night prior to surgery and one the morning of surgery) using Scrubcare or Hibiclens soap.    Use this soap only from the neck to your toes.     Leave the soap on your skin for one minute--then rinse thoroughly.      You may use your own shampoo and conditioner. No  other hair products.   - Please remove all jewelry and body piercings.  - No lotions, deodorants or fragrance.  - No makeup or fingernail polish.   - Bring your ID and insurance card.    -If you have a Deep Brain Stimulator, a Spinal Cord Stimulator, or any implanted Neuro Device, you must bring the remote to the Surgery Center.         ALL PATIENTS ARE REQUIRED TO HAVE A RESPONSIBLE ADULT TO DRIVE AND BE IN ATTENDANCE WITH THEM FOR 24 HOURS FOLLOWING SURGERY.     Covid testing policy as of 12/06/2022  Your surgeon will notify and schedule you for a COVID test if one is needed before surgery--please direct any questions or COVID symptoms to your surgeon      Questions or Concerns:    -For questions regarding the day of surgery, please contact the Ambulatory Surgery Center at [phone].    -If you have health changes between today and your surgery, please contact your surgeon.     - For questions after surgery, please contact your surgeon's office.

## 2024-01-05 ENCOUNTER — PRE VISIT (OUTPATIENT)
Dept: SURGERY | Facility: CLINIC | Age: 34
End: 2024-01-05

## 2024-01-06 LAB
HIV1 RNA # PLAS NAA DL=20: 30 COPIES/ML
HIV1 RNA SERPL NAA+PROBE-LOG#: 1.5 {LOG_COPIES}/ML

## 2024-01-10 NOTE — PROGRESS NOTES
[facility]  General Infectious Diseases/HIV Progress Note     Patient:  Kamron Headley, Date of birth 1990  Medical record number [id number]  Date of Visit:  01/10/2024    Assessment and Plan:   1. HIV/AIDS, VL 30 (considered undetectable); CD4 194/23%   - Continue Biktarvy.   2. 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 Bender   3. Major aphthous ulcer (recurrent)   - Today: plan to send Histo, Blasto, Vitamin D, Treponemal Ab, TTG.     - Restart topical clobetasol X2 weeks minimum.     - If no improvement after 2 weeks, will need additional biopsy.                - 4/2023 Chronic inflammation on path, cultures negative, organism staining negative. Improved with topical clobetasol.   4. Mild transaminitis - low level elevation persists, will continue to monitor  5. Penile warts - resolved  6. Hepatitis B nonimmune, nonexposed               - S/p revaccination. Check HepB surface Ab today    From an HIV perspective, Kamron is doing well. His CD4 has dipped slightly below 200, but I suspect this is a variation over time and possibly related to recent surgery and other immune stressors. His VL remains suppressed so I do not suspect this is 2/2 HIV and will likely improve at next check. No current change to medications, and as it is so close to 200, I do not think it is necessary to re-initiate bactrim ppx.     Kamron's aphthous ulcers have unfortunately returned. We did an extensive workup and biopsy the last time they were present and did not find a clear infectious or malignant diagnosis. They resolved with time + topical clobetasol. As we have already done an extensive workup and his CD4 is much higher than previously, I would like to try topical therapy again prior to re-biopsy. He has enough clobetasol to try a 2 week trial. In the meantime we will screen for Histo (given lung nodules as  well), Blasto, syphilis (though he denies oral sexual contact), vitamin D, and screen for Celiac's. If there is no improvement in 2 weeks and our labs are not revealing, will refer for biopsy again. (PMID: [id number])    Health care maintenance:  Vaccines due: did not address at today's visit. Due for meningococcal, shingrix.   Labs today: Hep B Surface Ab, vitamin D, syphilis, celiac screen, Histo, Blasto    Follow up: 3 months with labs    I spent a total of 67 minutes on the day of the visit.   Time spent by me doing chart review, history and exam, documentation and further activities per the note      Madie Turner MD.   Pager [phone]  Infectious Diseases        Interval History:   -Last seen: 8/25/23  -Current HIV regimen: Biktarvy      -How many missed doses in 4 weeks: none  -Since the last visit:     - Saw Dr. Bender on 10/10/23. Underwent excision of anal condyloma on 10/25/23. Pathology returned with HPV-associated invasive SCC, with background high grade MARNIE.   - Staging imaging was performed 11/24/23. CT chest with multiple sub-3 mm nodules in the RUL. MRI pelvis with no residual anal mass, no suspicious pelvic LAD.   - Plan for OR for HRA w/ Dr. Bender on 1/18/24.   - Pt reports surgical site healing very well. No current issues.   - Does note 2 new oral ulcerations. One (upper R) he recalls trauma prior, the second (L lower gums) he does not. They have been present around a month, not changing much. Tried topical steroid for 2 days but didn't see much improvement. Denies oral sexual exposure.   - Planning to re-enroll in school, prerequisites for now, but wants to ultimately pursue PharmD.   - Denies respiratory symptoms, cough, recent illness.     Review of Systems:Remaining systems all reviewed and negative.    Past Medical History:  HIV/AIDS  Major Aphthous Ulcers  HPV-associated anal SCC    HIV:  -diagnosis: 2016  -previous ART regimens: none  -resistance: none 2/24/23     A.  Vaccination/Serology status.   -Hepatitis A: Immune  -Hepatitis B: 5/26/23; 6/27/23; 7/25/23, 11/2023  -Strep pneumo: PCV23 2023  -TDaP: 2023  -Zoster: due (will wait until consistently CD4 >200)  -Meningococcal: due (previously had menomune)  -Influenza   -COVID: up to date  -Mpox: 8/2022; 9/2022     B. Routine Infectious Disease screening.   -Hepatitis C: negative (2023)   -Toxo IgG: negative (2023)  -CMV IGG: negative (2023)   -TB screen: negative quant gold 5/2023  -STD sceen: GC/CT: negative; RPR negative (2023);   -HLA-B*57-01: negative  -Resistance: pan-S (2/24/23)     C. Cardiovascular, renal and bone health.   -Cholesterol: wnl (2023)  -Blood pressure: 119/76     D. Cancer screening (if applicable)  -Pap smear: +HPV condyloma, awaiting colorectal surgery evaluation    Medications:  Current Outpatient Medications   Medication Sig Dispense Refill    bictegravir-emtricitabine-tenofovir (BIKTARVY) -25 MG per tablet Take 1 tablet by mouth daily (Patient taking differently: Take 1 tablet by mouth every morning) 30 tablet 2    FLUZONE QUADRIVALENT 0.5 ML injection       MODERNA COVID-19 BIVALENT 50 MCG/0.5ML injection       silver sulfADIAZINE (SILVADENE) 1 % external cream Apply topically daily Apply daily (or more frequently if needed) to perianal area.  Do this for seven days and then as needed. 50 g 1       No Known Allergies    Immunizations:  Immunization History   Administered Date(s) Administered    COVID-19 Bivalent 18+ (Moderna) 12/17/2022, 08/25/2023    COVID-19 Monovalent 18+ (Moderna) 04/15/2021, 05/13/2021, 12/01/2021    DTAP (<7y) 05/03/1995    HEPA 08/22/2008, 10/29/2012    HIB (PRP-T) 03/22/1991, 05/17/1991, 09/16/1991    HPV9 02/16/2023, 11/10/2023    HepB 09/10/1999, 11/01/1999, 05/01/2000    Hepatitis B, Adult 05/26/2023, 06/27/2023, 07/25/2023, 11/28/2023    Influenza Vaccine >6 months,quad, PF 12/17/2022    MMR 09/10/1999    Meningococcal (Menomune ) 08/22/2008    OPV, trivalent, live  05/03/1995    Pneumococcal 23 valent 08/25/2023    Smallpox/Mpox Vaccine Subcutaneous (JYNNEOS) 08/19/2022, 09/28/2022    TD,PF 7+ (Tenivac) 06/17/2002    TDAP (Adacel,Boostrix) 08/25/2023    TDAP Vaccine (Adacel) 10/29/2012       Exam:  /79   Pulse 72   Temp 97.6  F (36.4  C) (Oral)   Ht 1.829 m (6')   Wt 79.8 kg (176 lb)   SpO2 97%   BMI 23.87 kg/m     Wt Readings from Last 2 Encounters:   01/04/24 78.9 kg (174 lb)   12/12/23 78.6 kg (173 lb 3.2 oz)      Physical Examination:  GENERAL:  well-appearing. no acute distress.  HEAD:  Head is normocephalic, atraumatic   EYES:  Eyes have anicteric sclerae without conjunctival injection. Pupils reactive bilaterally.   ENT:  Oropharynx is moist, 2 mm superficial ulceration in R maxillary region, 10 mm lesion in L mandibular region.  NECK:  Supple. No cervical lymphadenopathy  LUNGS:  Normal WOB on RA.   SKIN:  No acute rashes visible.    EXTREMITIES: No joint erythema or swelling.   NEUROLOGIC:  Grossly nonfocal. Active x4 extremities. Alert. Oriented.          Laboratory Data:     Cd4 Total Cd4%   CD4% Isle T Cells   Date Value Ref Range Status   01/04/2024 23 (L) 28 - 63 % Final   11/28/2023 26 (L) 28 - 63 % Final   08/25/2023 24 (L) 28 - 63 % Final   05/26/2023 23 (L) 28 - 63 % Final   02/16/2023 16 (L) 28 - 63 % Final   ( Absolute CD4, Isle T Cells   Date Value Ref Range Status   01/04/2024 194 (L) 441 - 2,156 cells/uL Final   11/28/2023 231 (L) 441 - 2,156 cells/uL Final   08/25/2023 214 (L) 441 - 2,156 cells/uL Final   05/26/2023 163 (L) 441 - 2,156 cells/uL Final   02/16/2023 112 (L) 441 - 2,156 cells/uL Final   (       HIV-1 RNA Quantitative:  HIV-1 RNA Copies/mL, Instrument   Date Value Ref Range Status   01/04/2024 30 (H) <1 copies/mL Final   05/26/2023 36 (H) <1 copies/mL Final   02/24/2023 28,938 (H) <1 copies/mL Final   02/16/2023 23,336 (H) <1 copies/mL Final     HIV-1 RNA copies/mL   Date Value Ref Range Status   11/28/2023 <20 (A) Not  Detected Copies/mL Final   2023 Not Detected Not Detected Copies/mL Final   2023 <20 (A) Not Detected Copies/mL Final   (    Metabolic Studies       Recent Labs   Lab Test 24  1125 23  1548    141   POTASSIUM 3.9 4.2   CHLORIDE 98 100   CO2 30* 31*   ANIONGAP 11 10   BUN 12.6 8.4   CR 0.67 0.59*   GFRESTIMATED >90 >90   * 91   DENISHA 9.9 10.0       Hepatic Studies    Recent Labs   Lab Test 24  1125 23  1548 23  0950   BILITOTAL 0.8 0.5 0.8   ALKPHOS 115 102 95   PROTTOTAL 8.4* 7.9 7.9   ALBUMIN 4.8 4.7 5.0   AST 44 32 49*   ALT 94* 77* 103*       Hematology Studies      Recent Labs   Lab Test 24  1125 23  1548 23  0950 23  1216 23  1029 23  1119   WBC 3.8* 4.6 3.7* 4.0 3.5* 3.3*   HGB 16.2 15.8 16.3 15.1 15.4 14.5   HCT 45.6 45.4 45.5 43.6 44.4 42.1    235 223 231 206 202     Imagin23 MRI Pelvis  IMPRESSION: No appreciable residual anal mass. No suspicious pelvic or  inguinal lymphadenopathy.    23 CT Chest  IMPRESSION: Multiple scattered sub-3 mm nodules with particular note  taken of the tree-in-bud nodules with surrounding ground glass  opacities in the right upper lobe. Likely infectious. In the context  of diagnosis of anal squamous cell carcinoma, cannot exclude  metastatic malignancy. Recommend short-term follow-up with CT chest in  3 months to assess stability.

## 2024-01-12 ENCOUNTER — OFFICE VISIT (OUTPATIENT)
Dept: INFECTIOUS DISEASES | Facility: CLINIC | Age: 34
End: 2024-01-12
Attending: INTERNAL MEDICINE
Payer: COMMERCIAL

## 2024-01-12 VITALS
BODY MASS INDEX: 23.84 KG/M2 | DIASTOLIC BLOOD PRESSURE: 79 MMHG | WEIGHT: 176 LBS | OXYGEN SATURATION: 97 % | SYSTOLIC BLOOD PRESSURE: 119 MMHG | HEART RATE: 72 BPM | HEIGHT: 72 IN | TEMPERATURE: 97.6 F

## 2024-01-12 DIAGNOSIS — Z21 HIV-1 (HUMAN IMMUNODEFICIENCY VIRUS I) (H): Primary | ICD-10-CM

## 2024-01-12 PROCEDURE — 99417 PROLNG OP E/M EACH 15 MIN: CPT | Performed by: INTERNAL MEDICINE

## 2024-01-12 PROCEDURE — 99215 OFFICE O/P EST HI 40 MIN: CPT | Performed by: INTERNAL MEDICINE

## 2024-01-12 PROCEDURE — 99213 OFFICE O/P EST LOW 20 MIN: CPT | Performed by: INTERNAL MEDICINE

## 2024-01-12 ASSESSMENT — PAIN SCALES - GENERAL: PAINLEVEL: NO PAIN (0)

## 2024-01-12 NOTE — LETTER
1/12/2024       RE: Kamron Headley  [address]  [facility] [address]     Dear Colleague,    Thank you for referring your patient, Kamron Headley, to the [facility] [place] at [facility]. Please see a copy of my visit note below.    [facility]  General Infectious Diseases/HIV Progress Note     Patient:  Kamron Headley, Date of birth 1990  Medical record number [id number]  Date of Visit:  01/10/2024    Assessment and Plan:   1. HIV/AIDS, VL 30 (considered undetectable); CD4 194/23%   - Continue Biktarvy.   2. 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 Bender   3. Major aphthous ulcer (recurrent)   - Today: plan to send Histo, Blasto, Vitamin D, Treponemal Ab, TTG.     - Restart topical clobetasol X2 weeks minimum.     - If no improvement after 2 weeks, will need additional biopsy.                - 4/2023 Chronic inflammation on path, cultures negative, organism staining negative. Improved with topical clobetasol.   4. Mild transaminitis - low level elevation persists, will continue to monitor  5. Penile warts - resolved  6. Hepatitis B nonimmune, nonexposed               - S/p revaccination. Check HepB surface Ab today    From an HIV perspective, Kamron is doing well. His CD4 has dipped slightly below 200, but I suspect this is a variation over time and possibly related to recent surgery and other immune stressors. His VL remains suppressed so I do not suspect this is 2/2 HIV and will likely improve at next check. No current change to medications, and as it is so close to 200, I do not think it is necessary to re-initiate bactrim ppx.     Kamron's aphthous ulcers have unfortunately returned. We did an extensive workup and biopsy the last time they were present and did not find a clear infectious or  malignant diagnosis. They resolved with time + topical clobetasol. As we have already done an extensive workup and his CD4 is much higher than previously, I would like to try topical therapy again prior to re-biopsy. He has enough clobetasol to try a 2 week trial. In the meantime we will screen for Histo (given lung nodules as well), Blasto, syphilis (though he denies oral sexual contact), vitamin D, and screen for Celiac's. If there is no improvement in 2 weeks and our labs are not revealing, will refer for biopsy again. (PMID: 35088053)    Health care maintenance:  Vaccines due: did not address at today's visit. Due for meningococcal, shingrix.   Labs today: Hep B Surface Ab, vitamin D, syphilis, celiac screen, Histo, Blasto    Follow up: 3 months with labs    I spent a total of 67 minutes on the day of the visit.   Time spent by me doing chart review, history and exam, documentation and further activities per the note      Madie Turner MD.   Pager [phone]  Infectious Diseases        Interval History:   -Last seen: 8/25/23  -Current HIV regimen: Biktarvy      -How many missed doses in 4 weeks: none  -Since the last visit:     - Saw Dr. Bender on 10/10/23. Underwent excision of anal condyloma on 10/25/23. Pathology returned with HPV-associated invasive SCC, with background high grade MARNIE.   - Staging imaging was performed 11/24/23. CT chest with multiple sub-3 mm nodules in the RUL. MRI pelvis with no residual anal mass, no suspicious pelvic LAD.   - Plan for OR for HRA w/ Dr. Bender on 1/18/24.   - Pt reports surgical site healing very well. No current issues.   - Does note 2 new oral ulcerations. One (upper R) he recalls trauma prior, the second (L lower gums) he does not. They have been present around a month, not changing much. Tried topical steroid for 2 days but didn't see much improvement. Denies oral sexual exposure.   - Planning to re-enroll in school, prerequisites for now, but wants to  ultimately pursue PharmD.   - Denies respiratory symptoms, cough, recent illness.     Review of Systems:Remaining systems all reviewed and negative.    Past Medical History:  HIV/AIDS  Major Aphthous Ulcers  HPV-associated anal SCC    HIV:  -diagnosis: 2016  -previous ART regimens: none  -resistance: none 2/24/23     A. Vaccination/Serology status.   -Hepatitis A: Immune  -Hepatitis B: 5/26/23; 6/27/23; 7/25/23, 11/2023  -Strep pneumo: PCV23 2023  -TDaP: 2023  -Zoster: due (will wait until consistently CD4 >200)  -Meningococcal: due (previously had menomune)  -Influenza   -COVID: up to date  -Mpox: 8/2022; 9/2022     B. Routine Infectious Disease screening.   -Hepatitis C: negative (2023)   -Toxo IgG: negative (2023)  -CMV IGG: negative (2023)   -TB screen: negative quant gold 5/2023  -STD sceen: GC/CT: negative; RPR negative (2023);   -HLA-B*57-01: negative  -Resistance: pan-S (2/24/23)     C. Cardiovascular, renal and bone health.   -Cholesterol: wnl (2023)  -Blood pressure: 119/76     D. Cancer screening (if applicable)  -Pap smear: +HPV condyloma, awaiting colorectal surgery evaluation    Medications:  Current Outpatient Medications   Medication Sig Dispense Refill    bictegravir-emtricitabine-tenofovir (BIKTARVY) -25 MG per tablet Take 1 tablet by mouth daily (Patient taking differently: Take 1 tablet by mouth every morning) 30 tablet 2    FLUZONE QUADRIVALENT 0.5 ML injection       MODERNA COVID-19 BIVALENT 50 MCG/0.5ML injection       silver sulfADIAZINE (SILVADENE) 1 % external cream Apply topically daily Apply daily (or more frequently if needed) to perianal area.  Do this for seven days and then as needed. 50 g 1       No Known Allergies    Immunizations:  Immunization History   Administered Date(s) Administered    COVID-19 Bivalent 18+ (Moderna) 12/17/2022, 08/25/2023    COVID-19 Monovalent 18+ (Moderna) 04/15/2021, 05/13/2021, 12/01/2021    DTAP (<7y) 05/03/1995    HEPA 08/22/2008, 10/29/2012     HIB (PRP-T) 03/22/1991, 05/17/1991, 09/16/1991    HPV9 02/16/2023, 11/10/2023    HepB 09/10/1999, 11/01/1999, 05/01/2000    Hepatitis B, Adult 05/26/2023, 06/27/2023, 07/25/2023, 11/28/2023    Influenza Vaccine >6 months,quad, PF 12/17/2022    MMR 09/10/1999    Meningococcal (Menomune ) 08/22/2008    OPV, trivalent, live 05/03/1995    Pneumococcal 23 valent 08/25/2023    Smallpox/Mpox Vaccine Subcutaneous (JYNNEOS) 08/19/2022, 09/28/2022    TD,PF 7+ (Tenivac) 06/17/2002    TDAP (Adacel,Boostrix) 08/25/2023    TDAP Vaccine (Adacel) 10/29/2012       Exam:  /79   Pulse 72   Temp 97.6  F (36.4  C) (Oral)   Ht 1.829 m (6')   Wt 79.8 kg (176 lb)   SpO2 97%   BMI 23.87 kg/m     Wt Readings from Last 2 Encounters:   01/04/24 78.9 kg (174 lb)   12/12/23 78.6 kg (173 lb 3.2 oz)      Physical Examination:  GENERAL:  well-appearing. no acute distress.  HEAD:  Head is normocephalic, atraumatic   EYES:  Eyes have anicteric sclerae without conjunctival injection. Pupils reactive bilaterally.   ENT:  Oropharynx is moist, 2 mm superficial ulceration in R maxillary region, 10 mm lesion in L mandibular region.  NECK:  Supple. No cervical lymphadenopathy  LUNGS:  Normal WOB on RA.   SKIN:  No acute rashes visible.    EXTREMITIES: No joint erythema or swelling.   NEUROLOGIC:  Grossly nonfocal. Active x4 extremities. Alert. Oriented.          Laboratory Data:     Cd4 Total Cd4%   CD4% Morley T Cells   Date Value Ref Range Status   01/04/2024 23 (L) 28 - 63 % Final   11/28/2023 26 (L) 28 - 63 % Final   08/25/2023 24 (L) 28 - 63 % Final   05/26/2023 23 (L) 28 - 63 % Final   02/16/2023 16 (L) 28 - 63 % Final   ( Absolute CD4, Morley T Cells   Date Value Ref Range Status   01/04/2024 194 (L) 441 - 2,156 cells/uL Final   11/28/2023 231 (L) 441 - 2,156 cells/uL Final   08/25/2023 214 (L) 441 - 2,156 cells/uL Final   05/26/2023 163 (L) 441 - 2,156 cells/uL Final   02/16/2023 112 (L) 441 - 2,156 cells/uL Final   (       HIV-1 RNA  Quantitative:  HIV-1 RNA Copies/mL, Instrument   Date Value Ref Range Status   2024 30 (H) <1 copies/mL Final   2023 36 (H) <1 copies/mL Final   2023 28,938 (H) <1 copies/mL Final   2023 23,336 (H) <1 copies/mL Final     HIV-1 RNA copies/mL   Date Value Ref Range Status   2023 <20 (A) Not Detected Copies/mL Final   2023 Not Detected Not Detected Copies/mL Final   2023 <20 (A) Not Detected Copies/mL Final   (    Metabolic Studies       Recent Labs   Lab Test 24  1125 23  1548    141   POTASSIUM 3.9 4.2   CHLORIDE 98 100   CO2 30* 31*   ANIONGAP 11 10   BUN 12.6 8.4   CR 0.67 0.59*   GFRESTIMATED >90 >90   * 91   DENISHA 9.9 10.0       Hepatic Studies    Recent Labs   Lab Test 24  1125 23  1548 23  0950   BILITOTAL 0.8 0.5 0.8   ALKPHOS 115 102 95   PROTTOTAL 8.4* 7.9 7.9   ALBUMIN 4.8 4.7 5.0   AST 44 32 49*   ALT 94* 77* 103*       Hematology Studies      Recent Labs   Lab Test 24  1125 23  1548 23  0950 23  1216 23  1029 23  1119   WBC 3.8* 4.6 3.7* 4.0 3.5* 3.3*   HGB 16.2 15.8 16.3 15.1 15.4 14.5   HCT 45.6 45.4 45.5 43.6 44.4 42.1    235 223 231 206 202     Imagin23 MRI Pelvis  IMPRESSION: No appreciable residual anal mass. No suspicious pelvic or  inguinal lymphadenopathy.    23 CT Chest  IMPRESSION: Multiple scattered sub-3 mm nodules with particular note  taken of the tree-in-bud nodules with surrounding ground glass  opacities in the right upper lobe. Likely infectious. In the context  of diagnosis of anal squamous cell carcinoma, cannot exclude  metastatic malignancy. Recommend short-term follow-up with CT chest in  3 months to assess stability.      Madie Turner MD

## 2024-01-12 NOTE — NURSING NOTE
Chief Complaint   Patient presents with    RECHECK     Follow-up      Vital signs:  Temp: 97.6  F (36.4  C) Temp src: Oral BP: 119/79 Pulse: 72     SpO2: 97 %     Height: 182.9 cm (6') (pt reported) Weight: 79.8 kg (176 lb)  Estimated body mass index is 23.87 kg/m  as calculated from the following:    Height as of this encounter: 1.829 m (6').    Weight as of this encounter: 79.8 kg (176 lb).      Caroline Card CMA   1/12/2024 9:01 AM

## 2024-01-17 ENCOUNTER — TELEPHONE (OUTPATIENT)
Dept: INFECTIOUS DISEASES | Facility: CLINIC | Age: 34
End: 2024-01-17
Payer: COMMERCIAL

## 2024-01-17 ASSESSMENT — LIFESTYLE VARIABLES: TOBACCO_USE: 0

## 2024-01-17 NOTE — ANESTHESIA PREPROCEDURE EVALUATION
Pre-Operative H & P     CC:  Preoperative exam to assess for increased cardiopulmonary risk while undergoing surgery and anesthesia.    Date of Encounter: 1/4/2024  Primary Care Physician:  Madie Turner     Reason for visit: Pre-operative evaluation      [facility]  Kamron Headley is a 33 year old male who presents for pre-operative H & P in preparation for  Procedure Information       Case: [id number] Date/Time: 01/18/24 0945    Procedure: EXAM UNDER ANESTHESIA, RECTUM, ANOSCOPY, HIGH RESOLUTION, WITH anal dysplasia BIOPSIES (Rectum)    Anesthesia type: MAC    Diagnosis:       Anal squamous cell carcinoma (H) [C21.0]      HIV-1 (human immunodeficiency virus I) (H) [B20]      High risk HPV infection [B97.7]      Anal warts [A63.0]    Pre-op diagnosis:       Anal squamous cell carcinoma (H) [C21.0]      HIV-1 (human immunodeficiency virus I) (H) [B20]      High risk HPV infection [B97.7]      Anal warts [A63.0]    Location: [place] [address] / [facility]    Providers: Sal Bender MD            Kamron Headley is a 33-year-old male who presents today for anal squamous cell carcinoma. He has a past medical history of HIV/AIDS (follows with Dr. Madie Turner in ID). He is now s/p examination under anesthesia with excision/fulguration of anal condyloma with Dr. Bender on 10/25/2023. Pathology was positive for invasive squamous cell carcinoma. He presents today in preparation for the above procedure with Dr. Bender     History is obtained from the patient and chart review    Hx of abnormal bleeding or anti-platelet use: no      Past Medical History  Past Medical History:   Diagnosis Date    Anal warts     Human immunodeficiency virus (HIV) disease (H)        Past Surgical History  Past Surgical History:   Procedure Laterality Date    DENTAL SURGERY      wisdom teeth    EXAM UNDER ANESTHESIA, FULGURATE CONDYLOMA ANUS, COMBINED  05/30/2013    Procedure:  COMBINED EXAM UNDER ANESTHESIA, FULGURATE CONDYLOMA ANUS;  Exam Under Anesthesia of Anus, Excision and Fulguration of Anal Condyloma;  Surgeon: Matias Lopez MD;  Location: [facility] OR    FULGURATE CONDYLOMA RECTUM N/A 10/25/2023    Procedure: exam under anesthesia, excision of anal condyloma;  Surgeon: Sal Bender MD;  Location: [facility] OR       Prior to Admission Medications  Current Outpatient Medications   Medication Sig Dispense Refill    bictegravir-emtricitabine-tenofovir (BIKTARVY) -25 MG per tablet Take 1 tablet by mouth daily (Patient taking differently: Take 1 tablet by mouth every morning) 30 tablet 2    FLUZONE QUADRIVALENT 0.5 ML injection       MODERNA COVID-19 BIVALENT 50 MCG/0.5ML injection       silver sulfADIAZINE (SILVADENE) 1 % external cream Apply topically daily Apply daily (or more frequently if needed) to perianal area.  Do this for seven days and then as needed. (Patient not taking: Reported on 1/12/2024) 50 g 1       Allergies  No Known Allergies    Social History  Social History     Socioeconomic History    Marital status: Single     Spouse name: Not on file    Number of children: Not on file    Years of education: Not on file    Highest education level: Not on file   Occupational History    Not on file   Tobacco Use    Smoking status: Never     Passive exposure: Never    Smokeless tobacco: Never   Vaping Use    Vaping Use: Never used   Substance and Sexual Activity    Alcohol use: No    Drug use: No    Sexual activity: Yes     Partners: Male     Birth control/protection: Condom   Other Topics Concern    Parent/sibling w/ CABG, MI or angioplasty before 65F 55M? No   Social History Narrative    Not on file     Social Determinants of Health     Financial Resource Strain: Not on file   Food Insecurity: Not on file   Transportation Needs: Not on file   Physical Activity: Not on file   Stress: Not on file   Social Connections: Not on file   Interpersonal Safety: Not on  file   Housing Stability: Not on file       Family History  Family History   Problem Relation Age of Onset    Cancer Paternal Grandmother     Family History Negative No family hx of         no skin disorders    Anesthesia Reaction No family hx of     Deep Vein Thrombosis (DVT) No family hx of        Review of Systems  The complete review of systems is negative other than noted in the HPI or here.   Anesthesia Evaluation   Pt has had prior anesthetic.     No history of anesthetic complications       ROS/MED HX  ENT/Pulmonary:    (-) tobacco use, asthma, sleep apnea and recent URI   Neurologic:  - neg neurologic ROS     Cardiovascular:  - neg cardiovascular ROS     METS/Exercise Tolerance: >4 METS    Hematologic:  - neg hematologic  ROS  (-) history of blood clots and history of blood transfusion   Musculoskeletal:  - neg musculoskeletal ROS     GI/Hepatic: Comment: Anal warts  Hx of false + Hep C test.      (-) GERD and liver diseaseHepatitis: LFTs WNL.   Renal/Genitourinary:  - neg Renal ROS     Endo:  - neg endo ROS     Psychiatric/Substance Use:  - neg psychiatric ROS  (-) alcohol abuse history   Infectious Disease: Comment: Anal warts  HPV      (+)     HIV,       Malignancy: Comment: SCC anal - neg malignancy ROS (+) Malignancy, History of GI.GI CA  Active status post.      Other:  - neg other ROS          There were no vitals taken for this visit.    Physical Exam   Constitutional: Awake, alert, cooperative, no apparent distress, and appears stated age.  Eyes: Pupils equal, round and reactive to light, extra ocular muscles intact, sclera clear, conjunctiva normal.  HENT: Normocephalic, oral pharynx with moist mucus membranes, good dentition. No goiter appreciated.   Respiratory: Clear to auscultation bilaterally, no crackles or wheezing.  Cardiovascular: Regular rate and rhythm, normal S1 and S2, and no murmur noted.  Carotids +2, no bruits. No edema. Palpable pulses to radial  DP and PT arteries.   GI: Normal  "bowel sounds, soft, non-distended, non-tender, no masses palpated, no hepatosplenomegaly.  Surgical scars:   Lymph/Hematologic: No cervical lymphadenopathy and no supraclavicular lymphadenopathy.  Genitourinary:  deferred  Skin: Warm and dry.  No rashes at anticipated surgical site.   Musculoskeletal: Full ROM of neck. There is no redness, warmth, or swelling of the joints. Gross motor strength is normal.    Neurologic: Awake, alert, oriented to name, place and time. Cranial nerves II-XII are grossly intact. Gait is normal.   Neuropsychiatric: Calm, cooperative. Normal affect.     Prior Labs/Diagnostic Studies   All labs and imaging personally reviewed     EKG/ stress test - if available please see in ROS above   No results found.    The patient's records and results personally reviewed by this provider.     Outside records reviewed from: Care Everywhere    LAB/DIAGNOSTIC STUDIES TODAY:    Lab Results   Component Value Date    WBC 3.8 01/04/2024     Lab Results   Component Value Date    RBC 5.07 01/04/2024     Lab Results   Component Value Date    HGB 16.2 01/04/2024    HGB 15.3 05/29/2013     Lab Results   Component Value Date    HCT 45.6 01/04/2024     No components found for: \"MCT\"  Lab Results   Component Value Date    MCV 90 01/04/2024     Lab Results   Component Value Date    MCH 32.0 01/04/2024     Lab Results   Component Value Date    MCHC 35.5 01/04/2024     Lab Results   Component Value Date    RDW 11.9 01/04/2024     Lab Results   Component Value Date     01/04/2024     Last Comprehensive Metabolic Panel:  Lab Results   Component Value Date     01/04/2024    POTASSIUM 3.9 01/04/2024    CHLORIDE 98 01/04/2024    CO2 30 (H) 01/04/2024    ANIONGAP 11 01/04/2024     (H) 01/04/2024    BUN 12.6 01/04/2024    CR 0.67 01/04/2024    GFRESTIMATED >90 01/04/2024    DENISHA 9.9 01/04/2024           Assessment    Kamron Headley is a 33 year old male seen as a PAC referral for risk assessment and " optimization for anesthesia.    Plan/Recommendations  Pt will be optimized for the proposed procedure.  See below for details on the assessment, risk, and preoperative recommendations    NEUROLOGY  - No history of TIA, CVA or seizure    -Post Op delirium risk factors:  No risk identified    ENT  - No current airway concerns.  Will need to be reassessed day of surgery.  Mallampati: I  TM: > 3    CARDIAC  - No history of CAD, Hypertension, and Afib  No anginal symptoms, Denies palpitations, PND, dizziness or syncope.     - METS (Metabolic Equivalents)  Patient performs 4 or more METS exercise without symptoms            Total Score: 0      RCRI-Very low risk: Class 1 0.4% complication rate            Total Score: 0        PULMONARY    LISBETH Low Risk            Total Score: 1    LISBETH: Male        - Tobacco History    History   Smoking Status    Never   Smokeless Tobacco    Never       GI  Patient reports his Hep C testing was a false positive.  LFT's WNL    PONV Medium Risk  Total Score: 2           1 AN PONV: Patient is not a current smoker    1 AN PONV: Intended Post Op Opioids        /RENAL  - Baseline Creatinine  WNL      ENDOCRINE    - BMI: Estimated body mass index is 23.87 kg/m  as calculated from the following:    Height as of 1/12/24: 1.829 m (6').    Weight as of 1/12/24: 79.8 kg (176 lb).  Healthy Weight (BMI 18.5-24.9)  - No history of Diabetes Mellitus    HEME/IMMUNE/ONC  VTE Low Risk 0.5%            Total Score: 2    VTE: Male      - No history of abnormal bleeding or antiplatelet use.  - HIV, continue Biktarvy  - Anal SCC  - Anal warts- Procedure scheduled as above.       Different anesthesia methods/types have been discussed with the patient, but they are aware that the final plan will be decided by the assigned anesthesia provider on the date of service.      The patient is optimized for their procedure. AVS with information on surgery time/arrival time, meds and NPO status given by nursing staff. No  further diagnostic testing indicated.      On the day of service:     Prep time: 10 minutes  Visit time: 20 minutes  Documentation time: 10 minutes  ------------------------------------------  Total time: 40 minutes      DONTE Shankar CNP  Preoperative Assessment Center  [facility]  Clinic and Surgery Center  Phone: [phone]  Fax: [phone]    Physical Exam    Airway  airway exam normal      Mallampati: II       Respiratory Devices and Support         Dental       (+) Minor Abnormalities - some fillings, tiny chips      Cardiovascular   cardiovascular exam normal          Pulmonary   pulmonary exam normal                Anesthesia Plan    ASA Status:  2       Anesthesia Type: MAC.     - Reason for MAC: straight local not clinically adequate   Induction: Intravenous.   Maintenance: TIVA.        Consents    Anesthesia Plan(s) and associated risks, benefits, and realistic alternatives discussed. Questions answered and patient/representative(s) expressed understanding.     - Discussed: Risks, Benefits and Alternatives for BOTH SEDATION and the PROCEDURE were discussed     - Discussed with:  Patient            Postoperative Care    Pain management: IV analgesics, Multi-modal analgesia, Oral pain medications.   PONV prophylaxis: Ondansetron (or other 5HT-3), Dexamethasone or Solumedrol, Background Propofol Infusion     Comments:

## 2024-01-17 NOTE — TELEPHONE ENCOUNTER
EP called 1/17 to sched a 3 month follow up with Dr. Turner via in-person per checkout notes from 1/12. Also sched labs for next avail and prior to follow up per pt's request.

## 2024-01-18 ENCOUNTER — ANESTHESIA (OUTPATIENT)
Dept: SURGERY | Facility: AMBULATORY SURGERY CENTER | Age: 34
End: 2024-01-18
Payer: COMMERCIAL

## 2024-01-18 ENCOUNTER — HOSPITAL ENCOUNTER (OUTPATIENT)
Facility: AMBULATORY SURGERY CENTER | Age: 34
Discharge: HOME | End: 2024-01-18
Attending: SURGERY
Payer: COMMERCIAL

## 2024-01-18 VITALS
OXYGEN SATURATION: 99 % | SYSTOLIC BLOOD PRESSURE: 128 MMHG | DIASTOLIC BLOOD PRESSURE: 80 MMHG | RESPIRATION RATE: 16 BRPM | WEIGHT: 175 LBS | HEIGHT: 72 IN | BODY MASS INDEX: 23.7 KG/M2 | TEMPERATURE: 97.8 F

## 2024-01-18 DIAGNOSIS — C21.0 ANAL SQUAMOUS CELL CARCINOMA (H): Primary | ICD-10-CM

## 2024-01-18 LAB
LAB DIRECTOR COMMENTS: ABNORMAL
LAB DIRECTOR DISCLAIMER: ABNORMAL
LAB DIRECTOR INTERPRETATION: ABNORMAL
LAB DIRECTOR METHODOLOGY: ABNORMAL
LAB DIRECTOR RESULTS: ABNORMAL
SPECIMEN DESCRIPTION: ABNORMAL

## 2024-01-18 PROCEDURE — 88112 CYTOPATH CELL ENHANCE TECH: CPT | Mod: 26 | Performed by: PATHOLOGY

## 2024-01-18 PROCEDURE — 88305 TISSUE EXAM BY PATHOLOGIST: CPT | Mod: 26 | Performed by: PATHOLOGY

## 2024-01-18 PROCEDURE — 46924 DESTRUCTION ANAL LESION(S): CPT

## 2024-01-18 PROCEDURE — 88305 TISSUE EXAM BY PATHOLOGIST: CPT | Mod: TC | Performed by: SURGERY

## 2024-01-18 PROCEDURE — 87624 HPV HI-RISK TYP POOLED RSLT: CPT | Performed by: SURGERY

## 2024-01-18 PROCEDURE — 88112 CYTOPATH CELL ENHANCE TECH: CPT | Mod: TC | Performed by: SURGERY

## 2024-01-18 PROCEDURE — G0452 MOLECULAR PATHOLOGY INTERPR: HCPCS | Mod: 26 | Performed by: PATHOLOGY

## 2024-01-18 RX ORDER — LABETALOL HYDROCHLORIDE 5 MG/ML
10 INJECTION, SOLUTION INTRAVENOUS
Status: DISCONTINUED | OUTPATIENT
Start: 2024-01-18 | End: 2024-01-18 | Stop reason: HOSPADM

## 2024-01-18 RX ORDER — SODIUM CHLORIDE, SODIUM LACTATE, POTASSIUM CHLORIDE, CALCIUM CHLORIDE 600; 310; 30; 20 MG/100ML; MG/100ML; MG/100ML; MG/100ML
INJECTION, SOLUTION INTRAVENOUS CONTINUOUS
Status: DISCONTINUED | OUTPATIENT
Start: 2024-01-18 | End: 2024-01-18 | Stop reason: HOSPADM

## 2024-01-18 RX ORDER — OXYCODONE HYDROCHLORIDE 5 MG/1
5 TABLET ORAL
Status: DISCONTINUED | OUTPATIENT
Start: 2024-01-18 | End: 2024-01-19 | Stop reason: HOSPADM

## 2024-01-18 RX ORDER — HYDROMORPHONE HYDROCHLORIDE 1 MG/ML
0.4 INJECTION, SOLUTION INTRAMUSCULAR; INTRAVENOUS; SUBCUTANEOUS EVERY 5 MIN PRN
Status: DISCONTINUED | OUTPATIENT
Start: 2024-01-18 | End: 2024-01-18 | Stop reason: HOSPADM

## 2024-01-18 RX ORDER — PROPOFOL 10 MG/ML
INJECTION, EMULSION INTRAVENOUS CONTINUOUS PRN
Status: DISCONTINUED | OUTPATIENT
Start: 2024-01-18 | End: 2024-01-18

## 2024-01-18 RX ORDER — BUPIVACAINE HYDROCHLORIDE AND EPINEPHRINE 2.5; 5 MG/ML; UG/ML
INJECTION, SOLUTION EPIDURAL; INFILTRATION; INTRACAUDAL; PERINEURAL PRN
Status: DISCONTINUED | OUTPATIENT
Start: 2024-01-18 | End: 2024-01-18 | Stop reason: HOSPADM

## 2024-01-18 RX ORDER — ONDANSETRON 4 MG/1
4 TABLET, ORALLY DISINTEGRATING ORAL EVERY 30 MIN PRN
Status: DISCONTINUED | OUTPATIENT
Start: 2024-01-18 | End: 2024-01-18 | Stop reason: HOSPADM

## 2024-01-18 RX ORDER — LIDOCAINE HYDROCHLORIDE 20 MG/ML
INJECTION, SOLUTION INFILTRATION; PERINEURAL PRN
Status: DISCONTINUED | OUTPATIENT
Start: 2024-01-18 | End: 2024-01-18

## 2024-01-18 RX ORDER — PROPOFOL 10 MG/ML
INJECTION, EMULSION INTRAVENOUS PRN
Status: DISCONTINUED | OUTPATIENT
Start: 2024-01-18 | End: 2024-01-18

## 2024-01-18 RX ORDER — OXYCODONE HYDROCHLORIDE 5 MG/1
5-10 TABLET ORAL EVERY 4 HOURS PRN
Qty: 20 TABLET | Refills: 0 | Status: SHIPPED | OUTPATIENT
Start: 2024-01-18 | End: 2024-04-16

## 2024-01-18 RX ORDER — GLYCOPYRROLATE 0.2 MG/ML
INJECTION, SOLUTION INTRAMUSCULAR; INTRAVENOUS PRN
Status: DISCONTINUED | OUTPATIENT
Start: 2024-01-18 | End: 2024-01-18

## 2024-01-18 RX ORDER — FENTANYL CITRATE 50 UG/ML
50 INJECTION, SOLUTION INTRAMUSCULAR; INTRAVENOUS EVERY 5 MIN PRN
Status: DISCONTINUED | OUTPATIENT
Start: 2024-01-18 | End: 2024-01-18 | Stop reason: HOSPADM

## 2024-01-18 RX ORDER — ONDANSETRON 2 MG/ML
4 INJECTION INTRAMUSCULAR; INTRAVENOUS EVERY 30 MIN PRN
Status: DISCONTINUED | OUTPATIENT
Start: 2024-01-18 | End: 2024-01-19 | Stop reason: HOSPADM

## 2024-01-18 RX ORDER — ACETAMINOPHEN 325 MG/1
650 TABLET ORAL
Status: DISCONTINUED | OUTPATIENT
Start: 2024-01-18 | End: 2024-01-19 | Stop reason: HOSPADM

## 2024-01-18 RX ORDER — HYDROMORPHONE HYDROCHLORIDE 1 MG/ML
0.2 INJECTION, SOLUTION INTRAMUSCULAR; INTRAVENOUS; SUBCUTANEOUS EVERY 5 MIN PRN
Status: DISCONTINUED | OUTPATIENT
Start: 2024-01-18 | End: 2024-01-18 | Stop reason: HOSPADM

## 2024-01-18 RX ORDER — DEXAMETHASONE SODIUM PHOSPHATE 4 MG/ML
INJECTION, SOLUTION INTRA-ARTICULAR; INTRALESIONAL; INTRAMUSCULAR; INTRAVENOUS; SOFT TISSUE PRN
Status: DISCONTINUED | OUTPATIENT
Start: 2024-01-18 | End: 2024-01-18

## 2024-01-18 RX ORDER — ONDANSETRON 4 MG/1
4 TABLET, ORALLY DISINTEGRATING ORAL EVERY 30 MIN PRN
Status: DISCONTINUED | OUTPATIENT
Start: 2024-01-18 | End: 2024-01-19 | Stop reason: HOSPADM

## 2024-01-18 RX ORDER — OXYCODONE HYDROCHLORIDE 5 MG/1
10 TABLET ORAL
Status: DISCONTINUED | OUTPATIENT
Start: 2024-01-18 | End: 2024-01-19 | Stop reason: HOSPADM

## 2024-01-18 RX ORDER — ONDANSETRON 2 MG/ML
INJECTION INTRAMUSCULAR; INTRAVENOUS PRN
Status: DISCONTINUED | OUTPATIENT
Start: 2024-01-18 | End: 2024-01-18

## 2024-01-18 RX ORDER — FENTANYL CITRATE 50 UG/ML
25 INJECTION, SOLUTION INTRAMUSCULAR; INTRAVENOUS EVERY 5 MIN PRN
Status: DISCONTINUED | OUTPATIENT
Start: 2024-01-18 | End: 2024-01-18 | Stop reason: HOSPADM

## 2024-01-18 RX ORDER — KETAMINE HYDROCHLORIDE 10 MG/ML
INJECTION INTRAMUSCULAR; INTRAVENOUS PRN
Status: DISCONTINUED | OUTPATIENT
Start: 2024-01-18 | End: 2024-01-18

## 2024-01-18 RX ORDER — ACETIC ACID 5 %
LIQUID (ML) MISCELLANEOUS PRN
Status: DISCONTINUED | OUTPATIENT
Start: 2024-01-18 | End: 2024-01-18 | Stop reason: HOSPADM

## 2024-01-18 RX ORDER — ACETAMINOPHEN 325 MG/1
975 TABLET ORAL ONCE
Status: COMPLETED | OUTPATIENT
Start: 2024-01-18 | End: 2024-01-18

## 2024-01-18 RX ORDER — FENTANYL CITRATE 50 UG/ML
INJECTION, SOLUTION INTRAMUSCULAR; INTRAVENOUS PRN
Status: DISCONTINUED | OUTPATIENT
Start: 2024-01-18 | End: 2024-01-18

## 2024-01-18 RX ORDER — ONDANSETRON 2 MG/ML
4 INJECTION INTRAMUSCULAR; INTRAVENOUS EVERY 30 MIN PRN
Status: DISCONTINUED | OUTPATIENT
Start: 2024-01-18 | End: 2024-01-18 | Stop reason: HOSPADM

## 2024-01-18 RX ORDER — ONDANSETRON 2 MG/ML
4 INJECTION INTRAMUSCULAR; INTRAVENOUS ONCE
Status: COMPLETED | OUTPATIENT
Start: 2024-01-18 | End: 2024-01-18

## 2024-01-18 RX ORDER — LIDOCAINE 40 MG/G
CREAM TOPICAL
Status: DISCONTINUED | OUTPATIENT
Start: 2024-01-18 | End: 2024-01-18 | Stop reason: HOSPADM

## 2024-01-18 RX ADMIN — PROPOFOL 30 MG: 10 INJECTION, EMULSION INTRAVENOUS at 10:03

## 2024-01-18 RX ADMIN — FENTANYL CITRATE 25 MCG: 50 INJECTION, SOLUTION INTRAMUSCULAR; INTRAVENOUS at 10:20

## 2024-01-18 RX ADMIN — PROPOFOL 30 MG: 10 INJECTION, EMULSION INTRAVENOUS at 10:27

## 2024-01-18 RX ADMIN — FENTANYL CITRATE 50 MCG: 50 INJECTION, SOLUTION INTRAMUSCULAR; INTRAVENOUS at 09:57

## 2024-01-18 RX ADMIN — PROPOFOL 40 MG: 10 INJECTION, EMULSION INTRAVENOUS at 10:10

## 2024-01-18 RX ADMIN — ONDANSETRON 4 MG: 2 INJECTION INTRAMUSCULAR; INTRAVENOUS at 09:22

## 2024-01-18 RX ADMIN — ACETAMINOPHEN 975 MG: 325 TABLET ORAL at 08:39

## 2024-01-18 RX ADMIN — KETAMINE HYDROCHLORIDE 20 MG: 10 INJECTION INTRAMUSCULAR; INTRAVENOUS at 10:06

## 2024-01-18 RX ADMIN — PROPOFOL 150 MCG/KG/MIN: 10 INJECTION, EMULSION INTRAVENOUS at 09:58

## 2024-01-18 RX ADMIN — LIDOCAINE HYDROCHLORIDE 80 MG: 20 INJECTION, SOLUTION INFILTRATION; PERINEURAL at 09:57

## 2024-01-18 RX ADMIN — KETAMINE HYDROCHLORIDE 20 MG: 10 INJECTION INTRAMUSCULAR; INTRAVENOUS at 10:33

## 2024-01-18 RX ADMIN — PROPOFOL 150 MCG/KG/MIN: 10 INJECTION, EMULSION INTRAVENOUS at 10:41

## 2024-01-18 RX ADMIN — FENTANYL CITRATE 25 MCG: 50 INJECTION, SOLUTION INTRAMUSCULAR; INTRAVENOUS at 10:27

## 2024-01-18 RX ADMIN — DEXAMETHASONE SODIUM PHOSPHATE 4 MG: 4 INJECTION, SOLUTION INTRA-ARTICULAR; INTRALESIONAL; INTRAMUSCULAR; INTRAVENOUS; SOFT TISSUE at 10:03

## 2024-01-18 RX ADMIN — SODIUM CHLORIDE, SODIUM LACTATE, POTASSIUM CHLORIDE, CALCIUM CHLORIDE: 600; 310; 30; 20 INJECTION, SOLUTION INTRAVENOUS at 08:50

## 2024-01-18 RX ADMIN — GLYCOPYRROLATE 0.2 MG: 0.2 INJECTION, SOLUTION INTRAMUSCULAR; INTRAVENOUS at 09:53

## 2024-01-18 RX ADMIN — KETAMINE HYDROCHLORIDE 10 MG: 10 INJECTION INTRAMUSCULAR; INTRAVENOUS at 10:20

## 2024-01-18 NOTE — OP NOTE
"[facility] Colorectal Surgery Operative Report  January 18, 2024      PREOPERATIVE DIAGNOSIS:  1. History of high grade anal epithelial neoplasia  2. History of invasive anal squamous cell carcinoma  3. HIV-1  4. History of anal condyloma    POSTOPERATIVE DIAGNOSIS:   1. History of high grade anal epithelial neoplasia  2. History of invasive anal squamous cell carcinoma  3. HIV-1  4. History of anal condyloma    PROCEDURE:  1. Evaluation under anesthesia.  2. High resolution anoscopy  3. Anal biopsy  4. Fulguration of high grade anal dysplasia    ANESTHESIA: MAC plus local anesthesia.    SURGEON:  Sal Bender MD, PhD     ASSISTANT(S): None.    INDICATIONS FOR PROCEDURE  Kamron Headley is a 33 year old male who presented with history of anal epithelial neoplasia and the following history    10/25/2023 - excision of massive anal condyloma and fulguration of small condyloma with Sal Bender MD, PhD; small foci of anal SCC and background of AIN3  12/12/2023 - evaluation in clinic with Sal Bender MD, PhD - small anal condyloma noted; decision to go to  OR for surveillance HRA      I thoroughly discussed the risks, benefits, and alternatives of operative treatment with the patient and he/she agreed to proceed.    General risks related to anorectal surgery were reviewed with the patient. These include, but are not limited to urinary retention, abscess, infection, bleeding, chronic pain, anal stenosis, fistula, fissure, and fecal incontinence.     OPERATIVE PROCEDURE: After obtaining informed consent, the patient was brought to the operating room and placed in the lithotomy position. Appropriate preoperative mechanical deep venous thrombosis prophylaxis was administered. MAC anesthesia was gently induced. Bilateral lower extremity pneumatic compression devices were applied and all pressure points were cushioned.     After a \"time-out\" was performed, a total of 30 ml of Bupivacaine 0.25% " without epinephrine was injected as a pudendal block.     Anal cytology was obtained with Dacron swab. Digital anal rectal exam was performed with small posterior anal canal wart - mobile and not fixed. Dilute acetic acid soak was completed for 2 minutes. Lubricant was used to insert the anoscope. Performed high resolution microscopy using the [facility] video anoscope. The dentate line was viewed in its entirety. Abnormal areas noted were as follows:    Right anterior anal canal - biopsy & fulguration  Left anterior dentate line - biopsy & fulguration  Posterior midline dentate line - biopsy & fulguration  Right posterior dentate line - biopsy & fulguration    Biopsy was obtained. Fulguration was performed.     The perianal area was inspected after acetic acid soak. The findings noted were as above.  Of note, he has healed extremely well from his October condyloma excision.     The patient tolerated the procedures well.    PLAN: Will follow up with patient with results of biopsys. If low grade dysplasia or normal, follow up in 6 months for repeat high resolution anoscopy. If high grade dysplasia 3 months as I think there may be some residual high-grade disease, although we biopsied and fulgurated the largest lesions today.     COMPLICATIONS: none, immediately.    ESTIMATED BLOOD LOSS: 2 mL.    SPECIMEN(S): as above.    OPERATIVE FINDINGS: see above    DISPOSITION: PACU.    Sal Bender MD, PhD  Division of Colon and Rectal Surgery  [facility]      CC:  [facility] Surgery billing.  Bárbara Olivo NP.

## 2024-01-18 NOTE — DISCHARGE INSTRUCTIONS
[facility]  Home Care Following Anesthesia  For 24 hours after surgery:  Get plenty of rest.  A responsible adult must stay with you for at least 24 hours after you leave the surgery center.  Do not drive or use heavy equipment.  If you have weakness or tingling, don't drive or use heavy equipment until this feeling goes away.   Do not drink alcohol.   Avoid strenuous or risky activities.  Ask for help when climbing stairs.  You may feel lightheaded.  IF so, sit for a few minutes before standing.  Have someone help you get up.   If you have nausea (feel sick to your stomach): Drink only clear liquids such as apple juice, ginger ale, broth or 7-Up.  Rest may also help.  Be sure to drink enough fluids.  Move to a regular diet as you feel able.   You may have a slight fever.  Call the doctor if your fever is over 100 F (37.7 C) (taken under the tongue) or lasts longer than 24 hours.  You may have a dry mouth, a sore throat, muscle aches or trouble sleeping. These should go away after 24 hours.  Do not make important or legal decisions.   It is recommended to avoid smoking.               Tips for taking pain medications  To get the best pain relief possible, remember these points:  Take pain medications as directed, before pain becomes severe.  Pain medication can upset your stomach: taking it with food may help.  Constipation is a common side effect of pain medication. Drink plenty of  fluids.  Eat foods high in fiber. Take a stool softener if recommended by your doctor or pharmacist.  Do not drink alcohol, drive or operate machinery while taking pain medications.  Ask about other ways to control pain, such as with heat, ice or relaxation.    Tylenol/Acetaminophen Consumption    If you feel your pain relief is insufficient, you may take Tylenol/Acetaminophen in addition to your narcotic pain medication.   Be careful not to exceed 4,000 mg of Tylenol/Acetaminophen in a 24 hour  period from all sources.  If you are taking extra strength Tylenol/acetaminophen (500 mg), the maximum dose is 8 tablets in 24 hours.  If you are taking regular strength acetaminophen (325 mg), the maximum dose is 12 tablets in 24 hours.  You took 975 mg of tylenol at 8:45 am. You can take additional tylenol after 2:45 pm.    Call a doctor for any of the following:  Signs of infection (fever, growing tenderness at the surgery site, a large amount of drainage or bleeding, severe pain, foul-smelling drainage, redness, swelling).  It has been over 8 to 10 hours since surgery and you are still not able to urinate (pass water).  Headache for over 24 hours.  Signs of Covid-19 infection (temperature over 100 degrees, shortness of breath, cough, loss of taste/smell, generalized body aches, persistent headache, chills, sore throat, nausea/vomiting/diarrhea)  Your doctor is:  Dr. Sal Bender, Colon Rectal: [phone]                    Or dial [phone] and ask for the resident on call for:  Colon Rectal  For emergency care, call the:  [facility]:  [phone] (TTY for hearing impaired: [phone])

## 2024-01-18 NOTE — ANESTHESIA POSTPROCEDURE EVALUATION
Patient: Kamron Headley    Procedure: Procedure(s):  EXAM UNDER ANESTHESIA, RECTUM, ANOSCOPY, HIGH RESOLUTION, WITH anal dysplasia BIOPSIES       Anesthesia Type:  MAC    Note:  Disposition: Outpatient   Postop Pain Control: Uneventful            Sign Out: Well controlled pain   PONV: No   Neuro/Psych: Uneventful            Sign Out: Acceptable/Baseline neuro status   Airway/Respiratory: Uneventful            Sign Out: Acceptable/Baseline resp. status   CV/Hemodynamics: Uneventful            Sign Out: Acceptable CV status; No obvious hypovolemia; No obvious fluid overload   Other NRE: NONE   DID A NON-ROUTINE EVENT OCCUR?            Last vitals:  Vitals Value Taken Time   /80 01/18/24 1130   Temp 36.6  C (97.8  F) 01/18/24 1130   Pulse     Resp 16 01/18/24 1130   SpO2 99 % 01/18/24 1130       Electronically Signed By: Kenneth Greco MD  January 18, 2024  12:32 PM

## 2024-01-18 NOTE — ANESTHESIA CARE TRANSFER NOTE
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    Anesthesia Type:   MAC     Note:    Oropharynx: oropharynx clear of all foreign objects and spontaneously breathing  Level of Consciousness: awake and drowsy  Oxygen Supplementation: face mask  Level of Supplemental Oxygen (L/min / FiO2): 6  Independent Airway: airway patency satisfactory and stable  Dentition: dentition unchanged  Vital Signs Stable: post-procedure vital signs reviewed and stable  Report to RN Given: handoff report given  Patient transferred to: Phase II  Comments: VSS and WNL, comfortable, no PONV, report to Abigail AZAR  Handoff Report: Identifed the Patient, Identified the Reponsible Provider, Reviewed the pertinent medical history, Discussed the surgical course, Reviewed Intra-OP anesthesia mangement and issues during anesthesia, Set expectations for post-procedure period and Allowed opportunity for questions and acknowledgement of understanding      Vitals:  Vitals Value Taken Time   /68 01/18/24 1056   Temp 36.4  C (97.5  F) 01/18/24 1056   Pulse     Resp 12 01/18/24 1056   SpO2 100 % 01/18/24 1056       Electronically Signed By: DONTE Camejo CRNA  January 18, 2024  11:00 AM

## 2024-01-19 DIAGNOSIS — B20 AIDS (ACQUIRED IMMUNODEFICIENCY SYNDROME), CD4 <=200 (H): ICD-10-CM

## 2024-01-19 LAB
PATH REPORT.COMMENTS IMP SPEC: ABNORMAL
PATH REPORT.COMMENTS IMP SPEC: NORMAL
PATH REPORT.COMMENTS IMP SPEC: NORMAL
PATH REPORT.COMMENTS IMP SPEC: YES
PATH REPORT.FINAL DX SPEC: ABNORMAL
PATH REPORT.FINAL DX SPEC: NORMAL
PATH REPORT.GROSS SPEC: ABNORMAL
PATH REPORT.GROSS SPEC: NORMAL
PATH REPORT.MICROSCOPIC SPEC OTHER STN: ABNORMAL
PATH REPORT.MICROSCOPIC SPEC OTHER STN: NORMAL
PATH REPORT.RELEVANT HX SPEC: ABNORMAL
PATH REPORT.RELEVANT HX SPEC: NORMAL
PHOTO IMAGE: NORMAL

## 2024-01-22 DIAGNOSIS — A63.0 ANAL WARTS: ICD-10-CM

## 2024-01-22 DIAGNOSIS — C21.0 ANAL SQUAMOUS CELL CARCINOMA (H): Primary | ICD-10-CM

## 2024-01-22 DIAGNOSIS — K62.82 ANAL DYSPLASIA: ICD-10-CM

## 2024-01-26 ENCOUNTER — LAB (OUTPATIENT)
Dept: LAB | Facility: CLINIC | Age: 34
End: 2024-01-26
Payer: COMMERCIAL

## 2024-01-26 DIAGNOSIS — Z21 HIV-1 (HUMAN IMMUNODEFICIENCY VIRUS I) (H): ICD-10-CM

## 2024-01-26 PROCEDURE — 87449 NOS EACH ORGANISM AG IA: CPT | Performed by: INTERNAL MEDICINE

## 2024-01-26 PROCEDURE — 82306 VITAMIN D 25 HYDROXY: CPT | Performed by: INTERNAL MEDICINE

## 2024-01-26 PROCEDURE — 86780 TREPONEMA PALLIDUM: CPT | Performed by: INTERNAL MEDICINE

## 2024-01-26 PROCEDURE — 86364 TISS TRNSGLTMNASE EA IG CLAS: CPT | Mod: 59 | Performed by: INTERNAL MEDICINE

## 2024-01-26 PROCEDURE — 82784 ASSAY IGA/IGD/IGG/IGM EACH: CPT | Performed by: INTERNAL MEDICINE

## 2024-01-26 PROCEDURE — 87385 HISTOPLASMA CAPSUL AG IA: CPT | Mod: 90 | Performed by: PATHOLOGY

## 2024-01-26 PROCEDURE — 86706 HEP B SURFACE ANTIBODY: CPT | Performed by: INTERNAL MEDICINE

## 2024-01-26 PROCEDURE — 99000 SPECIMEN HANDLING OFFICE-LAB: CPT | Performed by: PATHOLOGY

## 2024-01-26 PROCEDURE — 36415 COLL VENOUS BLD VENIPUNCTURE: CPT | Performed by: PATHOLOGY

## 2024-01-27 LAB
HBV SURFACE AB SERPL IA-ACNC: 15.3 M[IU]/ML
HBV SURFACE AB SERPL IA-ACNC: REACTIVE M[IU]/ML
T PALLIDUM AB SER QL: NONREACTIVE
VIT D+METAB SERPL-MCNC: 8 NG/ML (ref 20–50)

## 2024-01-27 PROCEDURE — 87449 NOS EACH ORGANISM AG IA: CPT | Performed by: INTERNAL MEDICINE

## 2024-01-27 PROCEDURE — 87385 HISTOPLASMA CAPSUL AG IA: CPT | Performed by: INTERNAL MEDICINE

## 2024-01-29 DIAGNOSIS — E55.9 VITAMIN D DEFICIENCY: Primary | ICD-10-CM

## 2024-01-29 LAB
IGA SERPL-MCNC: 351 MG/DL (ref 84–499)
TTG IGA SER-ACNC: 0.8 U/ML
TTG IGG SER-ACNC: 1 U/ML

## 2024-01-29 RX ORDER — SWAB
2 SWAB, NON-MEDICATED MISCELLANEOUS DAILY
Qty: 120 CAPSULE | Refills: 0 | Status: SHIPPED | OUTPATIENT
Start: 2024-01-29 | End: 2024-03-29

## 2024-01-29 NOTE — PROGRESS NOTES
Brief Update:  Labs from last visit notable for vitamin D level of 8 which is considered a severe deficiency. Plan to treat as follows:    8 weeks of once/weekly 50,000 international units of PO vitamin D3  Following the 8 weeks, daily 800 international units of PO vitamin D3  Recheck level at next visit (April 2024).   Follow up pending TTG Ab to assess for Celiac/malabsorption. Results will be communicated to pt.     Madie Turner MD  Pg [phone]

## 2024-01-31 LAB — SCANNED LAB RESULT: NORMAL

## 2024-02-01 ENCOUNTER — TELEPHONE (OUTPATIENT)
Facility: CLINIC | Age: 34
End: 2024-02-01
Payer: COMMERCIAL

## 2024-02-01 PROBLEM — C21.0 ANAL SQUAMOUS CELL CARCINOMA (H): Status: ACTIVE | Noted: 2023-12-12

## 2024-02-01 PROBLEM — K62.82 ANAL DYSPLASIA: Status: ACTIVE | Noted: 2024-01-22

## 2024-02-01 LAB
H CAPSUL AG SER IA-ACNC: NOT DETECTED
H CAPSUL AG SER QL IA: NOT DETECTED
SCANNED LAB RESULT: NORMAL
SCANNED LAB RESULT: NORMAL

## 2024-02-01 NOTE — TELEPHONE ENCOUNTER
Called and spoke with the patient to schedule surgery. Surgery scheduled for 4/18 at [facility]  with Dr. Bender..    H&P with PCP Dr. Turner within 30 days of the surgery date. Patient verbalized understanding H&P is needed prior to surgery. Patient is aware he will get a call from the pre-op nurses prior to surgery to confirm arrival time and surgery information.     Writer will mail surgery packet.    No further questions/concerns at this time.    Kizzy Mcqueen on 2/1/2024 at 1:43 PM

## 2024-02-16 ENCOUNTER — ALLIED HEALTH/NURSE VISIT (OUTPATIENT)
Dept: SURGERY | Facility: CLINIC | Age: 34
End: 2024-02-16
Payer: COMMERCIAL

## 2024-02-16 DIAGNOSIS — B97.7 HIGH RISK HPV INFECTION: Primary | ICD-10-CM

## 2024-02-16 PROCEDURE — 90651 9VHPV VACCINE 2/3 DOSE IM: CPT

## 2024-02-16 PROCEDURE — 90471 IMMUNIZATION ADMIN: CPT | Performed by: SURGERY

## 2024-02-16 PROCEDURE — 90471 IMMUNIZATION ADMIN: CPT

## 2024-02-16 PROCEDURE — 90651 9VHPV VACCINE 2/3 DOSE IM: CPT | Performed by: SURGERY

## 2024-02-16 NOTE — PROGRESS NOTES
Third HPV vaccine given in pt right deltoid, site was cleaned prior to injection with alcohol prep pad. Band-Aid dressed the injection site.    Pt has our number should other issues arise. All questions answered for now.   No treatment and appointment scheduled to see MD. Bárbara Olivo, NP-C  available for supervision of care if needed or if questions should arise and regarding plan of care.     Dago Duran, EMT-P

## 2024-03-22 ENCOUNTER — TELEPHONE (OUTPATIENT)
Dept: INFECTIOUS DISEASES | Facility: CLINIC | Age: 34
End: 2024-03-22
Payer: COMMERCIAL

## 2024-03-22 NOTE — TELEPHONE ENCOUNTER
Patient Contacted for the patient to call back and schedule the following:    Appointment type: Return  Provider: Johnny  Return date: 4/19/2024  Specialty phone number: [phone]  Additional appointment(s) needed: labs 1 week prior  Additonal Notes: na

## 2024-04-01 ENCOUNTER — LAB (OUTPATIENT)
Dept: LAB | Facility: CLINIC | Age: 34
End: 2024-04-01
Attending: INTERNAL MEDICINE
Payer: COMMERCIAL

## 2024-04-01 DIAGNOSIS — Z21 HIV-1 (HUMAN IMMUNODEFICIENCY VIRUS I) (H): ICD-10-CM

## 2024-04-01 DIAGNOSIS — E55.9 VITAMIN D DEFICIENCY: ICD-10-CM

## 2024-04-01 LAB
ALBUMIN SERPL BCG-MCNC: 4.8 G/DL (ref 3.5–5.2)
ALP SERPL-CCNC: 103 U/L (ref 40–150)
ALT SERPL W P-5'-P-CCNC: 90 U/L (ref 0–70)
ANION GAP SERPL CALCULATED.3IONS-SCNC: 14 MMOL/L (ref 7–15)
AST SERPL W P-5'-P-CCNC: 29 U/L (ref 0–45)
BASOPHILS # BLD AUTO: 0.1 10E3/UL (ref 0–0.2)
BASOPHILS NFR BLD AUTO: 1 %
BILIRUB SERPL-MCNC: 0.6 MG/DL
BUN SERPL-MCNC: 10.5 MG/DL (ref 6–20)
CALCIUM SERPL-MCNC: 9.7 MG/DL (ref 8.6–10)
CHLORIDE SERPL-SCNC: 99 MMOL/L (ref 98–107)
CREAT SERPL-MCNC: 0.71 MG/DL (ref 0.67–1.17)
DEPRECATED HCO3 PLAS-SCNC: 26 MMOL/L (ref 22–29)
EGFRCR SERPLBLD CKD-EPI 2021: >90 ML/MIN/1.73M2
EOSINOPHIL # BLD AUTO: 0.1 10E3/UL (ref 0–0.7)
EOSINOPHIL NFR BLD AUTO: 1 %
ERYTHROCYTE [DISTWIDTH] IN BLOOD BY AUTOMATED COUNT: 11.9 % (ref 10–15)
GLUCOSE SERPL-MCNC: 98 MG/DL (ref 70–99)
HCT VFR BLD AUTO: 43.7 % (ref 40–53)
HGB BLD-MCNC: 15.5 G/DL (ref 13.3–17.7)
IMM GRANULOCYTES # BLD: 0 10E3/UL
IMM GRANULOCYTES NFR BLD: 0 %
LYMPHOCYTES # BLD AUTO: 1 10E3/UL (ref 0.8–5.3)
LYMPHOCYTES NFR BLD AUTO: 18 %
MCH RBC QN AUTO: 31.6 PG (ref 26.5–33)
MCHC RBC AUTO-ENTMCNC: 35.5 G/DL (ref 31.5–36.5)
MCV RBC AUTO: 89 FL (ref 78–100)
MONOCYTES # BLD AUTO: 0.3 10E3/UL (ref 0–1.3)
MONOCYTES NFR BLD AUTO: 6 %
NEUTROPHILS # BLD AUTO: 3.9 10E3/UL (ref 1.6–8.3)
NEUTROPHILS NFR BLD AUTO: 74 %
NRBC # BLD AUTO: 0 10E3/UL
NRBC BLD AUTO-RTO: 0 /100
PLATELET # BLD AUTO: 294 10E3/UL (ref 150–450)
POTASSIUM SERPL-SCNC: 3.8 MMOL/L (ref 3.4–5.3)
PROT SERPL-MCNC: 8 G/DL (ref 6.4–8.3)
RBC # BLD AUTO: 4.91 10E6/UL (ref 4.4–5.9)
SODIUM SERPL-SCNC: 139 MMOL/L (ref 135–145)
WBC # BLD AUTO: 5.3 10E3/UL (ref 4–11)

## 2024-04-01 PROCEDURE — 87536 HIV-1 QUANT&REVRSE TRNSCRPJ: CPT | Performed by: INTERNAL MEDICINE

## 2024-04-01 PROCEDURE — 99000 SPECIMEN HANDLING OFFICE-LAB: CPT | Performed by: PATHOLOGY

## 2024-04-01 PROCEDURE — 80053 COMPREHEN METABOLIC PANEL: CPT | Performed by: PATHOLOGY

## 2024-04-01 PROCEDURE — 36415 COLL VENOUS BLD VENIPUNCTURE: CPT | Performed by: PATHOLOGY

## 2024-04-01 PROCEDURE — 85025 COMPLETE CBC W/AUTO DIFF WBC: CPT | Performed by: PATHOLOGY

## 2024-04-01 PROCEDURE — 82306 VITAMIN D 25 HYDROXY: CPT | Performed by: INTERNAL MEDICINE

## 2024-04-01 PROCEDURE — 86359 T CELLS TOTAL COUNT: CPT | Performed by: INTERNAL MEDICINE

## 2024-04-02 LAB
CD3 CELLS # BLD: 630 CELLS/UL (ref 603–2990)
CD3 CELLS NFR BLD: 66 % (ref 49–84)
CD3+CD4+ CELLS # BLD: 292 CELLS/UL (ref 441–2156)
CD3+CD4+ CELLS NFR BLD: 31 % (ref 28–63)
CD3+CD4+ CELLS/CD3+CD8+ CLL BLD: 0.94 % (ref 1.4–2.6)
CD3+CD8+ CELLS # BLD: 312 CELLS/UL (ref 125–1312)
CD3+CD8+ CELLS NFR BLD: 33 % (ref 10–40)
HIV1 RNA # PLAS NAA DL=20: 43 COPIES/ML
HIV1 RNA SERPL NAA+PROBE-LOG#: 1.6 {LOG_COPIES}/ML
T CELL COMMENT: ABNORMAL
VIT D+METAB SERPL-MCNC: 50 NG/ML (ref 20–50)

## 2024-04-03 NOTE — PROGRESS NOTES
[facility]  General Infectious Diseases/HIV Progress Note     Patient:  Kamron Headley, Date of birth 1990  Medical record number [id number]  Date of Visit:  04/03/2024    Assessment and Plan:   1. HIV/AIDS, VL 43 (considered undetectable); CD4 292/31%               - Continue Biktarvy.   2. 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 Bender   3. Major aphthous ulcer (recurrent)               - 4/2023 Chronic inflammation on path, cultures negative, organism staining negative. Improved with topical clobetasol.   4. Mild transaminitis - low level elevation persists, will continue to monitor  5. Penile warts - resolved  6. Vitamin D deficiency - improved    Health care maintenance:  Vaccines due: meningococcal today, can address zoster at next visit, PCV20 at next visit.   Screening due: none.     Follow up: 6 months, or PRN    I spent a total of 45 minutes on the day of the visit.   Time spent by me doing chart review, history and exam, documentation and further activities per the note      Madie Turner MD.   Pager [phone]  Infectious Diseases        Interval History:   -Last seen: 1/12/24  -Current HIV regimen: BIktarvy     -How many missed doses in 4 weeks: None  -Since the last visit:     - 1/18/24 HRA and anal biopsy, fulguration of high grade anal dysplasia. Biopsies with LSIL. Plan for 3 month HRA (4/2024), spaced out to 6 months afterwards if normal.   - Labs obtained to evaluate oral ulcers all negative. One ulcer resolved, the other remains persistent though improved.   - Vitamin D returned very low at 8. Started replacement in January with 8 weeks of 50,000 international unit(s) followed by 800 international unit(s) daily. Rechecked last week and wnl.   - Fell on the ice last week. Had elbow pain that lasted a few days, but now better. Took  1000 mg tylenol prior to labs being drawn.   - No STI screen today.   - Signed up for classes in the fall.     Review of Systems:Remaining systems all reviewed and negative.    Past Medical History:  HIV/AIDS  Major Aphthous Ulcers  HPV-associated anal SCC  Vitamin D deficiency     HIV:  -diagnosis: 2016  -previous ART regimens: none  -resistance: none 2/24/23    Health care maintenance:   A. Vaccination/Serology status.   -Hepatitis A: Immune  -Hepatitis B: 5/26/23; 6/27/23; 7/25/23, 11/2023, reactive Surface Ab 1/26/24  -Strep pneumo: PCV23 2023 - needs PCV20 in 8/2024  -TDaP: 2023  -Zoster: due   -Meningococcal: due (previously had menomune)  -Influenza   -COVID: up to date  -Mpox: 8/2022; 9/2022     B. Routine Infectious Disease screening.   -Hepatitis C: negative (2023)   -Toxo IgG: negative (2023)  -CMV IGG: negative (2023)   -TB screen: negative quant gold 5/2023  -STD sceen: GC/CT: negative; RPR negative (2023);   -HLA-B*57-01: negative  -Resistance: pan-S (2/24/23)     C. Cardiovascular, renal and bone health.   -Cholesterol: wnl (2023)  -Blood pressure: 119/76     D. Cancer screening (if applicable)  -Pap smear: +HPV condyloma, s/p resection by colorectal surgery    Medications:  Current Outpatient Medications   Medication Sig Dispense Refill    bictegravir-emtricitabine-tenofovir (BIKTARVY) -25 MG per tablet Take 1 tablet by mouth daily 30 tablet 2    FLUZONE QUADRIVALENT 0.5 ML injection       MODERNA COVID-19 BIVALENT 50 MCG/0.5ML injection       oxyCODONE (ROXICODONE) 5 MG tablet Take 1-2 tablets (5-10 mg) by mouth every 4 hours as needed for moderate to severe pain 20 tablet 0    silver sulfADIAZINE (SILVADENE) 1 % external cream Apply topically daily Apply daily (or more frequently if needed) to perianal area.  Do this for seven days and then as needed. (Patient not taking: Reported on 1/12/2024) 50 g 1       No Known Allergies    Immunizations:  Immunization History   Administered Date(s)  Administered    COVID-19 Bivalent 18+ (Moderna) 12/17/2022, 08/25/2023    COVID-19 Monovalent 18+ (Moderna) 04/15/2021, 05/13/2021, 12/01/2021    DTAP (<7y) 05/03/1995    HEPA 08/22/2008, 10/29/2012    HIB (PRP-T) 03/22/1991, 05/17/1991, 09/16/1991    HPV9 02/16/2023, 11/10/2023, 02/16/2024    HepB 09/10/1999, 11/01/1999, 05/01/2000    Hepatitis B, Adult 05/26/2023, 06/27/2023, 07/25/2023, 11/28/2023    Influenza Vaccine >6 months,quad, PF 12/17/2022    MMR 09/10/1999    Meningococcal (Menomune ) 08/22/2008    OPV, trivalent, live 05/03/1995    Pneumococcal 23 valent 08/25/2023    Smallpox/Mpox Vaccine Subcutaneous (JYNNEOS) 08/19/2022, 09/28/2022    TD,PF 7+ (Tenivac) 06/17/2002    TDAP (Adacel,Boostrix) 08/25/2023    TDAP Vaccine (Adacel) 10/29/2012       Exam:  /79   Pulse 74   Temp 98  F (36.7  C) (Oral)   Ht 1.829 m (6')   Wt 77.6 kg (171 lb)   SpO2 96%   BMI 23.19 kg/m     Wt Readings from Last 2 Encounters:   01/18/24 79.4 kg (175 lb)   01/12/24 79.8 kg (176 lb)     Physical Examination:  GENERAL:  well-appearing. no acute distress.   HEAD:  Head is normocephalic, atraumatic   EYES:  Eyes have anicteric sclerae without conjunctival injection.   ENT:  Oropharynx is moist. Superficial ulcer on L lower lip.   NECK:  Supple.   LUNGS: Normal WOB on RA.   NEUROLOGIC:  Grossly nonfocal. Active x4 extremities. Alert. Oriented.          Laboratory Data:   Metabolic Studies       Recent Labs   Lab Test 04/01/24  1715 01/04/24  1125    139   POTASSIUM 3.8 3.9   CHLORIDE 99 98   CO2 26 30*   ANIONGAP 14 11   BUN 10.5 12.6   CR 0.71 0.67   GFRESTIMATED >90 >90   GLC 98 103*   DENISHA 9.7 9.9       Hepatic Studies    Recent Labs   Lab Test 04/01/24  1715 01/04/24  1125 11/28/23  1548   BILITOTAL 0.6 0.8 0.5   ALKPHOS 103 115 102   PROTTOTAL 8.0 8.4* 7.9   ALBUMIN 4.8 4.8 4.7   AST 29 44 32   ALT 90* 94* 77*       Hematology Studies      Recent Labs   Lab Test 04/01/24  1715 01/04/24  1125 11/28/23  1548  08/25/23  0950 05/26/23  1216 02/24/23  1029   WBC 5.3 3.8* 4.6 3.7* 4.0 3.5*   HGB 15.5 16.2 15.8 16.3 15.1 15.4   HCT 43.7 45.6 45.4 45.5 43.6 44.4    253 235 223 231 206       Imaging:  No new.

## 2024-04-05 ENCOUNTER — OFFICE VISIT (OUTPATIENT)
Dept: INFECTIOUS DISEASES | Facility: CLINIC | Age: 34
End: 2024-04-05
Attending: INTERNAL MEDICINE
Payer: COMMERCIAL

## 2024-04-05 VITALS
SYSTOLIC BLOOD PRESSURE: 122 MMHG | HEIGHT: 72 IN | HEART RATE: 74 BPM | BODY MASS INDEX: 23.16 KG/M2 | OXYGEN SATURATION: 96 % | WEIGHT: 171 LBS | TEMPERATURE: 98 F | DIASTOLIC BLOOD PRESSURE: 79 MMHG

## 2024-04-05 DIAGNOSIS — B20 AIDS (ACQUIRED IMMUNODEFICIENCY SYNDROME), CD4 <=200 (H): Primary | ICD-10-CM

## 2024-04-05 PROCEDURE — 90471 IMMUNIZATION ADMIN: CPT | Performed by: INTERNAL MEDICINE

## 2024-04-05 PROCEDURE — 250N000011 HC RX IP 250 OP 636: Performed by: INTERNAL MEDICINE

## 2024-04-05 PROCEDURE — 99213 OFFICE O/P EST LOW 20 MIN: CPT | Mod: 25 | Performed by: INTERNAL MEDICINE

## 2024-04-05 PROCEDURE — 99215 OFFICE O/P EST HI 40 MIN: CPT | Performed by: INTERNAL MEDICINE

## 2024-04-05 PROCEDURE — 90619 MENACWY-TT VACCINE IM: CPT | Performed by: INTERNAL MEDICINE

## 2024-04-05 RX ADMIN — NEISSERIA MENINGITIDIS GROUP A CAPSULAR POLYSACCHARIDE TETANUS TOXOID CONJUGATE ANTIGEN, NEISSERIA MENINGITIDIS GROUP C CAPSULAR POLYSACCHARIDE TETANUS TOXOID CONJUGATE ANTIGEN, NEISSERIA MENINGITIDIS GROUP Y CAPSULAR POLYSACCHARIDE TETANUS TOXOID CONJUGATE ANTIGEN, AND NEISSERIA MENINGITIDIS GROUP W-135 CAPSULAR POLYSACCHARIDE TETANUS TOXOID CONJUGATE ANTIGEN 0.5 ML: 10; 10; 10; 10 INJECTION, SOLUTION INTRAMUSCULAR at 09:59

## 2024-04-05 ASSESSMENT — PAIN SCALES - GENERAL: PAINLEVEL: NO PAIN (0)

## 2024-04-05 NOTE — LETTER
4/5/2024       RE: Kamron Headley  [address]  [place] [address]     Dear Colleague,    Thank you for referring your patient, Kamron Headley, to the [facility] [place] at [facility]. Please see a copy of my visit note below.    [facility]  General Infectious Diseases/HIV Progress Note     Patient:  Kamron Headley, Date of birth 1990  Medical record number [id number]  Date of Visit:  04/03/2024    Assessment and Plan:   1. HIV/AIDS, VL 43 (considered undetectable); CD4 292/31%               - Continue Biktarvy.   2. 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 Bender   3. Major aphthous ulcer (recurrent)               - 4/2023 Chronic inflammation on path, cultures negative, organism staining negative. Improved with topical clobetasol.   4. Mild transaminitis - low level elevation persists, will continue to monitor  5. Penile warts - resolved  6. Vitamin D deficiency - improved    Health care maintenance:  Vaccines due: meningococcal today, can address zoster at next visit, PCV20 at next visit.   Screening due: none.     Follow up: 6 months, or PRN    I spent a total of 45 minutes on the day of the visit.   Time spent by me doing chart review, history and exam, documentation and further activities per the note      Madie Turner MD.   Pager [phone]  Infectious Diseases        Interval History:   -Last seen: 1/12/24  -Current HIV regimen: BIktarvy     -How many missed doses in 4 weeks: None  -Since the last visit:     - 1/18/24 HRA and anal biopsy, fulguration of high grade anal dysplasia. Biopsies with LSIL. Plan for 3 month HRA (4/2024), spaced out to 6 months afterwards if normal.   - Labs obtained to evaluate oral ulcers all negative. One ulcer  resolved, the other remains persistent though improved.   - Vitamin D returned very low at 8. Started replacement in January with 8 weeks of 50,000 international unit(s) followed by 800 international unit(s) daily. Rechecked last week and wnl.   - Fell on the ice last week. Had elbow pain that lasted a few days, but now better. Took 1000 mg tylenol prior to labs being drawn.   - No STI screen today.   - Signed up for classes in the fall.     Review of Systems:Remaining systems all reviewed and negative.    Past Medical History:  HIV/AIDS  Major Aphthous Ulcers  HPV-associated anal SCC  Vitamin D deficiency     HIV:  -diagnosis: 2016  -previous ART regimens: none  -resistance: none 2/24/23    Health care maintenance:   A. Vaccination/Serology status.   -Hepatitis A: Immune  -Hepatitis B: 5/26/23; 6/27/23; 7/25/23, 11/2023, reactive Surface Ab 1/26/24  -Strep pneumo: PCV23 2023 - needs PCV20 in 8/2024  -TDaP: 2023  -Zoster: due   -Meningococcal: due (previously had menomune)  -Influenza   -COVID: up to date  -Mpox: 8/2022; 9/2022     B. Routine Infectious Disease screening.   -Hepatitis C: negative (2023)   -Toxo IgG: negative (2023)  -CMV IGG: negative (2023)   -TB screen: negative quant gold 5/2023  -STD sceen: GC/CT: negative; RPR negative (2023);   -HLA-B*57-01: negative  -Resistance: pan-S (2/24/23)     C. Cardiovascular, renal and bone health.   -Cholesterol: wnl (2023)  -Blood pressure: 119/76     D. Cancer screening (if applicable)  -Pap smear: +HPV condyloma, s/p resection by colorectal surgery    Medications:  Current Outpatient Medications   Medication Sig Dispense Refill    bictegravir-emtricitabine-tenofovir (BIKTARVY) -25 MG per tablet Take 1 tablet by mouth daily 30 tablet 2    FLUZONE QUADRIVALENT 0.5 ML injection       MODERNA COVID-19 BIVALENT 50 MCG/0.5ML injection       oxyCODONE (ROXICODONE) 5 MG tablet Take 1-2 tablets (5-10 mg) by mouth every 4 hours as needed for moderate to severe pain  20 tablet 0    silver sulfADIAZINE (SILVADENE) 1 % external cream Apply topically daily Apply daily (or more frequently if needed) to perianal area.  Do this for seven days and then as needed. (Patient not taking: Reported on 1/12/2024) 50 g 1       No Known Allergies    Immunizations:  Immunization History   Administered Date(s) Administered    COVID-19 Bivalent 18+ (Moderna) 12/17/2022, 08/25/2023    COVID-19 Monovalent 18+ (Moderna) 04/15/2021, 05/13/2021, 12/01/2021    DTAP (<7y) 05/03/1995    HEPA 08/22/2008, 10/29/2012    HIB (PRP-T) 03/22/1991, 05/17/1991, 09/16/1991    HPV9 02/16/2023, 11/10/2023, 02/16/2024    HepB 09/10/1999, 11/01/1999, 05/01/2000    Hepatitis B, Adult 05/26/2023, 06/27/2023, 07/25/2023, 11/28/2023    Influenza Vaccine >6 months,quad, PF 12/17/2022    MMR 09/10/1999    Meningococcal (Menomune ) 08/22/2008    OPV, trivalent, live 05/03/1995    Pneumococcal 23 valent 08/25/2023    Smallpox/Mpox Vaccine Subcutaneous (JYNNEOS) 08/19/2022, 09/28/2022    TD,PF 7+ (Tenivac) 06/17/2002    TDAP (Adacel,Boostrix) 08/25/2023    TDAP Vaccine (Adacel) 10/29/2012       Exam:  /79   Pulse 74   Temp 98  F (36.7  C) (Oral)   Ht 1.829 m (6')   Wt 77.6 kg (171 lb)   SpO2 96%   BMI 23.19 kg/m     Wt Readings from Last 2 Encounters:   01/18/24 79.4 kg (175 lb)   01/12/24 79.8 kg (176 lb)     Physical Examination:  GENERAL:  well-appearing. no acute distress.   HEAD:  Head is normocephalic, atraumatic   EYES:  Eyes have anicteric sclerae without conjunctival injection.   ENT:  Oropharynx is moist. Superficial ulcer on L lower lip.   NECK:  Supple.   LUNGS: Normal WOB on RA.   NEUROLOGIC:  Grossly nonfocal. Active x4 extremities. Alert. Oriented.          Laboratory Data:   Metabolic Studies       Recent Labs   Lab Test 04/01/24  1715 01/04/24  1125    139   POTASSIUM 3.8 3.9   CHLORIDE 99 98   CO2 26 30*   ANIONGAP 14 11   BUN 10.5 12.6   CR 0.71 0.67   GFRESTIMATED >90 >90   GLC 98 103*    DENISHA 9.7 9.9       Hepatic Studies    Recent Labs   Lab Test 04/01/24  1715 01/04/24  1125 11/28/23  1548   BILITOTAL 0.6 0.8 0.5   ALKPHOS 103 115 102   PROTTOTAL 8.0 8.4* 7.9   ALBUMIN 4.8 4.8 4.7   AST 29 44 32   ALT 90* 94* 77*       Hematology Studies      Recent Labs   Lab Test 04/01/24  1715 01/04/24  1125 11/28/23  1548 08/25/23  0950 05/26/23  1216 02/24/23  1029   WBC 5.3 3.8* 4.6 3.7* 4.0 3.5*   HGB 15.5 16.2 15.8 16.3 15.1 15.4   HCT 43.7 45.6 45.4 45.5 43.6 44.4    253 235 223 231 206       Imaging:  No new.

## 2024-04-05 NOTE — NURSING NOTE
Chief Complaint   Patient presents with    RECHECK     /79   Pulse 74   Temp 98  F (36.7  C) (Oral)   Ht 1.829 m (6')   Wt 77.6 kg (171 lb)   SpO2 96%   BMI 23.19 kg/m    Chelo Zuñiga MA on 4/5/2024 at 9:10 AM

## 2024-04-08 ENCOUNTER — PATIENT OUTREACH (OUTPATIENT)
Dept: INFECTIOUS DISEASES | Facility: CLINIC | Age: 34
End: 2024-04-08
Payer: COMMERCIAL

## 2024-04-08 NOTE — TELEPHONE ENCOUNTER
Social Work - Intervention  [facility]    Data/Intervention: 2024    Patient Name: Kamron Headley Goes By: Kamron    /Age: 1990 (33 year old)     Visit Type: Telephone  Referral Source: RN   Reason for Referral: General Check In     Collaborated With:    -Zulay Burnham RN  -Patient      Psychosocial Information/Concerns:  General Check In, had recent appointment.      Intervention/Education/Resources Provided:  Writer spoke to Patient, completed introduction and explained role. Patient stated that he has his needs met and noted that he was appreciative of the clinic to assist with his workplace accomodation so he can attend appointments. Patient denied any current concerns, noting that he is starting classes for college shortly.     Assessment/Plan:  Patient will reach out if further assistance is needed.      Provided Patient with contact information and availability.    KEEGAN Diaz, [facility]  Infectious Disease

## 2024-04-11 ENCOUNTER — ANESTHESIA EVENT (OUTPATIENT)
Dept: SURGERY | Facility: AMBULATORY SURGERY CENTER | Age: 34
End: 2024-04-11
Payer: COMMERCIAL

## 2024-04-12 ENCOUNTER — TELEPHONE (OUTPATIENT)
Dept: SURGERY | Facility: CLINIC | Age: 34
End: 2024-04-12

## 2024-04-12 DIAGNOSIS — B20 AIDS (ACQUIRED IMMUNODEFICIENCY SYNDROME), CD4 <=200 (H): ICD-10-CM

## 2024-04-12 NOTE — TELEPHONE ENCOUNTER
Received message from Pre-admission Nurse that they do not have info regarding any Pre-op H&P completed by patient, and this is a required appt in order for surgery to proceed on 4/18/2024.    Per previous notes, it appears that patient planned to schedule a Pre-op H&P w/ [facility] Infectious Disease Dr. Madie Turner. Unclear whether ID would do a Pre-op H&P, but there is no Pre-op H&P scheduled with an [facility] provider. Patient did see this provider for a 3 month follow up appt on 4/5/2024.    LVM for patient requesting that patient call back to confirm whether he completed an important required appt ahead of DOS 4/18/21024, since we do not have that info on-file (for a Pre-op H&P). Offered to help patient to schedule an appt if he did not complete one (Pre-op H&P).     Kizzy Waldron  Leanne-op Coordinator  [facility]  Direct Phone: [phone]

## 2024-04-15 NOTE — TELEPHONE ENCOUNTER
FUTURE VISIT INFORMATION      SURGERY INFORMATION:  Date: 4/18/24  [facility]  Surgeon:  Sal Bender MD   Anesthesia Type:  mac  Procedure: EXAM UNDER ANESTHESIA, RECTUM, ANOSCOPY, HIGH RESOLUTION, WITH dysplasia FULGURATION, BIOPSIES     RECORDS REQUESTED FROM:       Primary Care Provider: Tess Geller MD - [facility]

## 2024-04-16 ENCOUNTER — VIRTUAL VISIT (OUTPATIENT)
Dept: SURGERY | Facility: CLINIC | Age: 34
End: 2024-04-16
Payer: COMMERCIAL

## 2024-04-16 ENCOUNTER — PRE VISIT (OUTPATIENT)
Dept: SURGERY | Facility: CLINIC | Age: 34
End: 2024-04-16

## 2024-04-16 ENCOUNTER — TELEPHONE (OUTPATIENT)
Dept: SURGERY | Facility: CLINIC | Age: 34
End: 2024-04-16

## 2024-04-16 VITALS — HEIGHT: 72 IN | BODY MASS INDEX: 23.16 KG/M2 | WEIGHT: 171 LBS

## 2024-04-16 DIAGNOSIS — C21.0 ANAL SQUAMOUS CELL CARCINOMA (H): ICD-10-CM

## 2024-04-16 DIAGNOSIS — Z01.818 PREOP EXAMINATION: Primary | ICD-10-CM

## 2024-04-16 PROCEDURE — 99213 OFFICE O/P EST LOW 20 MIN: CPT | Mod: 95 | Performed by: PHYSICIAN ASSISTANT

## 2024-04-16 RX ORDER — ERGOCALCIFEROL (VITAMIN D2) 10 MCG
1 TABLET ORAL DAILY
COMMUNITY

## 2024-04-16 ASSESSMENT — LIFESTYLE VARIABLES: TOBACCO_USE: 0

## 2024-04-16 NOTE — PATIENT INSTRUCTIONS
Preparing for Your Surgery      Name:  Kamron Headley   MRN:  [id number]   :  1990   Today's Date:  2024         Arriving for surgery:  Surgery date:  24  Arrival time:  8.35AM    Restrictions due to COVID 19:    Please maintain social distance.  Masking is optional.      parking is available for anyone with mobility limitations or disabilities. (Monday- Friday 7 am- 5 pm)    Please come to:    [facility]  [address] [place] [phone]    Please check in on the 5th floor at the Ambulatory Surgery Center.      What can I eat or drink?    -  You may eat and drink normally until 8 hours prior to arrival  time. (Until 12.35AM)  -  You may have clear liquids until 2 hours prior to arrival  time. (Until 6.35AM)    Examples of clear liquids:  Water  Clear broth  Juices (apple, white grape, white cranberry  and cider) without pulp  Noncarbonated, powder based beverages  (lemonade and Ravinder-Aid)  Sodas (Sprite, 7-Up, ginger ale and seltzer)  Coffee or tea (without milk or cream)  Gatorade      Which medicines can I take?    Hold Aspirin for 7 days before surgery.   Hold Multivitamins for 7 days before surgery.  Hold Supplements for 7 days before surgery.  Hold Ibuprofen (Advil, Motrin) for 1 day before surgery--unless otherwise directed by surgeon.  Hold Naproxen (Aleve) for 4 days before surgery.    No alcohol or cannabis products for 24 hours prior to procedure.      -  DO NOT take the following medications the day of surgery:  External cream    -  PLEASE TAKE the following medications the day of surgery:   Biktarvy, Oxycodone as needed.     How do I prepare myself?  - Please take 2 showers (one the night prior to surgery and one the morning of surgery) using Scrubcare or Hibiclens soap.    Use this soap only from the neck to your toes.     Leave the soap on your skin for one minute--then rinse thoroughly.      You may use your own shampoo and conditioner. No  other hair products.   - Please remove all jewelry and body piercings.  - No lotions, deodorants or fragrance.  - No makeup or fingernail polish.   - Bring your ID and insurance card.    -If you have a Deep Brain Stimulator, a Spinal Cord Stimulator, or any implanted Neuro Device, you must bring the remote to the Surgery Center.         ALL PATIENTS ARE REQUIRED TO HAVE A RESPONSIBLE ADULT TO DRIVE AND BE IN ATTENDANCE WITH THEM FOR 24 HOURS FOLLOWING SURGERY.     Covid testing policy as of 12/06/2022  Your surgeon will notify and schedule you for a COVID test if one is needed before surgery--please direct any questions or COVID symptoms to your surgeon      Questions or Concerns:    -For questions regarding the day of surgery, please contact the Ambulatory Surgery Center at [phone].    -If you have health changes between today and your surgery, please contact your surgeon.     - For questions after surgery, please contact your surgeon's office.

## 2024-04-16 NOTE — H&P
"  Pre-Operative H & P     CC:  Preoperative exam to assess for increased cardiopulmonary risk while undergoing surgery and anesthesia.    Date of Encounter: 4/16/2024  Primary Care Physician:  Madie Turner     Reason for visit:   Encounter Diagnoses   Name Primary?    Preop examination Yes    Anal squamous cell carcinoma (H)        HPI  Kamron Headley is a 33 year old male who presents for pre-operative H & P in preparation for  Procedure Information       Case: [id number] Date/Time: 04/18/24 1005    Procedure: EXAM UNDER ANESTHESIA, RECTUM, ANOSCOPY, HIGH RESOLUTION, WITH dysplasia FULGURATION, BIOPSIES (Rectum)    Anesthesia type: MAC    Diagnosis:       Anal squamous cell carcinoma (H) [C21.0]      Anal warts [A63.0]      Anal dysplasia [K62.82]    Pre-op diagnosis:       Anal squamous cell carcinoma (H) [C21.0]      Anal warts [A63.0]      Anal dysplasia [K62.82]    Location: [place] [address] / [facility]    Providers: Sal Bender MD            Mr. Headley has a PMH significant for HIV/AIDS (follows with Dr. Madie Turner in ID). He is now s/p EUA of anus with fulguration and biopsies 1/18/2024. Pathology came back as \"low-grade dysplasia\". There was no high-grade dysplasia (the precancer) and no invasive cancer in any of the specimens. He is now scheduled as above.    History is obtained from the patient and chart review    Hx of abnormal bleeding or anti-platelet use: denies      Past Medical History  Past Medical History:   Diagnosis Date    Anal warts     Human immunodeficiency virus (HIV) disease (H)        Past Surgical History  Past Surgical History:   Procedure Laterality Date    DENTAL SURGERY      wisdom teeth    EXAM UNDER ANESTHESIA RECTUM, ANOSCOPY, HIGH RESOLUTION, WITH CONDYLOMA FULGURATION N/A 1/18/2024    Procedure: EXAM UNDER ANESTHESIA, RECTUM, ANOSCOPY, HIGH RESOLUTION, WITH anal dysplasia BIOPSIES;  Surgeon: Yulia" Sal Summers MD;  Location: [facility]    EXAM UNDER ANESTHESIA, FULGURATE CONDYLOMA ANUS, COMBINED  05/30/2013    Procedure: COMBINED EXAM UNDER ANESTHESIA, FULGURATE CONDYLOMA ANUS;  Exam Under Anesthesia of Anus, Excision and Fulguration of Anal Condyloma;  Surgeon: Matias Lopez MD;  Location: [facility] OR    FULGURATE CONDYLOMA RECTUM N/A 10/25/2023    Procedure: exam under anesthesia, excision of anal condyloma;  Surgeon: Sal Bender MD;  Location: [facility] OR       Prior to Admission Medications  Current Outpatient Medications   Medication Sig Dispense Refill    bictegravir-emtricitabine-tenofovir (BIKTARVY) -25 MG per tablet Take 1 tablet by mouth daily (Patient taking differently: Take 1 tablet by mouth every morning) 30 tablet 2    Vitamin D, Cholecalciferol, 10 MCG (400 UNIT) TABS Take 1 tablet by mouth daily         Allergies  No Known Allergies    Social History  Social History     Socioeconomic History    Marital status: Single     Spouse name: Not on file    Number of children: Not on file    Years of education: Not on file    Highest education level: Not on file   Occupational History    Not on file   Tobacco Use    Smoking status: Never     Passive exposure: Never    Smokeless tobacco: Never   Vaping Use    Vaping status: Never Used   Substance and Sexual Activity    Alcohol use: No    Drug use: No    Sexual activity: Yes     Partners: Male     Birth control/protection: Condom   Other Topics Concern    Parent/sibling w/ CABG, MI or angioplasty before 65F 55M? No   Social History Narrative    Not on file     Social Determinants of Health     Financial Resource Strain: Not on file   Food Insecurity: Not on file   Transportation Needs: Not on file   Physical Activity: Not on file   Stress: Not on file   Social Connections: Not on file   Interpersonal Safety: Not on file   Housing Stability: Not on file       Family History  Family History   Problem Relation Age of Onset    Cancer  Paternal Grandmother     Family History Negative No family hx of         no skin disorders    Anesthesia Reaction No family hx of     Deep Vein Thrombosis (DVT) No family hx of        Review of Systems  The complete review of systems is negative other than noted in the HPI or here.     Anesthesia Evaluation   Pt has had prior anesthetic.     No history of anesthetic complications       ROS/MED HX  ENT/Pulmonary:    (-) tobacco use, asthma, sleep apnea and recent URI   Neurologic:  - neg neurologic ROS     Cardiovascular:  - neg cardiovascular ROS     METS/Exercise Tolerance: >4 METS    Hematologic:  - neg hematologic  ROS  (-) history of blood clots and history of blood transfusion   Musculoskeletal:  - neg musculoskeletal ROS     GI/Hepatic: Comment: Anal warts  Hx of false + Hep C test.      (-) GERD and liver diseaseHepatitis: LFTs WNL.   Renal/Genitourinary:  - neg Renal ROS     Endo:  - neg endo ROS     Psychiatric/Substance Use:  - neg psychiatric ROS  (-) alcohol abuse history   Infectious Disease: Comment: Anal warts  HPV      (+)     HIV,       Malignancy: Comment: SCC anal - neg malignancy ROS (+) Malignancy, History of GI.GI CA  Active status post Surgery.      Other:  - neg other ROS          Virtual visit -  No vitals were obtained    Physical Exam  Constitutional: Awake, alert, cooperative, no apparent distress, and appears stated age.  HENT: Normocephalic  Respiratory: non labored breathing   Neurologic: Awake, alert, oriented to name, place and time.   Neuropsychiatric: Calm, cooperative. Normal affect.      Prior Labs/Diagnostic Studies   All labs and imaging personally reviewed     Component      Latest Ref Rng 4/1/2024  5:15 PM   WBC      4.0 - 11.0 10e3/uL 5.3    RBC Count      4.40 - 5.90 10e6/uL 4.91    Hemoglobin      13.3 - 17.7 g/dL 15.5    Hematocrit      40.0 - 53.0 % 43.7    MCV      78 - 100 fL 89    MCH      26.5 - 33.0 pg 31.6    MCHC      31.5 - 36.5 g/dL 35.5    RDW      10.0 - 15.0  % 11.9    Platelet Count      150 - 450 10e3/uL 294    % Neutrophils      % 74    % Lymphocytes      % 18    % Monocytes      % 6    % Eosinophils      % 1    % Basophils      % 1    % Immature Granulocytes      % 0    NRBCs per 100 WBC      <1 /100 0    Absolute Neutrophils      1.6 - 8.3 10e3/uL 3.9    Absolute Lymphocytes      0.8 - 5.3 10e3/uL 1.0    Absolute Monocytes      0.0 - 1.3 10e3/uL 0.3    Absolute Eosinophils      0.0 - 0.7 10e3/uL 0.1    Absolute Basophils      0.0 - 0.2 10e3/uL 0.1    Absolute Immature Granulocytes      <=0.4 10e3/uL 0.0    Absolute NRBCs      10e3/uL 0.0        Component      Latest Ref Rng 4/1/2024  5:15 PM   Sodium      135 - 145 mmol/L 139    Potassium      3.4 - 5.3 mmol/L 3.8    Carbon Dioxide (CO2)      22 - 29 mmol/L 26    Anion Gap      7 - 15 mmol/L 14    Urea Nitrogen      6.0 - 20.0 mg/dL 10.5    Creatinine      0.67 - 1.17 mg/dL 0.71    GFR Estimate      >60 mL/min/1.73m2 >90    Calcium      8.6 - 10.0 mg/dL 9.7    Chloride      98 - 107 mmol/L 99    Glucose      70 - 99 mg/dL 98    Alkaline Phosphatase      40 - 150 U/L 103    AST      0 - 45 U/L 29    ALT      0 - 70 U/L 90 (H)    Protein Total      6.4 - 8.3 g/dL 8.0    Albumin      3.5 - 5.2 g/dL 4.8    Bilirubin Total      <=1.2 mg/dL 0.6       Legend:  (H) High    EKG/ stress test - if available please see in ROS above         The patient's records and results personally reviewed by this provider.     Outside records reviewed from: Care Everywhere      Assessment    Kamron Headley is a 33 year old male seen as a PAC referral for risk assessment and optimization for anesthesia.    Plan/Recommendations  Pt will be optimized for the proposed procedure.  See below for details on the assessment, risk, and preoperative recommendations    NEUROLOGY  - No history of TIA, CVA or seizure    -Post Op delirium risk factors:  No risk identified    ENT  - No current airway concerns.  Will need to be reassessed day of  surgery.  Mallampati: Unable to assess  TM: Unable to assess    CARDIAC  - denies cardiac history  - METS (Metabolic Equivalents)  Patient performs 4 or more METS exercise without symptoms             Total Score: 0      RCRI-Very low risk: Class 1 0.4% complication rate             Total Score: 0        PULMONARY    LISBETH Low Risk             Total Score: 1    LISBETH: Male      - Denies asthma or inhaler use  - Tobacco History    History   Smoking Status    Never   Smokeless Tobacco    Never       GI  - denies GERD  PONV Medium Risk  Total Score: 2           1 AN PONV: Patient is not a current smoker    1 AN PONV: Intended Post Op Opioids        /RENAL  - Baseline Creatinine  0.71    ENDOCRINE    - BMI: Estimated body mass index is 23.19 kg/m  as calculated from the following:    Height as of this encounter: 1.829 m (6').    Weight as of this encounter: 77.6 kg (171 lb).  Healthy Weight (BMI 18.5-24.9)  - No history of Diabetes Mellitus    HEME  VTE Low Risk 0.5%             Total Score: 2    VTE: Male      - No history of abnormal bleeding or antiplatelet use.      ID  - HIV, continue Biktarvy     Different anesthesia methods/types have been discussed with the patient, but they are aware that the final plan will be decided by the assigned anesthesia provider on the date of service.      The patient is optimized for their procedure. AVS with information on surgery time/arrival time, meds and NPO status given by nursing staff. No further diagnostic testing indicated.    Please refer to the physical examination documented by the anesthesiologist in the anesthesia record on the day of surgery.    Video-Visit Details    Type of service:  Video Visit    Provider received verbal consent for a Video Visit from the patient? Yes     Originating Location (pt. Location): Home    Distant Location (provider location):  Off-site  Mode of Communication:  Video Conference via [employer]  On the day of service:     Prep time: 5  minutes  Visit time: 5 minutes  Documentation time: 5 minutes  ------------------------------------------  Total time: 15 minutes      Phoebe Toro PA-C  Preoperative Assessment Center  [facility]  Clinic and Surgery Center  Phone: [phone]  Fax: [phone]

## 2024-04-16 NOTE — PROGRESS NOTES
Kamron is a 33 year old who is being evaluated via a billable video visit.    Deb Dahl   Kamron is a 33 year old, presenting for the following health issues:  Pre-Op Exam (/)          ILEANA Lopez LPN

## 2024-04-16 NOTE — TELEPHONE ENCOUNTER
[facility] Call Center    Phone Message    May a detailed message be left on voicemail: yes     Reason for Call: Other: Patient wonders if his 4/5 visit  can be used as a pre-op for surgery next week.  He is scheduled for a pac eval today at 9am and doesn't want to attend it if not needed.  Please call asap this morning.     Action Taken: Message routed to:  [facility] ([facility]): CRS    Travel Screening: Not Applicable

## 2024-04-18 ENCOUNTER — ANESTHESIA (OUTPATIENT)
Dept: SURGERY | Facility: AMBULATORY SURGERY CENTER | Age: 34
End: 2024-04-18
Payer: COMMERCIAL

## 2024-04-18 ENCOUNTER — HOSPITAL ENCOUNTER (OUTPATIENT)
Facility: AMBULATORY SURGERY CENTER | Age: 34
Discharge: HOME | End: 2024-04-18
Attending: SURGERY
Payer: COMMERCIAL

## 2024-04-18 VITALS
OXYGEN SATURATION: 100 % | SYSTOLIC BLOOD PRESSURE: 116 MMHG | BODY MASS INDEX: 23.03 KG/M2 | RESPIRATION RATE: 14 BRPM | WEIGHT: 170 LBS | HEART RATE: 80 BPM | TEMPERATURE: 97 F | HEIGHT: 72 IN | DIASTOLIC BLOOD PRESSURE: 74 MMHG

## 2024-04-18 DIAGNOSIS — K62.82 ANAL DYSPLASIA: Primary | ICD-10-CM

## 2024-04-18 PROCEDURE — 88305 TISSUE EXAM BY PATHOLOGIST: CPT | Mod: TC | Performed by: SURGERY

## 2024-04-18 PROCEDURE — G0452 MOLECULAR PATHOLOGY INTERPR: HCPCS | Mod: 26 | Performed by: PATHOLOGY

## 2024-04-18 PROCEDURE — 87624 HPV HI-RISK TYP POOLED RSLT: CPT | Performed by: SURGERY

## 2024-04-18 PROCEDURE — 46924 DESTRUCTION ANAL LESION(S): CPT

## 2024-04-18 PROCEDURE — 88305 TISSUE EXAM BY PATHOLOGIST: CPT | Mod: 26 | Performed by: PATHOLOGY

## 2024-04-18 PROCEDURE — 46924 DESTRUCTION ANAL LESION(S): CPT | Performed by: ANESTHESIOLOGY

## 2024-04-18 PROCEDURE — 46924 DESTRUCTION ANAL LESION(S): CPT | Performed by: NURSE ANESTHETIST, CERTIFIED REGISTERED

## 2024-04-18 RX ORDER — ONDANSETRON 4 MG/1
4 TABLET, ORALLY DISINTEGRATING ORAL
Status: DISCONTINUED | OUTPATIENT
Start: 2024-04-18 | End: 2024-04-19 | Stop reason: HOSPADM

## 2024-04-18 RX ORDER — DEXMEDETOMIDINE HYDROCHLORIDE 4 UG/ML
INJECTION, SOLUTION INTRAVENOUS PRN
Status: DISCONTINUED | OUTPATIENT
Start: 2024-04-18 | End: 2024-04-18

## 2024-04-18 RX ORDER — PROPOFOL 10 MG/ML
INJECTION, EMULSION INTRAVENOUS CONTINUOUS PRN
Status: DISCONTINUED | OUTPATIENT
Start: 2024-04-18 | End: 2024-04-18

## 2024-04-18 RX ORDER — LIDOCAINE HYDROCHLORIDE 20 MG/ML
INJECTION, SOLUTION INFILTRATION; PERINEURAL PRN
Status: DISCONTINUED | OUTPATIENT
Start: 2024-04-18 | End: 2024-04-18

## 2024-04-18 RX ORDER — ONDANSETRON 4 MG/1
4 TABLET, ORALLY DISINTEGRATING ORAL EVERY 30 MIN PRN
Status: DISCONTINUED | OUTPATIENT
Start: 2024-04-18 | End: 2024-04-18 | Stop reason: HOSPADM

## 2024-04-18 RX ORDER — OXYCODONE HYDROCHLORIDE 5 MG/1
5-10 TABLET ORAL EVERY 4 HOURS PRN
Qty: 12 TABLET | Refills: 0 | Status: SHIPPED | OUTPATIENT
Start: 2024-04-18

## 2024-04-18 RX ORDER — ONDANSETRON 2 MG/ML
4 INJECTION INTRAMUSCULAR; INTRAVENOUS ONCE
Status: DISCONTINUED | OUTPATIENT
Start: 2024-04-18 | End: 2024-04-18 | Stop reason: HOSPADM

## 2024-04-18 RX ORDER — IODINE AND POTASSIUM IODIDE .05; .1 G/ML; G/ML
SOLUTION TOPICAL PRN
Status: DISCONTINUED | OUTPATIENT
Start: 2024-04-18 | End: 2024-04-18 | Stop reason: HOSPADM

## 2024-04-18 RX ORDER — ACETAMINOPHEN 325 MG/1
650 TABLET ORAL
Status: DISCONTINUED | OUTPATIENT
Start: 2024-04-18 | End: 2024-04-19 | Stop reason: HOSPADM

## 2024-04-18 RX ORDER — FENTANYL CITRATE 50 UG/ML
50 INJECTION, SOLUTION INTRAMUSCULAR; INTRAVENOUS EVERY 5 MIN PRN
Status: DISCONTINUED | OUTPATIENT
Start: 2024-04-18 | End: 2024-04-18 | Stop reason: HOSPADM

## 2024-04-18 RX ORDER — HYDROMORPHONE HYDROCHLORIDE 1 MG/ML
0.2 INJECTION, SOLUTION INTRAMUSCULAR; INTRAVENOUS; SUBCUTANEOUS EVERY 5 MIN PRN
Status: DISCONTINUED | OUTPATIENT
Start: 2024-04-18 | End: 2024-04-18 | Stop reason: HOSPADM

## 2024-04-18 RX ORDER — PROPOFOL 10 MG/ML
INJECTION, EMULSION INTRAVENOUS PRN
Status: DISCONTINUED | OUTPATIENT
Start: 2024-04-18 | End: 2024-04-18

## 2024-04-18 RX ORDER — NALOXONE HYDROCHLORIDE 0.4 MG/ML
0.1 INJECTION, SOLUTION INTRAMUSCULAR; INTRAVENOUS; SUBCUTANEOUS
Status: DISCONTINUED | OUTPATIENT
Start: 2024-04-18 | End: 2024-04-18 | Stop reason: HOSPADM

## 2024-04-18 RX ORDER — ONDANSETRON 2 MG/ML
INJECTION INTRAMUSCULAR; INTRAVENOUS PRN
Status: DISCONTINUED | OUTPATIENT
Start: 2024-04-18 | End: 2024-04-18

## 2024-04-18 RX ORDER — SODIUM CHLORIDE, SODIUM LACTATE, POTASSIUM CHLORIDE, CALCIUM CHLORIDE 600; 310; 30; 20 MG/100ML; MG/100ML; MG/100ML; MG/100ML
INJECTION, SOLUTION INTRAVENOUS CONTINUOUS
Status: DISCONTINUED | OUTPATIENT
Start: 2024-04-18 | End: 2024-04-18 | Stop reason: HOSPADM

## 2024-04-18 RX ORDER — ONDANSETRON 4 MG/1
4 TABLET, ORALLY DISINTEGRATING ORAL EVERY 30 MIN PRN
Status: DISCONTINUED | OUTPATIENT
Start: 2024-04-18 | End: 2024-04-19 | Stop reason: HOSPADM

## 2024-04-18 RX ORDER — ONDANSETRON 2 MG/ML
4 INJECTION INTRAMUSCULAR; INTRAVENOUS EVERY 30 MIN PRN
Status: DISCONTINUED | OUTPATIENT
Start: 2024-04-18 | End: 2024-04-18 | Stop reason: HOSPADM

## 2024-04-18 RX ORDER — OXYCODONE HYDROCHLORIDE 5 MG/1
5 TABLET ORAL
Status: DISCONTINUED | OUTPATIENT
Start: 2024-04-18 | End: 2024-04-19 | Stop reason: HOSPADM

## 2024-04-18 RX ORDER — KETAMINE HYDROCHLORIDE 10 MG/ML
INJECTION INTRAMUSCULAR; INTRAVENOUS PRN
Status: DISCONTINUED | OUTPATIENT
Start: 2024-04-18 | End: 2024-04-18

## 2024-04-18 RX ORDER — ACETAMINOPHEN 325 MG/1
975 TABLET ORAL ONCE
Status: COMPLETED | OUTPATIENT
Start: 2024-04-18 | End: 2024-04-18

## 2024-04-18 RX ORDER — ACETAMINOPHEN 325 MG/1
975 TABLET ORAL ONCE
Status: DISCONTINUED | OUTPATIENT
Start: 2024-04-18 | End: 2024-04-18 | Stop reason: HOSPADM

## 2024-04-18 RX ORDER — ACETIC ACID 5 %
LIQUID (ML) MISCELLANEOUS PRN
Status: DISCONTINUED | OUTPATIENT
Start: 2024-04-18 | End: 2024-04-18 | Stop reason: HOSPADM

## 2024-04-18 RX ORDER — BUPIVACAINE HYDROCHLORIDE AND EPINEPHRINE 2.5; 5 MG/ML; UG/ML
INJECTION, SOLUTION EPIDURAL; INFILTRATION; INTRACAUDAL; PERINEURAL PRN
Status: DISCONTINUED | OUTPATIENT
Start: 2024-04-18 | End: 2024-04-18 | Stop reason: HOSPADM

## 2024-04-18 RX ORDER — HYDROMORPHONE HYDROCHLORIDE 1 MG/ML
0.4 INJECTION, SOLUTION INTRAMUSCULAR; INTRAVENOUS; SUBCUTANEOUS EVERY 5 MIN PRN
Status: DISCONTINUED | OUTPATIENT
Start: 2024-04-18 | End: 2024-04-18 | Stop reason: HOSPADM

## 2024-04-18 RX ORDER — FENTANYL CITRATE 50 UG/ML
25 INJECTION, SOLUTION INTRAMUSCULAR; INTRAVENOUS EVERY 5 MIN PRN
Status: DISCONTINUED | OUTPATIENT
Start: 2024-04-18 | End: 2024-04-18 | Stop reason: HOSPADM

## 2024-04-18 RX ORDER — LIDOCAINE 40 MG/G
CREAM TOPICAL
Status: DISCONTINUED | OUTPATIENT
Start: 2024-04-18 | End: 2024-04-18 | Stop reason: HOSPADM

## 2024-04-18 RX ORDER — ONDANSETRON 2 MG/ML
4 INJECTION INTRAMUSCULAR; INTRAVENOUS EVERY 30 MIN PRN
Status: DISCONTINUED | OUTPATIENT
Start: 2024-04-18 | End: 2024-04-19 | Stop reason: HOSPADM

## 2024-04-18 RX ORDER — OXYCODONE HYDROCHLORIDE 5 MG/1
10 TABLET ORAL
Status: DISCONTINUED | OUTPATIENT
Start: 2024-04-18 | End: 2024-04-19 | Stop reason: HOSPADM

## 2024-04-18 RX ORDER — NALOXONE HYDROCHLORIDE 0.4 MG/ML
0.1 INJECTION, SOLUTION INTRAMUSCULAR; INTRAVENOUS; SUBCUTANEOUS
Status: DISCONTINUED | OUTPATIENT
Start: 2024-04-18 | End: 2024-04-19 | Stop reason: HOSPADM

## 2024-04-18 RX ADMIN — DEXMEDETOMIDINE HYDROCHLORIDE 8 MCG: 4 INJECTION, SOLUTION INTRAVENOUS at 11:23

## 2024-04-18 RX ADMIN — Medication 0.5 MG: at 10:26

## 2024-04-18 RX ADMIN — DEXMEDETOMIDINE HYDROCHLORIDE 4 MCG: 4 INJECTION, SOLUTION INTRAVENOUS at 11:06

## 2024-04-18 RX ADMIN — PROPOFOL 150 MCG/KG/MIN: 10 INJECTION, EMULSION INTRAVENOUS at 11:14

## 2024-04-18 RX ADMIN — KETAMINE HYDROCHLORIDE 10 MG: 10 INJECTION INTRAMUSCULAR; INTRAVENOUS at 11:03

## 2024-04-18 RX ADMIN — ONDANSETRON 4 MG: 2 INJECTION INTRAMUSCULAR; INTRAVENOUS at 10:34

## 2024-04-18 RX ADMIN — KETAMINE HYDROCHLORIDE 10 MG: 10 INJECTION INTRAMUSCULAR; INTRAVENOUS at 10:32

## 2024-04-18 RX ADMIN — SODIUM CHLORIDE, SODIUM LACTATE, POTASSIUM CHLORIDE, CALCIUM CHLORIDE: 600; 310; 30; 20 INJECTION, SOLUTION INTRAVENOUS at 09:25

## 2024-04-18 RX ADMIN — KETAMINE HYDROCHLORIDE 10 MG: 10 INJECTION INTRAMUSCULAR; INTRAVENOUS at 11:23

## 2024-04-18 RX ADMIN — ACETAMINOPHEN 975 MG: 325 TABLET ORAL at 09:25

## 2024-04-18 RX ADMIN — DEXMEDETOMIDINE HYDROCHLORIDE 4 MCG: 4 INJECTION, SOLUTION INTRAVENOUS at 11:04

## 2024-04-18 RX ADMIN — PROPOFOL 30 MG: 10 INJECTION, EMULSION INTRAVENOUS at 10:32

## 2024-04-18 RX ADMIN — PROPOFOL 150 MCG/KG/MIN: 10 INJECTION, EMULSION INTRAVENOUS at 10:30

## 2024-04-18 RX ADMIN — LIDOCAINE HYDROCHLORIDE 50 MG: 20 INJECTION, SOLUTION INFILTRATION; PERINEURAL at 10:30

## 2024-04-18 ASSESSMENT — LIFESTYLE VARIABLES: TOBACCO_USE: 0

## 2024-04-18 NOTE — DISCHARGE INSTRUCTIONS
"[facility]  Home Care Following Anesthesia  For 24 hours after surgery:  Get plenty of rest.  A responsible adult must stay with you for at least 24 hours after you leave the surgery center.  Do not drive or use heavy equipment.  If you have weakness or tingling, don't drive or use heavy equipment until this feeling goes away.   Do not drink alcohol.   Avoid strenuous or risky activities.  Ask for help when climbing stairs.  You may feel lightheaded.  IF so, sit for a few minutes before standing.  Have someone help you get up.   If you have nausea (feel sick to your stomach): Drink only clear liquids such as apple juice, ginger ale, broth or 7-Up.  Rest may also help.  Be sure to drink enough fluids.  Move to a regular diet as you feel able.   You may have a slight fever.  Call the doctor if your fever is over 100 F (37.7 C) (taken under the tongue) or lasts longer than 24 hours.  You may have a dry mouth, a sore throat, muscle aches or trouble sleeping. These should go away after 24 hours.  Do not make important or legal decisions.   It is recommended to avoid smoking.        Today you received a Marcaine or bupivacaine block to numb the nerves near your surgery site.  This is a block using local anesthetic or \"numbing\" medication injected around the nerves to anesthetize or \"numb\" the area supplied by those nerves.  This block is injected into the muscle layer near your surgical site.  The medication may numb the location where you had surgery for 6-18 hours, but may last up to 24 hours.  If your surgical site is an arm or leg you should be careful with your affected limb, since it is possible to injure your limb without being aware of it due to the numbing.  Until full feeling returns, you should guard against bumping or hitting your limb, and avoid extreme hot or cold temperatures on the skin.  As the block wears off, the feeling will return as a tingling or prickly " sensation near your surgical site.  You will experience more discomfort from your incision as the feeling returns.  You may want to take a pain pill (a narcotic or Tylenol if this was prescribed by your surgeon) when you start to experience mild pain before the pain beccomes more severe.  If your pain medications do not control your pain you should notifiy your surgeon.    Tips for taking pain medications  To get the best pain relief possible, remember these points:  Take pain medications as directed, before pain becomes severe.  Pain medication can upset your stomach: taking it with food may help.  Constipation is a common side effect of pain medication. Drink plenty of  fluids.  Eat foods high in fiber. Take a stool softener if recommended by your doctor or pharmacist.  Do not drink alcohol, drive or operate machinery while taking pain medications.  Ask about other ways to control pain, such as with heat, ice or relaxation.    Tylenol/Acetaminophen Consumption    If you feel your pain relief is insufficient, you may take Tylenol/Acetaminophen in addition to your narcotic pain medication.   Be careful not to exceed 4,000 mg of Tylenol/Acetaminophen in a 24 hour period from all sources.  If you are taking extra strength Tylenol/acetaminophen (500 mg), the maximum dose is 8 tablets in 24 hours.  If you are taking regular strength acetaminophen (325 mg), the maximum dose is 12 tablets in 24 hours.  975 mg of Tylenol given at 9:25 am next dose may be given after 3:25 pm    Call a doctor for any of the following:  Signs of infection (fever, growing tenderness at the surgery site, a large amount of drainage or bleeding, severe pain, foul-smelling drainage, redness, swelling).  It has been over 8 to 10 hours since surgery and you are still not able to urinate (pass water).  Headache for over 24 hours.  Signs of Covid-19 infection (temperature over 100 degrees, shortness of breath, cough, loss of taste/smell, generalized  body aches, persistent headache, chills, sore throat, nausea/vomiting/diarrhea)  Your doctor is:  Dr. Sal Bender, [facility]: [phone]    Or dial [phone] and ask for the resident on call for:  [facility]  For emergency care, call the:  [facility]:  [phone] (TTY for hearing impaired: [phone])

## 2024-04-18 NOTE — ANESTHESIA PREPROCEDURE EVALUATION
"  Pre-Operative H & P     CC:  Preoperative exam to assess for increased cardiopulmonary risk while undergoing surgery and anesthesia.    Date of Encounter: 4/16/2024  Primary Care Physician:  Madie Turner     Reason for visit:   Encounter Diagnosis   Name Primary?    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 Headley has a PMH significant for HIV/AIDS (follows with Dr. Madie Turner in ID). He is now s/p EUA of anus with fulguration and biopsies 1/18/2024. Pathology came back as \"low-grade dysplasia\". There was no high-grade dysplasia (the precancer) and no invasive cancer in any of the specimens. He is now scheduled as above.    History is obtained from the patient and chart review    Hx of abnormal bleeding or anti-platelet use: denies      Past Medical History  Past Medical History:   Diagnosis Date    Anal warts     Human immunodeficiency virus (HIV) disease (H)        Past Surgical History  Past Surgical History:   Procedure Laterality Date    DENTAL SURGERY      wisdom teeth    EXAM UNDER ANESTHESIA RECTUM, ANOSCOPY, HIGH RESOLUTION, WITH CONDYLOMA FULGURATION N/A 1/18/2024    Procedure: EXAM UNDER ANESTHESIA, RECTUM, ANOSCOPY, HIGH RESOLUTION, WITH anal dysplasia BIOPSIES;  Surgeon: Sal Bender MD;  Location: [facility]    EXAM " UNDER ANESTHESIA, FULGURATE CONDYLOMA ANUS, COMBINED  05/30/2013    Procedure: COMBINED EXAM UNDER ANESTHESIA, FULGURATE CONDYLOMA ANUS;  Exam Under Anesthesia of Anus, Excision and Fulguration of Anal Condyloma;  Surgeon: Matias Lopez MD;  Location: [facility] OR    FULGURATE CONDYLOMA RECTUM N/A 10/25/2023    Procedure: exam under anesthesia, excision of anal condyloma;  Surgeon: Sal Bender MD;  Location: [facility] OR       Prior to Admission Medications  Current Outpatient Medications   Medication Sig Dispense Refill    bictegravir-emtricitabine-tenofovir (BIKTARVY) -25 MG per tablet Take 1 tablet by mouth daily (Patient taking differently: Take 1 tablet by mouth every morning) 30 tablet 2    Vitamin D, Cholecalciferol, 10 MCG (400 UNIT) TABS Take 1 tablet by mouth daily         Allergies  No Known Allergies    Social History  Social History     Socioeconomic History    Marital status: Single     Spouse name: Not on file    Number of children: Not on file    Years of education: Not on file    Highest education level: Not on file   Occupational History    Not on file   Tobacco Use    Smoking status: Never     Passive exposure: Never    Smokeless tobacco: Never   Vaping Use    Vaping status: Never Used   Substance and Sexual Activity    Alcohol use: No    Drug use: No    Sexual activity: Yes     Partners: Male     Birth control/protection: Condom   Other Topics Concern    Parent/sibling w/ CABG, MI or angioplasty before 65F 55M? No   Social History Narrative    Not on file     Social Determinants of Health     Financial Resource Strain: Not on file   Food Insecurity: Not on file   Transportation Needs: Not on file   Physical Activity: Not on file   Stress: Not on file   Social Connections: Not on file   Interpersonal Safety: Not on file   Housing Stability: Not on file       Family History  Family History   Problem Relation Age of Onset    Cancer Paternal Grandmother     Family History Negative  No family hx of         no skin disorders    Anesthesia Reaction No family hx of     Deep Vein Thrombosis (DVT) No family hx of        Review of Systems  The complete review of systems is negative other than noted in the HPI or here.     Anesthesia Evaluation   Pt has had prior anesthetic.     No history of anesthetic complications       ROS/MED HX  ENT/Pulmonary:    (-) tobacco use, asthma, sleep apnea and recent URI   Neurologic:  - neg neurologic ROS     Cardiovascular:  - neg cardiovascular ROS     METS/Exercise Tolerance: >4 METS    Hematologic:  - neg hematologic  ROS  (-) history of blood clots and history of blood transfusion   Musculoskeletal:  - neg musculoskeletal ROS     GI/Hepatic: Comment: Anal warts  Hx of false + Hep C test.      (-) GERD and liver diseaseHepatitis: LFTs WNL.   Renal/Genitourinary:  - neg Renal ROS     Endo:  - neg endo ROS     Psychiatric/Substance Use:  - neg psychiatric ROS  (-) alcohol abuse history   Infectious Disease: Comment: Anal warts  HPV      (+)     HIV,       Malignancy: Comment: SCC anal - neg malignancy ROS (+) Malignancy, History of GI.GI CA  Active status post Surgery.      Other:  - neg other ROS          Virtual visit -  No vitals were obtained    Physical Exam  Constitutional: Awake, alert, cooperative, no apparent distress, and appears stated age.  HENT: Normocephalic  Respiratory: non labored breathing   Neurologic: Awake, alert, oriented to name, place and time.   Neuropsychiatric: Calm, cooperative. Normal affect.      Prior Labs/Diagnostic Studies   All labs and imaging personally reviewed     Component      Latest Ref Rng 4/1/2024  5:15 PM   WBC      4.0 - 11.0 10e3/uL 5.3    RBC Count      4.40 - 5.90 10e6/uL 4.91    Hemoglobin      13.3 - 17.7 g/dL 15.5    Hematocrit      40.0 - 53.0 % 43.7    MCV      78 - 100 fL 89    MCH      26.5 - 33.0 pg 31.6    MCHC      31.5 - 36.5 g/dL 35.5    RDW      10.0 - 15.0 % 11.9    Platelet Count      150 - 450 10e3/uL  294    % Neutrophils      % 74    % Lymphocytes      % 18    % Monocytes      % 6    % Eosinophils      % 1    % Basophils      % 1    % Immature Granulocytes      % 0    NRBCs per 100 WBC      <1 /100 0    Absolute Neutrophils      1.6 - 8.3 10e3/uL 3.9    Absolute Lymphocytes      0.8 - 5.3 10e3/uL 1.0    Absolute Monocytes      0.0 - 1.3 10e3/uL 0.3    Absolute Eosinophils      0.0 - 0.7 10e3/uL 0.1    Absolute Basophils      0.0 - 0.2 10e3/uL 0.1    Absolute Immature Granulocytes      <=0.4 10e3/uL 0.0    Absolute NRBCs      10e3/uL 0.0        Component      Latest Ref Rng 4/1/2024  5:15 PM   Sodium      135 - 145 mmol/L 139    Potassium      3.4 - 5.3 mmol/L 3.8    Carbon Dioxide (CO2)      22 - 29 mmol/L 26    Anion Gap      7 - 15 mmol/L 14    Urea Nitrogen      6.0 - 20.0 mg/dL 10.5    Creatinine      0.67 - 1.17 mg/dL 0.71    GFR Estimate      >60 mL/min/1.73m2 >90    Calcium      8.6 - 10.0 mg/dL 9.7    Chloride      98 - 107 mmol/L 99    Glucose      70 - 99 mg/dL 98    Alkaline Phosphatase      40 - 150 U/L 103    AST      0 - 45 U/L 29    ALT      0 - 70 U/L 90 (H)    Protein Total      6.4 - 8.3 g/dL 8.0    Albumin      3.5 - 5.2 g/dL 4.8    Bilirubin Total      <=1.2 mg/dL 0.6       Legend:  (H) High    EKG/ stress test - if available please see in ROS above         The patient's records and results personally reviewed by this provider.     Outside records reviewed from: Care Everywhere      Assessment    Kamron Headley is a 33 year old male seen as a PAC referral for risk assessment and optimization for anesthesia.    Plan/Recommendations  Pt will be optimized for the proposed procedure.  See below for details on the assessment, risk, and preoperative recommendations    NEUROLOGY  - No history of TIA, CVA or seizure    -Post Op delirium risk factors:  No risk identified    ENT  - No current airway concerns.  Will need to be reassessed day of surgery.  Mallampati: Unable to assess  TM: Unable to  assess    CARDIAC  - denies cardiac history  - METS (Metabolic Equivalents)  Patient performs 4 or more METS exercise without symptoms             Total Score: 0      RCRI-Very low risk: Class 1 0.4% complication rate             Total Score: 0        PULMONARY    LISBETH Low Risk             Total Score: 1    LISBETH: Male      - Denies asthma or inhaler use  - Tobacco History    History   Smoking Status    Never   Smokeless Tobacco    Never       GI  - denies GERD  PONV Low Risk  Total Score: 1           1 AN PONV: Patient is not a current smoker        /RENAL  - Baseline Creatinine  0.71    ENDOCRINE    - BMI: Estimated body mass index is 23.19 kg/m  as calculated from the following:    Height as of 4/16/24: 1.829 m (6').    Weight as of 4/16/24: 77.6 kg (171 lb).  Healthy Weight (BMI 18.5-24.9)  - No history of Diabetes Mellitus    HEME  VTE Low Risk 0.5%             Total Score: 2    VTE: Male      - No history of abnormal bleeding or antiplatelet use.      ID  - HIV, continue Biktarvy     Different anesthesia methods/types have been discussed with the patient, but they are aware that the final plan will be decided by the assigned anesthesia provider on the date of service.      The patient is optimized for their procedure. AVS with information on surgery time/arrival time, meds and NPO status given by nursing staff. No further diagnostic testing indicated.    Please refer to the physical examination documented by the anesthesiologist in the anesthesia record on the day of surgery.    Video-Visit Details    Type of service:  Video Visit    Provider received verbal consent for a Video Visit from the patient? Yes     Originating Location (pt. Location): Home    Distant Location (provider location):  Off-site  Mode of Communication:  Video Conference via [employer]  On the day of service:     Prep time: 5 minutes  Visit time: 5 minutes  Documentation time: 5 minutes  ------------------------------------------  Total time:  15 minutes      Phoebe Toro PA-C  Preoperative Assessment Center  [facility]  Cl[facility]  Phone: [phone]  Fax: [phone]    Physical Exam    Airway  airway exam normal      Mallampati: I       Respiratory Devices and Support         Dental       (+) Minor Abnormalities - some fillings, tiny chips      Cardiovascular   cardiovascular exam normal          Pulmonary   pulmonary exam normal                Anesthesia Plan    ASA Status:  2    NPO Status:  NPO Appropriate    Anesthesia Type: MAC.     - Reason for MAC: straight local not clinically adequate   Induction: Intravenous.   Maintenance: TIVA.        Consents    Anesthesia Plan(s) and associated risks, benefits, and realistic alternatives discussed. Questions answered and patient/representative(s) expressed understanding.     - Discussed: Risks, Benefits and Alternatives for BOTH SEDATION and the PROCEDURE were discussed     - Discussed with:  Patient            Postoperative Care    Pain management: IV analgesics, Oral pain medications, Multi-modal analgesia.   PONV prophylaxis: Ondansetron (or other 5HT-3), Dexamethasone or Solumedrol, Background Propofol Infusion     Comments:

## 2024-04-18 NOTE — ANESTHESIA POSTPROCEDURE EVALUATION
Patient: Kamron Headley    Procedure: Procedure(s):  EXAM UNDER ANESTHESIA, RECTUM, ANOSCOPY, HIGH RESOLUTION, WITH dysplasia FULGURATION, BIOPSIES, FULGURATION OF ANAL CONDYLOMA       Anesthesia Type:  MAC    Note:  Disposition: Outpatient   Postop Pain Control: Uneventful            Sign Out: Well controlled pain   PONV: No   Neuro/Psych: Uneventful            Sign Out: Acceptable/Baseline neuro status   Airway/Respiratory: Uneventful            Sign Out: Acceptable/Baseline resp. status   CV/Hemodynamics: Uneventful            Sign Out: Acceptable CV status; No obvious hypovolemia; No obvious fluid overload   Other NRE: NONE   DID A NON-ROUTINE EVENT OCCUR?            Last vitals:  Vitals Value Taken Time   /74 04/18/24 1229   Temp 36.1  C (97  F) 04/18/24 1229   Pulse     Resp 14 04/18/24 1229   SpO2 100 % 04/18/24 1229       Electronically Signed By: Kenneth Greco MD  April 18, 2024  1:54 PM

## 2024-04-18 NOTE — OP NOTE
[facility] Colorectal Surgery Operative Report  April 18, 2024      PREOPERATIVE DIAGNOSIS:  1. History of high grade anal epithelial neoplasia  2. History of invasive anal squamous cell carcinoma  3. HIV-1  4. History of anal condyloma     POSTOPERATIVE DIAGNOSIS:   1. History of high grade anal epithelial neoplasia  2. History of invasive anal squamous cell carcinoma  3. HIV-1  4. History of anal condyloma     PROCEDURE:  1. Evaluation under anesthesia.  2. High resolution anoscopy  3. Anal biopsy  4. Fulguration of high grade anal dysplasia  5. Fulguration of anal condylomata     ANESTHESIA: MAC plus local anesthesia.     SURGEON:  Sal Bender MD, PhD      ASSISTANT(S): None.     INDICATIONS FOR PROCEDURE  Kamron Headley is a 33 year old male who presented with history of anal epithelial neoplasia and the following history     10/25/2023 - excision of massive anal condyloma and fulguration of small condyloma with Sal Bender MD, PhD; small foci of anal SCC and background of AIN3  12/12/2023 - evaluation in clinic with Sal Bender MD, PhD - small anal condyloma noted; decision to go to  OR for surveillance HRA  1/18/2024 - HRA with Sal Bender MD, PhD - AIN1 only        I thoroughly discussed the risks, benefits, and alternatives of operative treatment with the patient and he/she agreed to proceed.     General risks related to anorectal surgery were reviewed with the patient. These include, but are not limited to urinary retention, abscess, infection, bleeding, chronic pain, anal stenosis, fistula, fissure, and fecal incontinence.     OPERATIVE PROCEDURE: After obtaining informed consent, the patient was brought to the operating room and placed in the lithotomy position. Appropriate preoperative mechanical deep venous thrombosis prophylaxis was administered. MAC anesthesia was gently induced. Bilateral lower extremity pneumatic compression devices were applied and  "all pressure points were cushioned.     After a \"time-out\" was performed, a total of 30 ml of Bupivacaine 0.25% without epinephrine was injected as a pudendal block.     Anal cytology was obtained with Dacron swab. Digital anal rectal exam was performed with no major findings. Dilute acetic acid soak was completed for 2 minutes. Lubricant was used to insert the anoscope. Performed high resolution microscopy using the D video anoscope. The dentate line was viewed in its entirety. Abnormal areas noted were as follows:    Right lateral anal canal - biopsy & fulguration   Right lateral SCJ - biopsy & fulguration   Left Anterior SCJ - biopsy & fulguration   Left Anterior Anal Canal - biopsy & fulguration   Left Lateral Anal Verge - biopsy & fulguration   Left Anterior Anal Verge - biopsy & fulguration   Fulguration of intra-anal condylomata x 2 (together included about 1/4 of the circumference of the anal canal)    Biopsy was obtained. Fulguration was performed.     The perianal area was inspected after acetic acid soak. The findings noted were as above.     The patient tolerated the procedures well.    PLAN: Will follow up with patient with results of biopsy. If low grade dysplasia or normal, follow up in 12 months for repeat high resolution anoscopy. If high grade dysplasia in 6 months.     COMPLICATIONS: none, immediately.    ESTIMATED BLOOD LOSS: 3 mL.    SPECIMEN(S): see above.    OPERATIVE FINDINGS: see above    DISPOSITION: PACU.    Sal Bender MD, PhD  Division of Colon and Rectal Surgery  [facility]      CC:  [facility] Surgery billing.  Bárbara Olivo NP.        "

## 2024-04-18 NOTE — ANESTHESIA CARE TRANSFER NOTE
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    Anesthesia Type:   MAC     Note:    Oropharynx: spontaneously breathing  Level of Consciousness: awake  Oxygen Supplementation: room air    Independent Airway: airway patency satisfactory and stable  Dentition: dentition unchanged  Vital Signs Stable: post-procedure vital signs reviewed and stable  Report to RN Given: handoff report given  Patient transferred to: Phase II    Handoff Report: Identifed the Patient, Identified the Reponsible Provider, Reviewed the pertinent medical history, Discussed the surgical course, Reviewed Intra-OP anesthesia mangement and issues during anesthesia, Set expectations for post-procedure period and Allowed opportunity for questions and acknowledgement of understanding  Vitals:  Vitals Value Taken Time   BP     Temp     Pulse     Resp     SpO2         Electronically Signed By: DONTE Rojas CRNA  April 18, 2024  11:41 AM

## 2024-04-19 LAB
PATH REPORT.COMMENTS IMP SPEC: NORMAL
PATH REPORT.COMMENTS IMP SPEC: NORMAL
PATH REPORT.FINAL DX SPEC: NORMAL
PATH REPORT.GROSS SPEC: NORMAL
PATH REPORT.MICROSCOPIC SPEC OTHER STN: NORMAL
PATH REPORT.RELEVANT HX SPEC: NORMAL
PHOTO IMAGE: NORMAL

## 2024-06-19 ENCOUNTER — TELEPHONE (OUTPATIENT)
Dept: INFECTIOUS DISEASES | Facility: CLINIC | Age: 34
End: 2024-06-19
Payer: MEDICAID

## 2024-06-19 DIAGNOSIS — B20 AIDS (ACQUIRED IMMUNODEFICIENCY SYNDROME), CD4 <=200 (H): ICD-10-CM

## 2024-06-19 NOTE — TELEPHONE ENCOUNTER
Health Call Center    Phone Message    May a detailed message be left on voicemail: yes     Reason for Call: Medication Question or concern regarding medication   Prescription Clarification    Name of Medication:   bictegravir-emtricitabine-tenofovir (BIKTARVY) -25 MG per tablet     Prescribing Provider: Johnny     Pharmacy: [facility] - [place], [place] - [address]      What on the order needs clarification? Patient states he had just been laid off of work. Patient states he is wanting to get a 90 day prescription placed for all this medication before his insurance runs out. Patient is wanting to get a call back when this has been done. Please advise      Action Taken: Message routed to:  [facility] ([facility] ID    Travel Screening: Not Applicable     Date of Service:

## 2024-06-19 NOTE — TELEPHONE ENCOUNTER
Returned call to patient. Per pharmacy, he is not eligible for a refill until 6/29. He has been getting 30 day supplies, but pharmacy will try to run it through as a 90 day. Patient voiced understanding. Will send patient a MyChart with insurance resources. Patient had no further questions.

## 2024-07-01 NOTE — TELEPHONE ENCOUNTER
Pt was able to get 90 day supply prior to insurance ending. Should have enough Biktarvy to last at least through September now.     Chloe Grace, LyndseyD  Therapy Management Pharmacist  [facility]

## 2024-07-06 ENCOUNTER — HEALTH MAINTENANCE LETTER (OUTPATIENT)
Age: 34
End: 2024-07-06

## 2024-07-08 DIAGNOSIS — Z21 HIV-1 (HUMAN IMMUNODEFICIENCY VIRUS I) (H): ICD-10-CM

## 2024-07-08 DIAGNOSIS — C21.1 MALIGNANT NEOPLASM OF ANAL CANAL (H): Primary | ICD-10-CM

## 2024-07-08 DIAGNOSIS — C21.0 ANAL SQUAMOUS CELL CARCINOMA (H): ICD-10-CM

## 2024-07-12 ENCOUNTER — TELEPHONE (OUTPATIENT)
Facility: CLINIC | Age: 34
End: 2024-07-12
Payer: MEDICAID

## 2024-07-12 PROBLEM — C21.1 MALIGNANT NEOPLASM OF ANAL CANAL (H): Status: ACTIVE | Noted: 2024-07-08

## 2024-07-12 NOTE — TELEPHONE ENCOUNTER
Called patient to schedule surgery with Dr. Bender    Date of Surgery: 10/17    Approximate arrival time given:  NO    Location of surgery: [facility]    Pre-Op H&P: PAC 10/3, virtual    WOC: Not Applicable     Labs: Not Applicable     Post-Op Appt Date w/ NP/PA: NONE     Post-Op Appt Date w/ Surgeon: NONE     Imaging needed:  No    Bowel Prep:  Yes  2 Fleets Sent via [employer] Message    Discussed with patient PAC RN will provide arrival time and instructions for surgery at the time of the appointment: [[place] locations only]: Yes    Standard Surgery Packet Sent: Yes 07/12/24  via [employer] Message      Additional Comments:       All patients questions were answered and was instructed to review surgical packet and call back with any questions or concerns.       Inez Arellano on 7/12/2024 at 1:05 PM

## 2024-07-15 NOTE — TELEPHONE ENCOUNTER
FUTURE VISIT INFORMATION        SURGERY INFORMATION:  Date: 10/17/24  [facility]  Surgeon:  Sal Bender MD   Anesthesia Type:  mac  Procedure: EXAM UNDER ANESTHESIA, RECTUM, ANOSCOPY, HIGH RESOLUTION, WITH dysplasia FULGURATION, BIOPSIES      RECORDS REQUESTED FROM:         Primary Care Provider: Tess Geller MD - [facility]

## 2024-07-16 ENCOUNTER — VIRTUAL VISIT (OUTPATIENT)
Dept: PHARMACY | Facility: CLINIC | Age: 34
End: 2024-07-16

## 2024-07-16 DIAGNOSIS — U07.1 INFECTION DUE TO 2019 NOVEL CORONAVIRUS: Primary | ICD-10-CM

## 2024-07-16 DIAGNOSIS — Z21 HIV-1 (HUMAN IMMUNODEFICIENCY VIRUS I) (H): ICD-10-CM

## 2024-07-16 PROCEDURE — 99207 PR NO CHARGE LOS: CPT | Mod: 93 | Performed by: PHARMACIST

## 2024-07-16 NOTE — PROGRESS NOTES
Disease State Management Encounter:                          Kamron Headley is a 34 year old male called for an initial visit. He was referred to me from NISSA Biswas.    Reason for visit: COVID-19 positive.     COVID-19:   Developed COVID symptoms 7/14 (sore throat and cough). Symptoms progressed and he started to feel more ill on 7/15. Currently has a cough, fever, chills, and muscle pain. No shortness of breath.  Is interested in starting Paxlovid for symptoms. History of receiving COVID vaccines. Last booster was 1/4/24.     HIV:   Biktarvy once daily   No side effect concerns  CD4 count 292 on 4/1/24  HIV RNA 43 copies on 4/1/24    Today's Vitals: There were no vitals taken for this visit.    Assessment/Plan:    Reasonable to start Paxlovid for symptoms due to immunocompromising condition (HIV with CD4 count <300) and <5 days since symptom onset. Has cough but no SOB. Discussed risk for rebound symptoms/positive test.     Paxlovid increases concentrations of TAF which is in Biktarvy. This is not very concerning due to short Paxlovid course. Okay to take together.     Contact clinic again if not feeling better in 3-5 days or if he develops new-onset or worsening dyspnea, dizziness, and mental status changes such as confusion.    Follow-up: as needed    I spent 5 minutes with this patient today. All changes were made via verbal order from Dr. Bellamy (covering for Dr. Turner). A copy of the visit note was provided to the patient's provider(s).    A summary of these recommendations was sent via [employer].     Medication Therapy Recommendations  Infection due to 2019 novel coronavirus    Rationale: Untreated condition - Needs additional medication therapy - Indication   Recommendation: Start Medication - Paxlovid (300/100) 20 x 150 MG & 10 x 100MG Tbpk   Status: Accepted per Provider

## 2024-07-16 NOTE — PATIENT INSTRUCTIONS
Recommendations from today's disease management visit:                                                      Start Paxlovid 3 tablets 2 times daily for 5 days     Discharge Instructions for COVID-19 Patients  You were tested for COVID-19. Your result was positive. This means you do have COVID-19. Follow the steps below. If you were tested for an upcoming treatment (procedure), you should also contact your care team for next steps.  How can I take care of myself at home?  Get lots of rest.  Drink extra fluids (unless a doctor has told you not to).  Take acetaminophen (Tylenol) for fever or pain. If you have liver or kidney problems, first ask your care team if it's safe to take acetaminophen.  Adults can take either:  650 mg (two 325 mg pills) every 4 to 6 hours as needed (but no more than 10 pills per day), OR   1,000 mg (two 500 mg pills) every 6 hours as needed (but no more than 6 pills per day).  Note: Don't take more than 3,000 mg of acetaminophen (Tylenol) in one day. Acetaminophen is found in many medicines, even over-the-counter medicines. Read all labels to be sure you don't take too much.  For children:  Check the acetaminophen (Tylenol) bottle to find out the right dose based on their age or weight.  Don't give children more than 1,625 mg of Tylenol in one day.  Know when to call 911. Emergency warning signs include:  Trouble breathing or shortness of breath  Pain or pressure in the chest that doesn't go away  Feeling confused like you haven't felt before, or not being able to wake up  Bluish-colored lips or face  If you have other health problems (like cancer, heart failure, an organ transplant, or severe kidney disease): Call your specialty clinic if you don't feel better in the next 2 days.  How can I protect others?  If you DO have symptoms:  Stay home and away from others (self-isolate). You can go back to being with other people when:  You've had no fever for 24 hours--without taking any medicine that  reduces fever, AND  Your other symptoms (such as a cough) are better.  Wear a mask or face covering for 5 full days anytime you're around other people.  If you DON'T have symptoms:  Wear a mask or face covering for 5 full days anytime you're around other people.  If you plan to visit a clinic or hospital, please check their guidelines before you arrive--healthcare sites may have different rules. If you were tested because you're going to have surgery or another treatment, contact your care team for next steps.  During self-isolation  Stay home until it's safe to be around others.  At home, stay away from other people and pets. Or, wear a well-fitting mask when you need to be around others.  Monitor your symptoms. If you have any emergency warning signs listed at the link (such as trouble breathing, chest pain that won't go away, or confusion that's new to you), then get emergency medical care right away.  Stay in a separate room from other household members, if possible.  Use a separate bathroom, if possible.  Improve ventilation (air flow) at home, if possible.  Don't share personal household items, like cups, towels, and utensils.  Is there medicine to treat COVID-19?  Yes, there are safe and effective medicines. They may make you feel better faster, keep you out of the hospital, and prevent death.   It's very important to take these medicines early in your illness before you get worse.   Who should take this medicine?   These treatments are for people who are not in the hospital, but who are at risk of getting very sick from COVID. This includes people who:  Are over age 65  Are members of the BIPOC community (Black, indigenous, and people of color)  Are overweight (body mass index is over 25)  Are inactive (don't exercise)  Are pregnant  Smoke or vape (now or in the past)  Have a disability  Have any of the following health problems: diabetes, high blood pressure, cancer, heart problems, liver disease, lung  "disease, kidney disease, sickle cell disease, cystic fibrosis (CF), dementia and other neuro (brain) diseases, HIV, thalassemia, or tuberculosis (TB)  Have ever had a stroke, organ transplant, or blood cell transplant  Have a mental health problem or substance abuse disorder (drugs, alcohol)  Have a weak immune system (are immuno-compromised)  COVID medicines can affect the safety of other medicines you take. It's important to talk to your care team before you take any new medicines. Sometimes you'll need to make short-term changes to your other medicines.  What should I do if I have symptoms now and want to discuss treatments?  Call your family clinic. Or, dial [phone] ([phone]) and say \"COVID\" when prompted, OR  Go to [employer]/covid19 (click \"Message Your Care Team\"), OR  Request an appointment on [employer]  When can I go back to work?  You should NOT go back to work until you meet the guidelines on page 1. (See the \"How can I protect others?\" section.) You don't need to be re-tested for COVID before going back to work. Studies show that you won't spread the virus if it's been at least 10 days since your symptoms started (or 20 days if you have a weak immune system).  Employers, schools, and daycares: This document serves as formal notice of medical guidelines before your employee or student can return to work or school. They must meet the guidelines in the \"How can I protect others?\" section on page 1 before going back in person.   Where can I get more information?   [employer] [place] - About COVID-19:   [employer]/covid19    Preventing Spread of Respiratory Viruses when You're Sick: bit.ly/CDCVirus      For informational purposes only. Not to replace the advice of your health care provider. Clinically reviewed by Dr. Morro Carmona. Copyright   2020 [employer]. All rights reserved. [employer] 288755 - REV 04/24.      Follow-up: as needed    To schedule another MTM " appointment, please call the clinic directly or you may call the [facility] scheduling line at [phone] or toll-free at [phone].     My Clinical Pharmacist's contact information:                                                      Please feel free to contact me with any questions or concerns you have.      Gayatri Bailey, PharmD, [facility]  Medication Therapy Management Pharmacist

## 2024-09-04 DIAGNOSIS — B20 AIDS (ACQUIRED IMMUNODEFICIENCY SYNDROME), CD4 <=200 (H): ICD-10-CM

## 2024-09-06 ENCOUNTER — TELEPHONE (OUTPATIENT)
Dept: SURGERY | Facility: CLINIC | Age: 34
End: 2024-09-06
Payer: COMMERCIAL

## 2024-09-06 NOTE — TELEPHONE ENCOUNTER
Patient called to reschedule surgery with Dr. Bender on 10/17/2024 due to his school schedule.     Spoke with: Patient     Date of Surgery: 12/19/2024    Estimated Arrival time Discussed with Patient:  No    Location of surgery[place] [facility]     Pre-Op H&P: Virtual PAC 12/17/2024    Discussed with patient PAC RN will provide arrival time and instructions for surgery at the time of the appointment: [[facility] locations only]: Yes      Michael Schmidt on 9/6/2024 at 10:05 AM

## 2024-09-09 NOTE — TELEPHONE ENCOUNTER
FUTURE VISIT INFORMATION        SURGERY INFORMATION:  Date: 12/19/24  [facility]  Surgeon:  Sal Bender MD   Anesthesia Type:  mac  Procedure: EXAM UNDER ANESTHESIA, RECTUM, ANOSCOPY, HIGH RESOLUTION, WITH dysplasia FULGURATION, BIOPSIES      RECORDS REQUESTED FROM:         Primary Care Provider: Tess Geller MD - [facility]

## 2024-10-03 ENCOUNTER — PRE VISIT (OUTPATIENT)
Dept: SURGERY | Facility: CLINIC | Age: 34
End: 2024-10-03

## 2024-10-08 ENCOUNTER — TELEPHONE (OUTPATIENT)
Dept: INFECTIOUS DISEASES | Facility: CLINIC | Age: 34
End: 2024-10-08
Payer: COMMERCIAL

## 2024-10-08 NOTE — TELEPHONE ENCOUNTER
EP tried calling twice but phone kept hanging up 10/8 to let pt know to do labs prior to visit with Dr. Turner per provider.       ----- Message from Madie Turner sent at 10/8/2024  1:45 PM CDT -----  Regarding: Labs before appointment  Hello all,    Can someone call this pt to make sure he gets his labs drawn before his appointment Friday?    Thanks,  Madie

## 2024-10-08 NOTE — PROGRESS NOTES
[facility]  General Infectious Diseases/HIV Progress Note     Patient:  Kamron Headley, Date of birth 1990  Medical record number [id number]  Date of Visit:  10/11/2024    Assessment and Plan:   1. HIV/AIDS, labs pendig               - Continue Biktarvy.   2. 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 Bender   3. Major aphthous ulcer (recurrent)               - 4/2023 Chronic inflammation on path, cultures negative, organism staining negative. Improved with topical clobetasol.   4. Mild transaminitis, resolved  5. Penile warts - resolved  6. Vitamin D deficiency - improved    Health care maintenance:  Vaccines due: Zoster, PCV20, influenza, covid  Screening due: routine HIV labs, will get after visit today and follow up via Mychart w/ any concerns.       Follow up: 6 months, can make sooner nursing appointment for vaccines (influenza, PCV-20, and shingrix).     I spent a total of 32 minutes on the day of the visit.   Time spent by me doing chart review, history and exam, documentation and further activities per the note      Madie Turner MD.   Pager [phone]  Infectious Diseases        Interval History:   -Last seen: 4/5/24  -Current HIV regimen: Biktarvy      -How many missed doses in 4 weeks: No missed doses.   -Since the last visit:     - Enrolled in classes at the [employer]. Ultimate goal is PharmD. Classes are easy right now. Getting tuition paid by a clair.   - Laid off work in June due to restarting school. Grateful because work was getting toxic. On unemployment. No concerns for food or housing insecurity.   - Got Covid in July. Discussed with [facility] and took 5 days of Paxlovid. Went ok.    - Enrolled in program HH. No lapse in medications.   - No hospitalizations. No new medications.   - No sores in the mouth right now. Thinks it might  be related to drinking hot drinks (scalding gums).   - Still checking in with Dr. Bender. Every 6 months. HRA delayed by [place] due to school. Planning for December.     Review of Systems:Remaining systems all reviewed and negative.    Past Medical History:  HIV/AIDS  Major Aphthous Ulcers  HPV-associated anal SCC  Vitamin D deficiency     HIV:  -diagnosis: 2016  -previous ART regimens: none  -resistance: none 2/24/23    Health care maintenance:   A. Vaccination/Serology status.   -Hepatitis A: Immune  -Hepatitis B: 5/26/23; 6/27/23; 7/25/23, 11/2023, reactive Surface Ab 1/26/24  -Strep pneumo: PCV23 2023 - needs PCV20 in 8/2024  -TDaP: 2023  -Zoster: due   -Meningococcal: 2024  -Influenza   -COVID: up to date  -Mpox: 8/2022; 9/2022    B. Routine Infectious Disease screening.   -Hepatitis C: negative (2023)   -Toxo IgG: negative (2023)  -CMV IGG: negative (2023)   -TB screen: negative quant gold 5/2023  -STD sceen: GC/CT: negative; RPR negative (2023);   -HLA-B*57-01: negative  -Resistance: pan-S (2/24/23)    C. Cardiovascular, renal and bone health.   -Cholesterol: wnl (2023)  -Blood pressure: 117/79     D. 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 90 tablet 0    oxyCODONE (ROXICODONE) 5 MG tablet Take 1-2 tablets (5-10 mg) by mouth every 4 hours as needed for moderate to severe pain 12 tablet 0    Vitamin D, Cholecalciferol, 10 MCG (400 UNIT) TABS Take 1 tablet by mouth daily         No Known Allergies    Immunizations:  Immunization History   Administered Date(s) Administered    COVID-19 12+ (Pfizer) 01/04/2024, 10/11/2024    COVID-19 Bivalent 18+ (Moderna) 12/17/2022, 08/25/2023    COVID-19 Monovalent 18+ (Moderna) 04/15/2021, 05/13/2021, 12/01/2021    DTAP (<7y)  05/03/1995    HEPA 08/22/2008, 10/29/2012    HIB (PRP-T) 03/22/1991, 05/17/1991, 09/16/1991    HPV9 02/16/2023, 11/10/2023, 02/16/2024    HepB 09/10/1999, 11/01/1999, 05/01/2000    Hepatitis B, Adult 05/26/2023, 06/27/2023, 07/25/2023, 11/28/2023    Influenza Vaccine >6 months,quad, PF 12/17/2022, 01/04/2024    MENINGOCOCCAL ACWY (MENQUADFI ) 04/05/2024    MMR 09/10/1999    Meningococcal (Menomune ) 08/22/2008    OPV, trivalent, live 05/03/1995    Pneumococcal 23 valent 08/25/2023    Smallpox/Mpox Vaccine Subcutaneous (JYNNEOS) 08/19/2022, 09/28/2022    TD,PF 7+ (Tenivac) 06/17/2002    TDAP (Adacel,Boostrix) 08/25/2023    TDAP Vaccine (Adacel) 10/29/2012       Exam:  /79 (BP Location: Right arm)   Pulse 70   Temp 98.1  F (36.7  C) (Oral)   Ht 1.829 m (6')   Wt 79.4 kg (175 lb)   SpO2 96%   BMI 23.73 kg/m     Wt Readings from Last 2 Encounters:   10/11/24 79.4 kg (175 lb)   04/18/24 77.1 kg (170 lb)     Physical Examination:  GENERAL:  well-appearing. no acute distress.   HEAD:  Head is normocephalic, atraumatic   EYES:  Eyes have anicteric sclerae without conjunctival injection.    NECK:  Supple.  LUNGS:  Normal WOB on RA.   SKIN:  No acute rashes.    NEUROLOGIC:  Grossly nonfocal. Active x4 extremities. Alert. Oriented.          Laboratory Data:   Metabolic Studies       Recent Labs   Lab Test 10/11/24  0904 04/01/24  1715    139   POTASSIUM 4.0 3.8   CHLORIDE 100 99   CO2 31* 26   ANIONGAP 9 14   BUN 10.8 10.5   CR 0.75 0.71   GFRESTIMATED >90 >90   GLC 84 98   DENISHA 10.0 9.7       Hepatic Studies    Recent Labs   Lab Test 10/11/24  0904 04/01/24  1715 01/04/24  1125   BILITOTAL 0.7 0.6 0.8   ALKPHOS 85 103 115   PROTTOTAL 7.8 8.0 8.4*   ALBUMIN 4.8 4.8 4.8   AST 30 29 44   ALT 59 90* 94*       Hematology Studies      Recent Labs   Lab Test 10/11/24  0904 04/01/24  1715 01/04/24  1125 11/28/23  1548 08/25/23  0950 05/26/23  1216   WBC 3.3* 5.3 3.8* 4.6 3.7* 4.0   HGB 15.6 15.5 16.2 15.8 16.3 15.1    HCT 44.2 43.7 45.6 45.4 45.5 43.6    294 253 235 223 231     Imaging:  No new.

## 2024-10-11 ENCOUNTER — OFFICE VISIT (OUTPATIENT)
Dept: INFECTIOUS DISEASES | Facility: CLINIC | Age: 34
End: 2024-10-11
Attending: INTERNAL MEDICINE
Payer: COMMERCIAL

## 2024-10-11 ENCOUNTER — LAB (OUTPATIENT)
Dept: LAB | Facility: CLINIC | Age: 34
End: 2024-10-11
Attending: INTERNAL MEDICINE
Payer: COMMERCIAL

## 2024-10-11 VITALS
TEMPERATURE: 98.1 F | BODY MASS INDEX: 23.7 KG/M2 | HEART RATE: 70 BPM | DIASTOLIC BLOOD PRESSURE: 79 MMHG | WEIGHT: 175 LBS | SYSTOLIC BLOOD PRESSURE: 117 MMHG | OXYGEN SATURATION: 96 % | HEIGHT: 72 IN

## 2024-10-11 DIAGNOSIS — Z21 HIV-1 (HUMAN IMMUNODEFICIENCY VIRUS I) (H): Primary | ICD-10-CM

## 2024-10-11 DIAGNOSIS — B20 AIDS (ACQUIRED IMMUNODEFICIENCY SYNDROME), CD4 <=200 (H): ICD-10-CM

## 2024-10-11 LAB
ALBUMIN SERPL BCG-MCNC: 4.8 G/DL (ref 3.5–5.2)
ALP SERPL-CCNC: 85 U/L (ref 40–150)
ALT SERPL W P-5'-P-CCNC: 59 U/L (ref 0–70)
ANION GAP SERPL CALCULATED.3IONS-SCNC: 9 MMOL/L (ref 7–15)
AST SERPL W P-5'-P-CCNC: 30 U/L (ref 0–45)
BASOPHILS # BLD AUTO: 0 10E3/UL (ref 0–0.2)
BASOPHILS NFR BLD AUTO: 1 %
BILIRUB SERPL-MCNC: 0.7 MG/DL
BUN SERPL-MCNC: 10.8 MG/DL (ref 6–20)
CALCIUM SERPL-MCNC: 10 MG/DL (ref 8.8–10.4)
CD3 CELLS # BLD: 551 CELLS/UL (ref 603–2990)
CD3 CELLS NFR BLD: 65 % (ref 49–84)
CD3+CD4+ CELLS # BLD: 249 CELLS/UL (ref 441–2156)
CD3+CD4+ CELLS NFR BLD: 29 % (ref 28–63)
CD3+CD4+ CELLS/CD3+CD8+ CLL BLD: 0.89 % (ref 1.4–2.6)
CD3+CD8+ CELLS # BLD: 279 CELLS/UL (ref 125–1312)
CD3+CD8+ CELLS NFR BLD: 33 % (ref 10–40)
CHLORIDE SERPL-SCNC: 100 MMOL/L (ref 98–107)
CREAT SERPL-MCNC: 0.75 MG/DL (ref 0.67–1.17)
EGFRCR SERPLBLD CKD-EPI 2021: >90 ML/MIN/1.73M2
EOSINOPHIL # BLD AUTO: 0.1 10E3/UL (ref 0–0.7)
EOSINOPHIL NFR BLD AUTO: 2 %
ERYTHROCYTE [DISTWIDTH] IN BLOOD BY AUTOMATED COUNT: 12.1 % (ref 10–15)
GLUCOSE SERPL-MCNC: 84 MG/DL (ref 70–99)
HCO3 SERPL-SCNC: 31 MMOL/L (ref 22–29)
HCT VFR BLD AUTO: 44.2 % (ref 40–53)
HGB BLD-MCNC: 15.6 G/DL (ref 13.3–17.7)
IMM GRANULOCYTES # BLD: 0 10E3/UL
IMM GRANULOCYTES NFR BLD: 0 %
LYMPHOCYTES # BLD AUTO: 0.8 10E3/UL (ref 0.8–5.3)
LYMPHOCYTES NFR BLD AUTO: 25 %
MCH RBC QN AUTO: 31.8 PG (ref 26.5–33)
MCHC RBC AUTO-ENTMCNC: 35.3 G/DL (ref 31.5–36.5)
MCV RBC AUTO: 90 FL (ref 78–100)
MONOCYTES # BLD AUTO: 0.4 10E3/UL (ref 0–1.3)
MONOCYTES NFR BLD AUTO: 11 %
NEUTROPHILS # BLD AUTO: 2 10E3/UL (ref 1.6–8.3)
NEUTROPHILS NFR BLD AUTO: 62 %
NRBC # BLD AUTO: 0 10E3/UL
NRBC BLD AUTO-RTO: 0 /100
PLATELET # BLD AUTO: 231 10E3/UL (ref 150–450)
POTASSIUM SERPL-SCNC: 4 MMOL/L (ref 3.4–5.3)
PROT SERPL-MCNC: 7.8 G/DL (ref 6.4–8.3)
RBC # BLD AUTO: 4.91 10E6/UL (ref 4.4–5.9)
SODIUM SERPL-SCNC: 140 MMOL/L (ref 135–145)
T CELL COMMENT: ABNORMAL
WBC # BLD AUTO: 3.3 10E3/UL (ref 4–11)

## 2024-10-11 PROCEDURE — 87536 HIV-1 QUANT&REVRSE TRNSCRPJ: CPT | Performed by: INTERNAL MEDICINE

## 2024-10-11 PROCEDURE — 99000 SPECIMEN HANDLING OFFICE-LAB: CPT | Performed by: PATHOLOGY

## 2024-10-11 PROCEDURE — 99214 OFFICE O/P EST MOD 30 MIN: CPT | Performed by: INTERNAL MEDICINE

## 2024-10-11 PROCEDURE — G0463 HOSPITAL OUTPT CLINIC VISIT: HCPCS | Performed by: INTERNAL MEDICINE

## 2024-10-11 PROCEDURE — 80053 COMPREHEN METABOLIC PANEL: CPT | Performed by: PATHOLOGY

## 2024-10-11 PROCEDURE — 85025 COMPLETE CBC W/AUTO DIFF WBC: CPT | Performed by: PATHOLOGY

## 2024-10-11 PROCEDURE — 86359 T CELLS TOTAL COUNT: CPT | Performed by: INTERNAL MEDICINE

## 2024-10-11 PROCEDURE — 36415 COLL VENOUS BLD VENIPUNCTURE: CPT | Performed by: PATHOLOGY

## 2024-10-11 PROCEDURE — 90480 ADMN SARSCOV2 VAC 1/ONLY CMP: CPT | Performed by: INTERNAL MEDICINE

## 2024-10-11 PROCEDURE — 250N000011 HC RX IP 250 OP 636: Performed by: INTERNAL MEDICINE

## 2024-10-11 PROCEDURE — 91320 SARSCV2 VAC 30MCG TRS-SUC IM: CPT | Performed by: INTERNAL MEDICINE

## 2024-10-11 RX ADMIN — COVID-19 VACCINE, MRNA 30 MCG: 0.04 INJECTION, SUSPENSION INTRAMUSCULAR at 08:43

## 2024-10-11 ASSESSMENT — PAIN SCALES - GENERAL: PAINLEVEL: NO PAIN (0)

## 2024-10-11 NOTE — NURSING NOTE
Chief Complaint   Patient presents with    RECHECK     6 month B20     /79 (BP Location: Right arm)   Pulse 70   Temp 98.1  F (36.7  C) (Oral)   Ht 1.829 m (6')   Wt 79.4 kg (175 lb)   SpO2 96%   BMI 23.73 kg/m    Denise Pardo CMA on 10/11/2024 at 8:07 AM

## 2024-10-11 NOTE — LETTER
10/11/2024       RE: Kamron Headley  [address]  [place] [address]     Dear Colleague,    Thank you for referring your patient, Kamron Headley, to the [facility] INFECTI[facility] DISE[facility] [place] [facility]. Please see a copy of my visit note below.    [facility]  General Infectious Diseases/HIV Progress Note     Patient:  Kamron Headley, Date of birth 1990  Medical record number [id number]  Date of Visit:  10/11/2024    Assessment and Plan:   1. HIV/AIDS, labs pendig               - Continue Biktarvy.   2. 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 Bender   3. Major aphthous ulcer (recurrent)               - 4/2023 Chronic inflammation on path, cultures negative, organism staining negative. Improved with topical clobetasol.   4. Mild transaminitis, resolved  5. Penile warts - resolved  6. Vitamin D deficiency - improved    Health care maintenance:  Vaccines due: Zoster, PCV20, influenza, covid  Screening due: routine HIV labs, will get after visit today and follow up via [facility] w/ any concerns.       Follow up: 6 months, can make sooner nursing appointment for vaccines (influenza, PCV-20, and shingrix).     I spent a total of 32 minutes on the day of the visit.   Time spent by me doing chart review, history and exam, documentation and further activities per the note      Madie Turner MD.   Pager [phone]  Infectious Diseases        Interval History:   -Last seen: 4/5/24  -Current HIV regimen: Biktarvy      -How many missed doses in 4 weeks: No missed doses.   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 Getting tuition paid by a clair.   - Laid off work in June due to restarting school.  Grateful because work was getting toxic. On unemployment. No concerns for food or housing insecurity.   - Got Covid in July. Discussed with [facility] and took 5 days of Paxlovid. Went ok.    - Enrolled in program HH. No lapse in medications.   - No hospitalizations. No new medications.   - No sores in the mouth right now. Thinks it might be related to drinking hot drinks (scalding gums).   - Still checking in with Dr. Bender. Every 6 months. HRA delayed by Kamron due to school. Planning for December.     Review of Systems:Remaining systems all reviewed and negative.    Past Medical History:  HIV/AIDS  Major Aphthous Ulcers  HPV-associated anal SCC  Vitamin D deficiency     HIV:  -diagnosis: 2016  -previous ART regimens: none  -resistance: none 2/24/23    Health care maintenance:   A. Vaccination/Serology status.   -Hepatitis A: Immune  -Hepatitis B: 5/26/23; 6/27/23; 7/25/23, 11/2023, reactive Surface Ab 1/26/24  -Strep pneumo: PCV23 2023 - needs PCV20 in 8/2024  -TDaP: 2023  -Zoster: due   -Meningococcal: 2024  -Influenza   -COVID: up to date  -Mpox: 8/2022; 9/2022    B. Routine Infectious Disease screening.   -Hepatitis C: negative (2023)   -Toxo IgG: negative (2023)  -CMV IGG: negative (2023)   -TB screen: negative quant gold 5/2023  -STD sceen: GC/CT: negative; RPR negative (2023);   -HLA-B*57-01: negative  -Resistance: pan-S (2/24/23)    C. Cardiovascular, renal and bone health.   -Cholesterol: wnl (2023)  -Blood pressure: 117/79     D. 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 90 tablet 0     oxyCODONE (ROXICODONE) 5 MG tablet Take 1-2 tablets (5-10 mg) by mouth every 4 hours as needed for moderate to severe pain 12 tablet 0     Vitamin D, Cholecalciferol, 10 MCG  (400 UNIT) TABS Take 1 tablet by mouth daily         No Known Allergies    Immunizations:  Immunization History   Administered Date(s) Administered     COVID-19 12+ (Pfizer) 01/04/2024, 10/11/2024     COVID-19 Bivalent 18+ (Moderna) 12/17/2022, 08/25/2023     COVID-19 Monovalent 18+ (Moderna) 04/15/2021, 05/13/2021, 12/01/2021     DTAP (<7y) 05/03/1995     HEPA 08/22/2008, 10/29/2012     HIB (PRP-T) 03/22/1991, 05/17/1991, 09/16/1991     HPV9 02/16/2023, 11/10/2023, 02/16/2024     HepB 09/10/1999, 11/01/1999, 05/01/2000     Hepatitis B, Adult 05/26/2023, 06/27/2023, 07/25/2023, 11/28/2023     Influenza Vaccine >6 months,quad, PF 12/17/2022, 01/04/2024     MENINGOCOCCAL ACWY (MENQUADFI ) 04/05/2024     MMR 09/10/1999     Meningococcal (Menomune ) 08/22/2008     OPV, trivalent, live 05/03/1995     Pneumococcal 23 valent 08/25/2023     Smallpox/Mpox Vaccine Subcutaneous (JYNNEOS) 08/19/2022, 09/28/2022     TD,PF 7+ (Tenivac) 06/17/2002     TDAP (Adacel,Boostrix) 08/25/2023     TDAP Vaccine (Adacel) 10/29/2012       Exam:  /79 (BP Location: Right arm)   Pulse 70   Temp 98.1  F (36.7  C) (Oral)   Ht 1.829 m (6')   Wt 79.4 kg (175 lb)   SpO2 96%   BMI 23.73 kg/m     Wt Readings from Last 2 Encounters:   10/11/24 79.4 kg (175 lb)   04/18/24 77.1 kg (170 lb)     Physical Examination:  GENERAL:  well-appearing. no acute distress.   HEAD:  Head is normocephalic, atraumatic   EYES:  Eyes have anicteric sclerae without conjunctival injection.    NECK:  Supple.  LUNGS:  Normal WOB on RA.   SKIN:  No acute rashes.    NEUROLOGIC:  Grossly nonfocal. Active x4 extremities. Alert. Oriented.          Laboratory Data:   Metabolic Studies       Recent Labs   Lab Test 10/11/24  0904 04/01/24  1715    139   POTASSIUM 4.0 3.8   CHLORIDE 100 99   CO2 31* 26   ANIONGAP 9 14   BUN 10.8 10.5   CR 0.75 0.71   GFRESTIMATED >90 >90   GLC 84 98   DENISHA 10.0 9.7       Hepatic Studies    Recent Labs   Lab Test 10/11/24  0904  04/01/24  1715 01/04/24  1125   BILITOTAL 0.7 0.6 0.8   ALKPHOS 85 103 115   PROTTOTAL 7.8 8.0 8.4*   ALBUMIN 4.8 4.8 4.8   AST 30 29 44   ALT 59 90* 94*       Hematology Studies      Recent Labs   Lab Test 10/11/24  0904 04/01/24  1715 01/04/24  1125 11/28/23  1548 08/25/23  0950 05/26/23  1216   WBC 3.3* 5.3 3.8* 4.6 3.7* 4.0   HGB 15.6 15.5 16.2 15.8 16.3 15.1   HCT 44.2 43.7 45.6 45.4 45.5 43.6    294 253 235 223 231     Imaging:  No new.       Again, thank you for allowing me to participate in the care of your patient.      Sincerely,    Madie Turner MD

## 2024-10-12 LAB
HIV1 RNA # PLAS NAA DL=20: 21 COPIES/ML
HIV1 RNA SERPL NAA+PROBE-LOG#: 1.3 {LOG_COPIES}/ML

## 2024-10-29 NOTE — NURSING NOTE
Prior to immunization administration, verified patients identity using patient s name and date of birth. Please see Immunization Activity for additional information.     Screening Questionnaire for Adult Immunization    Are you sick today?   No   Do you have allergies to medications, food, a vaccine component or latex?   No   Have you ever had a serious reaction after receiving a vaccination?   No   Do you have a long-term health problem with heart, lung, kidney, or metabolic disease (e.g., diabetes), asthma, a blood disorder, no spleen, complement component deficiency, a cochlear implant, or a spinal fluid leak?  Are you on long-term aspirin therapy?   No   Do you have cancer, leukemia, HIV/AIDS, or any other immune system problem?   No   Do you have a parent, brother, or sister with an immune system problem?   No   In the past 3 months, have you taken medications that affect  your immune system, such as prednisone, other steroids, or anticancer drugs; drugs for the treatment of rheumatoid arthritis, Crohn s disease, or psoriasis; or have you had radiation treatments?   No   Have you had a seizure, or a brain or other nervous system problem?   No   During the past year, have you received a transfusion of blood or blood    products, or been given immune (gamma) globulin or antiviral drug?   No   For women: Are you pregnant or is there a chance you could become       pregnant during the next month?   No   Have you received any vaccinations in the past 4 weeks?   No     Immunization questionnaire answers were all negative.        Per orders of Dr. Geller, injection of HPV 9 given by Amy Stokes MA. Patient instructed to remain in clinic for 15 minutes afterwards, and to report any adverse reaction to me immediately.       Screening performed by Amy Stokes MA on 2/16/2023 at 11:03 AM.   Steroid x 5 days  Discussed taking medication as prescribed in AM with food  Avoid NSAIDs while taking prescription steroid  May use steroid cream for itch  Take allergy medication as prescribed for 30 days and follow up with PCP  If symptoms worsen, SOB, difficulty swallowing seek medical treatment  Patient verbalized understanding and agrees with plan of care

## 2024-11-06 NOTE — TELEPHONE ENCOUNTER
FUTURE VISIT INFORMATION        SURGERY INFORMATION:  Date: 2/5/25  [facility]  Surgeon:  Sal Bender MD   Anesthesia Type:  mac  Procedure: EXAM UNDER ANESTHESIA, RECTUM, ANOSCOPY, HIGH RESOLUTION, WITH dysplasia FULGURATION, BIOPSIES      RECORDS REQUESTED FROM:         Primary Care Provider: Tess Geller MD - [facility]

## 2024-11-23 ENCOUNTER — HEALTH MAINTENANCE LETTER (OUTPATIENT)
Age: 34
End: 2024-11-23

## 2024-12-02 DIAGNOSIS — B20 AIDS (ACQUIRED IMMUNODEFICIENCY SYNDROME), CD4 <=200 (H): ICD-10-CM

## 2024-12-03 NOTE — TELEPHONE ENCOUNTER
"Mediation refill protocol failed  Biktarvy    Reason: Overdue Labs   HIV copies 21 per 10/11/24  Provider does not typically order lipid panel per lab history    Action: Routed to Provider for further review     speciality provider within that speciality  Dr. Turner  Plan of care from last visit:   \"Continue Biktarvy. \"  "

## 2024-12-17 ENCOUNTER — PRE VISIT (OUTPATIENT)
Dept: SURGERY | Facility: CLINIC | Age: 34
End: 2024-12-17

## 2025-01-20 ENCOUNTER — TELEPHONE (OUTPATIENT)
Dept: SURGERY | Facility: CLINIC | Age: 35
End: 2025-01-20
Payer: COMMERCIAL

## 2025-01-20 NOTE — TELEPHONE ENCOUNTER
Received voicemail message from patient stating that he would like to reschedule his outpatient surgery with Dr. Sal Bender from February 5, 2025 to sometime May 2025 onward.    Kizzy arambula on 1/20/2025 at 8:30 AM

## 2025-01-20 NOTE — TELEPHONE ENCOUNTER
This writer contacted patient to reschedule surgery with Dr. Sal Bender, as per patient's request    Communicated with: patient via [employer]     New Date of Surgery: Thurs 06/19/2025    Estimated Arrival time Discussed with Patient:  No    Location of surgery: [facility]     Pre-Op H&P: PAC Pre-op Video Visit: Weds 06/04/2025 at 8:30 AM    WOC: Not Applicable     Labs: No     Imaging: No     Post-Op Appt Date w/ NP/PA:  Not Applicable    Post-Op Appt Date w/ Surgeon:  Not Applicable    Bowel Prep:  No      Discussed with patient PAC RN will provide arrival time and instructions for surgery at the time of the appointment: [[place] locations only]: Yes      Standard Surgery Packet Sent: Yes 01/20/25  via [employer] Message      Additional Comments:       All patients questions were answered and was instructed to review surgical packet and call back with any questions or concerns.       Kizzy arambula on 1/20/2025 at 2:08 PM

## 2025-01-20 NOTE — TELEPHONE ENCOUNTER
Left voicemail for patient regarding rescheduling surgery with Dr. Sal Bender.    Provided contact number to discuss.    P: [phone]    Also sent [employer] message to patient requesting that he reply or call [phone] in order to reschedule his outpatient surgery with Dr. aSl Bender, as per his request.    __    Kizzy arambula on 1/20/2025 at 9:09 AM

## 2025-01-22 ENCOUNTER — PRE VISIT (OUTPATIENT)
Dept: SURGERY | Facility: CLINIC | Age: 35
End: 2025-01-22

## 2025-02-25 ENCOUNTER — TELEPHONE (OUTPATIENT)
Dept: INFECTIOUS DISEASES | Facility: CLINIC | Age: 35
End: 2025-02-25
Payer: COMMERCIAL

## 2025-02-26 DIAGNOSIS — B20 AIDS (ACQUIRED IMMUNODEFICIENCY SYNDROME), CD4 <=200 (H): ICD-10-CM

## 2025-02-26 NOTE — TELEPHONE ENCOUNTER
REFILL REQUEST       Medication Sig Dispense Refill    bictegravir-emtricitabine-tenofovir (BIKTARVY) -25 MG per tablet Take 1 tablet by mouth daily. 90 tablet 0       Date of last Rx: 12/3/24    Last appointment: 10/11/24    Last labs: 10/11/24 (VL = 21)    Next appointment: 4/25/25 scheduled                      Labs 4/4/25 scheduled  Are labs overdue?    Plan from last visit:  HIV/AIDS, labs pendig               - Continue Biktarvy.   Follow up: 6 months, can make sooner nursing appointment for vaccines (influenza, PCV-20, and shingrix).   Refill approved?  Yes    Amount: 90    Refills: 0

## 2025-04-08 ENCOUNTER — LAB (OUTPATIENT)
Dept: LAB | Facility: CLINIC | Age: 35
End: 2025-04-08
Payer: COMMERCIAL

## 2025-04-08 DIAGNOSIS — Z21 HIV-1 (HUMAN IMMUNODEFICIENCY VIRUS I) (H): ICD-10-CM

## 2025-04-08 LAB
ALBUMIN SERPL BCG-MCNC: 4.7 G/DL (ref 3.5–5.2)
ALP SERPL-CCNC: 93 U/L (ref 40–150)
ALT SERPL W P-5'-P-CCNC: 55 U/L (ref 0–70)
ANION GAP SERPL CALCULATED.3IONS-SCNC: 12 MMOL/L (ref 7–15)
AST SERPL W P-5'-P-CCNC: 28 U/L (ref 0–45)
BASOPHILS # BLD AUTO: 0 10E3/UL (ref 0–0.2)
BASOPHILS NFR BLD AUTO: 1 %
BILIRUB SERPL-MCNC: 0.5 MG/DL
BUN SERPL-MCNC: 15.4 MG/DL (ref 6–20)
CALCIUM SERPL-MCNC: 9.5 MG/DL (ref 8.8–10.4)
CHLORIDE SERPL-SCNC: 101 MMOL/L (ref 98–107)
CREAT SERPL-MCNC: 0.81 MG/DL (ref 0.67–1.17)
EGFRCR SERPLBLD CKD-EPI 2021: >90 ML/MIN/1.73M2
EOSINOPHIL # BLD AUTO: 0.1 10E3/UL (ref 0–0.7)
EOSINOPHIL NFR BLD AUTO: 1 %
ERYTHROCYTE [DISTWIDTH] IN BLOOD BY AUTOMATED COUNT: 11.7 % (ref 10–15)
GLUCOSE SERPL-MCNC: 94 MG/DL (ref 70–99)
HCO3 SERPL-SCNC: 26 MMOL/L (ref 22–29)
HCT VFR BLD AUTO: 43.8 % (ref 40–53)
HGB BLD-MCNC: 15.8 G/DL (ref 13.3–17.7)
IMM GRANULOCYTES # BLD: 0 10E3/UL
IMM GRANULOCYTES NFR BLD: 0 %
LYMPHOCYTES # BLD AUTO: 1.3 10E3/UL (ref 0.8–5.3)
LYMPHOCYTES NFR BLD AUTO: 23 %
MCH RBC QN AUTO: 31.9 PG (ref 26.5–33)
MCHC RBC AUTO-ENTMCNC: 36.1 G/DL (ref 31.5–36.5)
MCV RBC AUTO: 89 FL (ref 78–100)
MONOCYTES # BLD AUTO: 0.4 10E3/UL (ref 0–1.3)
MONOCYTES NFR BLD AUTO: 6 %
NEUTROPHILS # BLD AUTO: 3.9 10E3/UL (ref 1.6–8.3)
NEUTROPHILS NFR BLD AUTO: 69 %
NRBC # BLD AUTO: 0 10E3/UL
NRBC BLD AUTO-RTO: 0 /100
PLATELET # BLD AUTO: 268 10E3/UL (ref 150–450)
POTASSIUM SERPL-SCNC: 3.9 MMOL/L (ref 3.4–5.3)
PROT SERPL-MCNC: 7.7 G/DL (ref 6.4–8.3)
RBC # BLD AUTO: 4.95 10E6/UL (ref 4.4–5.9)
SODIUM SERPL-SCNC: 139 MMOL/L (ref 135–145)
WBC # BLD AUTO: 5.7 10E3/UL (ref 4–11)

## 2025-04-08 PROCEDURE — 36415 COLL VENOUS BLD VENIPUNCTURE: CPT | Performed by: PATHOLOGY

## 2025-04-08 PROCEDURE — 86359 T CELLS TOTAL COUNT: CPT | Performed by: INTERNAL MEDICINE

## 2025-04-08 PROCEDURE — 86360 T CELL ABSOLUTE COUNT/RATIO: CPT | Performed by: INTERNAL MEDICINE

## 2025-04-08 PROCEDURE — 99000 SPECIMEN HANDLING OFFICE-LAB: CPT | Performed by: PATHOLOGY

## 2025-04-08 PROCEDURE — 87536 HIV-1 QUANT&REVRSE TRNSCRPJ: CPT | Performed by: INTERNAL MEDICINE

## 2025-04-08 PROCEDURE — 80053 COMPREHEN METABOLIC PANEL: CPT | Performed by: PATHOLOGY

## 2025-04-08 PROCEDURE — 85025 COMPLETE CBC W/AUTO DIFF WBC: CPT | Performed by: PATHOLOGY

## 2025-04-09 LAB
CD3 CELLS # BLD: 862 CELLS/UL (ref 603–2990)
CD3 CELLS NFR BLD: 66 % (ref 49–84)
CD3+CD4+ CELLS # BLD: 376 CELLS/UL (ref 441–2156)
CD3+CD4+ CELLS NFR BLD: 29 % (ref 28–63)
CD3+CD4+ CELLS/CD3+CD8+ CLL BLD: 0.84 % (ref 1.4–2.6)
CD3+CD8+ CELLS # BLD: 450 CELLS/UL (ref 125–1312)
CD3+CD8+ CELLS NFR BLD: 34 % (ref 10–40)
HIV1 RNA # PLAS NAA DL=20: 177 COPIES/ML
HIV1 RNA SERPL NAA+PROBE-LOG#: 2.2 {LOG_COPIES}/ML
T CELL COMMENT: ABNORMAL

## 2025-05-22 DIAGNOSIS — B20 AIDS (ACQUIRED IMMUNODEFICIENCY SYNDROME), CD4 <=200 (H): ICD-10-CM

## 2025-05-22 NOTE — TELEPHONE ENCOUNTER
Pt requesting Biktarvy medication refill. Routed to [facility] b/c of lab value. Seeking clarification.     HIV panel: 4/8/25 value: 177.     Pt most recent OV: Dr Turner 4/25/25  Note:   . HIV/AIDS, VL suppressed, CD4 376/29%               - Continue Biktarvy.   2. 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 Bender   3. Major aphthous ulcer (recurrent) - resolved               - 4/2023 Chronic inflammation on path, cultures negative, organism staining negative. Improved with topical clobetasol.   4. Mild transaminitis, resolved  5. Penile warts - resolved  6. Vitamin D deficiency - improved       Pt has f/up ID visit with Johnny on 10/24/25    Trisha Carlos RN

## 2025-05-28 ENCOUNTER — PHARMACY VISIT (OUTPATIENT)
Dept: PHARMACY | Facility: CLINIC | Age: 35
End: 2025-05-28
Payer: COMMERCIAL

## 2025-05-28 NOTE — PROGRESS NOTES
HIV Clinical Follow Up Assessment    Activity Medications    BIKTARVY -25MG TABS    Summary Notes  Spoke with Kamron via [facility] link for assessment. Current Regimen: Biktarvy once daily     Adherence: PDC = 1.0, no concerns.     Tolerability: Denies SE     HIV: Pt says things are still going well. 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 Pt had appt and labs last month. VL increased from 21 copies up to 177 copies. Plan is to recheck in 6 months. Hoping it is just a blip. CD4 continues to increase, 376 cells. CBC and CMP are great, all WNL.     Follow up: Quarterly    Chloe Grace PharmD  Therapy Management Pharmacist  [facility]

## 2025-06-01 ENCOUNTER — HEALTH MAINTENANCE LETTER (OUTPATIENT)
Age: 35
End: 2025-06-01

## 2025-06-04 ENCOUNTER — TELEPHONE (OUTPATIENT)
Dept: SURGERY | Facility: CLINIC | Age: 35
End: 2025-06-04

## 2025-06-04 NOTE — TELEPHONE ENCOUNTER
Patient called to reschedule surgery with Dr. Sal Bender     Spoke with: Kamron (patient)    New Date of Surgery: 12/18/2025    Estimated Arrival time Discussed with Patient:  No    Location of surgery: [facility]     Pre-Op H&P: PAC Video Visit on 12/04/2025 at 9:15 AM    WOC: Not Applicable     Labs: No (unless ordered by PAC)    Imaging: No (unless ordered by PAC)    Post-Op Appt Date w/ NP/PA:  Not Indicated    Post-Op Appt Date w/ Surgeon:  Not Indicated     Bowel Prep:  No  - Not Applicable    Discussed with patient PAC RN will provide arrival time and instructions for surgery at the time of the appointment: [[place] locations only]: Yes      Standard Surgery Packet Sent: Yes 06/04/25  via [employer] Message      Additional Comments:   - patient called to reschedule his HRA surgery with Dr. Sal Bender from DOS 6/19 to DOS 12/18/2025 because he says that he is unable to make any DOS work between those dates.      All patients questions were answered and was instructed to review surgical packet and call back with any questions or concerns.       Kizzy arambula on 6/4/2025 at 9:14 AM

## 2025-07-13 ENCOUNTER — HEALTH MAINTENANCE LETTER (OUTPATIENT)
Age: 35
End: 2025-07-13

## 2025-08-18 DIAGNOSIS — B20 AIDS (ACQUIRED IMMUNODEFICIENCY SYNDROME), CD4 <=200 (H): ICD-10-CM

## 2025-08-21 ENCOUNTER — PHARMACY VISIT (OUTPATIENT)
Dept: PHARMACY | Facility: CLINIC | Age: 35
End: 2025-08-21
Payer: COMMERCIAL

## (undated) DEVICE — CUP AND LID 2PK 2OZ STERILE  SSK9006A

## (undated) DEVICE — SOL WATER IRRIG 500ML BOTTLE 2F7113

## (undated) DEVICE — TAPE DURAPORE 3" SILK 1538-3

## (undated) DEVICE — PAD CHUX UNDERPAD 30X30"

## (undated) DEVICE — ESU HOLSTER PLASTIC DISP E2400

## (undated) DEVICE — STRAP KNEE/BODY 31143004

## (undated) DEVICE — LINEN TOWEL PACK X5 5464

## (undated) DEVICE — TAPE MEDIPORE 4"X2YD 2864

## (undated) DEVICE — ESU GROUND PAD ADULT W/CORD E7507

## (undated) DEVICE — APPLICATORS COTTON TIP 6"X2 STERILE LF C15053-006

## (undated) DEVICE — GLOVE PROTEXIS MICRO 7.0  2D73PM70

## (undated) DEVICE — SYR 20ML LL W/O NDL 302830

## (undated) DEVICE — ANOSCOPE EXAMINATION L 112MMÂ 800191

## (undated) DEVICE — ESU ELEC LEEP BALL 3MM

## (undated) DEVICE — PREP SKIN SCRUB TRAY 4461A

## (undated) DEVICE — GLOVE BIOGEL PI MICRO INDICATOR UNDERGLOVE SZ 7.5 48975

## (undated) DEVICE — APPLICATOR FIBER TIP 6" PLASTIC 258061PDAL A5005-1

## (undated) DEVICE — LINEN TOWEL PACK X6 WHITE 5487

## (undated) DEVICE — PACK RECTAL KIT CUSTOM ASC

## (undated) DEVICE — DRSG GAUZE 4X4" TRAY 6939

## (undated) DEVICE — SUCTION MANIFOLD NEPTUNE 2 SYS 1 PORT 702-025-000

## (undated) DEVICE — SWAB PROCTO 16" 2/PK 32-046

## (undated) DEVICE — KIT SUREPATH 10ML  COLLECTION VIAL 1305190

## (undated) DEVICE — APPLICATOR COTTON TIP 6"X2 STERILE LF 6012

## (undated) DEVICE — DRAPE LEGGINGS CLEAR 8430

## (undated) DEVICE — HANDPIECE SMOKE EVAC FUSION THDÂ Â 14-2001

## (undated) DEVICE — TUBING SUCTION 10'X3/16" N510

## (undated) DEVICE — DRSG TELFA 3X8" 1238

## (undated) DEVICE — GLOVE BIOGEL PI MICRO INDICATOR UNDERGLOVE SZ 7.0 48970

## (undated) DEVICE — SOL BENZOIN 0.5OZ

## (undated) DEVICE — ELECTRODE SURGICAL BLADE 6"Â 14-1066P

## (undated) DEVICE — RX BACITRACIN OINTMENT 0.9G 1/32OZ CUR001109

## (undated) DEVICE — RX BACITRACIN OINTMENT 14G 0.5OZ 45802-060-01

## (undated) DEVICE — LASER MASK RESPIRATOR N95

## (undated) RX ORDER — ACETAMINOPHEN 325 MG/1
TABLET ORAL
Status: DISPENSED
Start: 2023-10-25

## (undated) RX ORDER — ONDANSETRON 2 MG/ML
INJECTION INTRAMUSCULAR; INTRAVENOUS
Status: DISPENSED
Start: 2024-04-18

## (undated) RX ORDER — METRONIDAZOLE 500 MG/100ML
INJECTION, SOLUTION INTRAVENOUS
Status: DISPENSED
Start: 2023-10-25

## (undated) RX ORDER — KETOROLAC TROMETHAMINE 30 MG/ML
INJECTION, SOLUTION INTRAMUSCULAR; INTRAVENOUS
Status: DISPENSED
Start: 2024-01-18

## (undated) RX ORDER — PROPOFOL 10 MG/ML
INJECTION, EMULSION INTRAVENOUS
Status: DISPENSED
Start: 2023-10-25

## (undated) RX ORDER — GLYCOPYRROLATE 0.2 MG/ML
INJECTION INTRAMUSCULAR; INTRAVENOUS
Status: DISPENSED
Start: 2023-10-25

## (undated) RX ORDER — ONDANSETRON 2 MG/ML
INJECTION INTRAMUSCULAR; INTRAVENOUS
Status: DISPENSED
Start: 2024-01-18

## (undated) RX ORDER — CEFAZOLIN SODIUM 2 G/50ML
SOLUTION INTRAVENOUS
Status: DISPENSED
Start: 2023-10-25

## (undated) RX ORDER — FENTANYL CITRATE 50 UG/ML
INJECTION, SOLUTION INTRAMUSCULAR; INTRAVENOUS
Status: DISPENSED
Start: 2024-01-18

## (undated) RX ORDER — ONDANSETRON 2 MG/ML
INJECTION INTRAMUSCULAR; INTRAVENOUS
Status: DISPENSED
Start: 2023-10-25

## (undated) RX ORDER — PROPOFOL 10 MG/ML
INJECTION, EMULSION INTRAVENOUS
Status: DISPENSED
Start: 2024-01-18

## (undated) RX ORDER — FENTANYL CITRATE 50 UG/ML
INJECTION, SOLUTION INTRAMUSCULAR; INTRAVENOUS
Status: DISPENSED
Start: 2023-10-25

## (undated) RX ORDER — BUPIVACAINE HYDROCHLORIDE 2.5 MG/ML
INJECTION, SOLUTION EPIDURAL; INFILTRATION; INTRACAUDAL
Status: DISPENSED
Start: 2023-10-25

## (undated) RX ORDER — DEXAMETHASONE SODIUM PHOSPHATE 4 MG/ML
INJECTION, SOLUTION INTRA-ARTICULAR; INTRALESIONAL; INTRAMUSCULAR; INTRAVENOUS; SOFT TISSUE
Status: DISPENSED
Start: 2024-01-18

## (undated) RX ORDER — SILVER SULFADIAZINE 10 MG/G
CREAM TOPICAL
Status: DISPENSED
Start: 2023-10-25

## (undated) RX ORDER — ACETAMINOPHEN 325 MG/1
TABLET ORAL
Status: DISPENSED
Start: 2024-04-18

## (undated) RX ORDER — BUPIVACAINE HYDROCHLORIDE 5 MG/ML
INJECTION, SOLUTION EPIDURAL; INTRACAUDAL
Status: DISPENSED
Start: 2023-10-25

## (undated) RX ORDER — HYDROMORPHONE HYDROCHLORIDE 1 MG/ML
INJECTION, SOLUTION INTRAMUSCULAR; INTRAVENOUS; SUBCUTANEOUS
Status: DISPENSED
Start: 2024-04-18